# Patient Record
Sex: MALE | Race: WHITE | Employment: FULL TIME | URBAN - METROPOLITAN AREA
[De-identification: names, ages, dates, MRNs, and addresses within clinical notes are randomized per-mention and may not be internally consistent; named-entity substitution may affect disease eponyms.]

---

## 2018-06-11 ENCOUNTER — OFFICE VISIT (OUTPATIENT)
Dept: INTERNAL MEDICINE CLINIC | Age: 38
End: 2018-06-11

## 2018-06-11 VITALS
SYSTOLIC BLOOD PRESSURE: 124 MMHG | BODY MASS INDEX: 25.95 KG/M2 | WEIGHT: 152 LBS | DIASTOLIC BLOOD PRESSURE: 88 MMHG | HEART RATE: 72 BPM | HEIGHT: 64 IN

## 2018-06-11 DIAGNOSIS — M54.2 NECK PAIN: ICD-10-CM

## 2018-06-11 DIAGNOSIS — I65.02 STENOSIS OF LEFT VERTEBRAL ARTERY: Primary | ICD-10-CM

## 2018-06-11 DIAGNOSIS — I63.59 CEREBROVASCULAR ACCIDENT (CVA) DUE TO STENOSIS OF OTHER CEREBRAL ARTERY (HCC): ICD-10-CM

## 2018-06-11 PROCEDURE — 99203 OFFICE O/P NEW LOW 30 MIN: CPT | Performed by: INTERNAL MEDICINE

## 2018-06-11 ASSESSMENT — PATIENT HEALTH QUESTIONNAIRE - PHQ9
2. FEELING DOWN, DEPRESSED OR HOPELESS: 0
SUM OF ALL RESPONSES TO PHQ9 QUESTIONS 1 & 2: 0
SUM OF ALL RESPONSES TO PHQ QUESTIONS 1-9: 0
1. LITTLE INTEREST OR PLEASURE IN DOING THINGS: 0

## 2018-06-11 ASSESSMENT — ENCOUNTER SYMPTOMS
SHORTNESS OF BREATH: 0
ABDOMINAL PAIN: 0
NAUSEA: 0
PHOTOPHOBIA: 0
VOMITING: 0
WHEEZING: 0

## 2018-06-15 ENCOUNTER — OFFICE VISIT (OUTPATIENT)
Dept: VASCULAR SURGERY | Age: 38
End: 2018-06-15

## 2018-06-15 VITALS
SYSTOLIC BLOOD PRESSURE: 126 MMHG | DIASTOLIC BLOOD PRESSURE: 72 MMHG | BODY MASS INDEX: 25.95 KG/M2 | HEIGHT: 64 IN | WEIGHT: 152 LBS

## 2018-06-15 DIAGNOSIS — I65.02 STENOSIS OF LEFT VERTEBRAL ARTERY: Primary | ICD-10-CM

## 2018-06-15 PROCEDURE — 99203 OFFICE O/P NEW LOW 30 MIN: CPT | Performed by: SURGERY

## 2018-07-27 ENCOUNTER — HOSPITAL ENCOUNTER (OUTPATIENT)
Dept: OTHER | Age: 38
Discharge: HOME | End: 2018-07-27
Attending: PSYCHIATRY & NEUROLOGY | Admitting: PSYCHIATRY & NEUROLOGY

## 2018-07-27 ENCOUNTER — OFFICE VISIT (OUTPATIENT)
Dept: NEUROLOGY | Age: 38
End: 2018-07-27

## 2018-07-27 VITALS
WEIGHT: 155 LBS | SYSTOLIC BLOOD PRESSURE: 129 MMHG | HEART RATE: 75 BPM | HEIGHT: 65 IN | DIASTOLIC BLOOD PRESSURE: 89 MMHG | BODY MASS INDEX: 25.83 KG/M2

## 2018-07-27 DIAGNOSIS — I63.40 CEREBRAL INFARCTION DUE TO EMBOLISM OF CEREBRAL ARTERY (HCC): Primary | ICD-10-CM

## 2018-07-27 DIAGNOSIS — F17.200 SMOKER: ICD-10-CM

## 2018-07-27 DIAGNOSIS — I65.02 STENOSIS OF LEFT VERTEBRAL ARTERY: ICD-10-CM

## 2018-07-27 DIAGNOSIS — R90.89 ABNORMAL FINDING ON MRI OF BRAIN: ICD-10-CM

## 2018-07-27 DIAGNOSIS — I63.40 CEREBRAL INFARCTION DUE TO EMBOLISM OF CEREBRAL ARTERY (HCC): ICD-10-CM

## 2018-07-27 LAB — HOMOCYSTEINE: 9 UMOL/L (ref 0–10)

## 2018-07-27 PROCEDURE — 99245 OFF/OP CONSLTJ NEW/EST HI 55: CPT | Performed by: PSYCHIATRY & NEUROLOGY

## 2018-07-27 NOTE — PATIENT INSTRUCTIONS
Please call with any questions or concerns:   [facility]  @ [phone]. LAB RESULTS:  Please obtain any labs or diagnostic tests as discussed today. You should call the office to check the results. Please allow  3 to 7 days for us to get these results. MEDICATION LIST:  Please bring an accurate list of your medications to every visit. APPOINTMENT CONFIRMATION:  We will call you the day before your scheduled appointment to confirm. If we are unable to reach you, you MUST call back by the end of the day to confirm the appointment or we may be forced to cancel.

## 2018-07-27 NOTE — LETTER
He will also need a detailed hypercoagulopathy workup  Dissection/stenosis of the left vertebral artery but this would not explain all the strokes that the patient has had      RECOMMENDATIONS :  Discussed in detail with patient and his family  Strongly recommended that he quit smoking  Continue aspirin for the time being  I would recommend cardiology consultation, transesophageal echocardiogram and even consideration of a loop recorder to look for paroxysmal atrial fibrillation  I will do a detailed hypercoagulopathy workup  I will see him back in 2 months for follow-up  Thank you for this consultation        Please note a portion of this chart was generated using dragon dictation software. Although every effort was made to ensure the accuracy of this automated transcription, some errors in transcription may have occurred. If you have questions, please do not hesitate to call me. I look forward to following Michelle Conteh along with you. Sincerely,        Cece Davenport MD    CC providers:  Moraima Perez MD  [address] [facility]  VIA In Bessie Finn MD  [address] [place] [address].  [address] [place] [address]

## 2018-07-27 NOTE — PROGRESS NOTES
follow-up  Thank you for this consultation        Please note a portion of this chart was generated using dragon dictation software. Although every effort was made to ensure the accuracy of this automated transcription, some errors in transcription may have occurred.

## 2018-07-29 LAB
ANTICARDIOLIPIN IGG ANTIBODY: 1 GPL (ref 0–14)
CARDIOLIPIN AB IGM: 0 MPL (ref 0–12)
DRVVT CONFIRMATION TEST: NORMAL RATIO
DRVVT SCREEN: 43 SEC (ref 33–44)
DRVVT,DIL: NORMAL SEC (ref 33–44)
HEXAGONAL PHOSPHOLIPID NEUTRALIZAT TEST: NORMAL
LUPUS ANTICOAG INTERP: NORMAL
PLT NEUTA: NORMAL
PROTEIN C FUNCTIONAL: 145 % (ref 83–168)
PROTEIN S, FUNCTIONAL: 106 % (ref 66–143)
PT D: 13.2 SEC (ref 12–15.5)
PTT D: 41 SEC (ref 32–48)
PTT-D CORR REFLEX: NORMAL SEC (ref 32–48)
PTT-HEPARIN NEUTRALIZED: NORMAL SEC (ref 32–48)
REPTILASE TIME: NORMAL SEC
THROMBIN TIME: NORMAL SEC (ref 14.7–19.5)

## 2018-07-31 LAB
FACTOR V LEIDEN: NEGATIVE
SPECIMEN: NORMAL

## 2018-08-01 ENCOUNTER — OFFICE VISIT (OUTPATIENT)
Dept: INTERNAL MEDICINE CLINIC | Age: 38
End: 2018-08-01

## 2018-08-01 VITALS
HEART RATE: 72 BPM | SYSTOLIC BLOOD PRESSURE: 110 MMHG | DIASTOLIC BLOOD PRESSURE: 80 MMHG | HEIGHT: 65 IN | WEIGHT: 154 LBS | BODY MASS INDEX: 25.66 KG/M2

## 2018-08-01 DIAGNOSIS — I65.02 STENOSIS OF LEFT VERTEBRAL ARTERY: ICD-10-CM

## 2018-08-01 DIAGNOSIS — F17.219 CIGARETTE NICOTINE DEPENDENCE WITH NICOTINE-INDUCED DISORDER: ICD-10-CM

## 2018-08-01 DIAGNOSIS — M54.2 NECK PAIN: ICD-10-CM

## 2018-08-01 DIAGNOSIS — M50.90 CERVICAL DISC DISORDER: Primary | ICD-10-CM

## 2018-08-01 DIAGNOSIS — Z13.220 LIPID SCREENING: ICD-10-CM

## 2018-08-01 PROCEDURE — 99214 OFFICE O/P EST MOD 30 MIN: CPT | Performed by: INTERNAL MEDICINE

## 2018-08-01 RX ORDER — TIZANIDINE 2 MG/1
2 TABLET ORAL 3 TIMES DAILY
Qty: 90 TABLET | Refills: 0 | Status: SHIPPED | OUTPATIENT
Start: 2018-08-01 | End: 2018-08-28 | Stop reason: SDUPTHER

## 2018-08-01 ASSESSMENT — ENCOUNTER SYMPTOMS
PHOTOPHOBIA: 0
WHEEZING: 0
ABDOMINAL PAIN: 0
NAUSEA: 0
VOMITING: 0
SHORTNESS OF BREATH: 0

## 2018-08-01 NOTE — PROGRESS NOTES
Marie Torres   1980  male  40 y.o. SUBJECTIVE:    Chief Complaint   Patient presents with    Follow-up    Headache     more frequently--neck pain leads to headache.zanaflex hasn't help       HPI:  Follow-up visit. Patient continued to complain of pain at the left side of the neck and radiates to the head. He felt similar kind of pain following injury in 2016. In the past Zanaflex helps somewhat. In the past Percocet also helped. Patient have seen urologist, vascular surgeon. He Is going to see a cardiologist to exclude any  Potential cardiac thrombi in future. He'll continue to smoke cigarettes. He has significantly reduced the number of smoking. He will let me know once he is ready to quit his smoking    Past Medical History:   Diagnosis Date    Bell's palsy     History of migraine headaches     Ischemic stroke ([facility] [address]      No past surgical history on file. Social History     Social History    Marital status: Legally      Spouse name: N/A    Number of children: N/A    Years of education: N/A     Occupational History    [employer]     Social History Main Topics    Smoking status: Current Every Day Smoker     Packs/day: 0.50     Types: Cigarettes     Start date: 1/1/1997    Smokeless tobacco: Never Used      Comment: half a pack a day    Alcohol use No    Drug use: No    Sexual activity: Not Asked     Other Topics Concern    None     Social History Narrative    None     No family history on file. Review of Systems   Eyes: Negative for photophobia and visual disturbance. Respiratory: Negative for shortness of breath and wheezing. Cardiovascular: Negative for chest pain and palpitations. Gastrointestinal: Negative for abdominal pain, nausea and vomiting. Endocrine: Negative for polyphagia and polyuria. Musculoskeletal: Positive for neck pain. Neurological: Negative for dizziness, weakness and light-headedness.        OBJECTIVE:  Pulse Future  -     CBC; Future  -     BASIC METABOLIC PANEL; Future    Neck pain  -     tiZANidine (ZANAFLEX) 2 MG tablet; Take 1 tablet by mouth 3 times daily    Stenosis of left vertebral artery  Already seen vascular surgeon  Lipid screening-history ordered  -     LIPID PANEL; Future  -     HEPATIC FUNCTION PANEL; Future    Cigarette nicotine dependence with nicotine-induced disorder  Patient is not ready to quit his smoking at this time. An After Visit Summary was printed and given to the patient. Documentation was done using voice recognition dragon software. Every effort was made to ensure accuracy; however, inadvertent  Unintentional computerized transcription errors may be present. Orders Placed This Encounter   Procedures    MRI CERVICAL SPINE WO CONTRAST     Standing Status:   Future     Standing Expiration Date:   8/1/2019    LIPID PANEL     Standing Status:   Future     Standing Expiration Date:   8/1/2019     Order Specific Question:   Is Patient Fasting?/# of Hours     Answer:   non fasting    CBC     Standing Status:   Future     Standing Expiration Date:   8/1/2019    BASIC METABOLIC PANEL     Standing Status:   Future     Standing Expiration Date:   8/1/2019    HEPATIC FUNCTION PANEL     Standing Status:   Future     Standing Expiration Date:   8/1/2019     Current Outpatient Prescriptions   Medication Sig Dispense Refill    tiZANidine (ZANAFLEX) 2 MG tablet Take 1 tablet by mouth 3 times daily 90 tablet 0    aspirin 81 MG tablet Take 81 mg by mouth every other day Qod, due to nasal bleeding out of lt nostril only       No current facility-administered medications for this visit. Return in about 2 months (around 10/1/2018).

## 2018-08-07 ENCOUNTER — OFFICE VISIT (OUTPATIENT)
Dept: CARDIOLOGY CLINIC | Age: 38
End: 2018-08-07

## 2018-08-07 ENCOUNTER — HOSPITAL ENCOUNTER (OUTPATIENT)
Dept: MRI IMAGING | Age: 38
Discharge: HOME | End: 2018-08-07
Attending: INTERNAL MEDICINE | Admitting: INTERNAL MEDICINE

## 2018-08-07 ENCOUNTER — NURSE ONLY (OUTPATIENT)
Dept: CARDIOLOGY CLINIC | Age: 38
End: 2018-08-07

## 2018-08-07 VITALS
SYSTOLIC BLOOD PRESSURE: 112 MMHG | WEIGHT: 156.7 LBS | BODY MASS INDEX: 26.11 KG/M2 | HEART RATE: 94 BPM | HEIGHT: 65 IN | DIASTOLIC BLOOD PRESSURE: 76 MMHG | OXYGEN SATURATION: 97 %

## 2018-08-07 DIAGNOSIS — M50.90 CERVICAL DISC DISORDER: ICD-10-CM

## 2018-08-07 DIAGNOSIS — F17.200 SMOKER: ICD-10-CM

## 2018-08-07 DIAGNOSIS — I63.40 CEREBRAL INFARCTION DUE TO EMBOLISM OF CEREBRAL ARTERY (HCC): ICD-10-CM

## 2018-08-07 DIAGNOSIS — M48.00 NEURAL FORAMINAL STENOSIS, MULTILEVEL: Primary | ICD-10-CM

## 2018-08-07 DIAGNOSIS — I63.40 CEREBRAL INFARCTION DUE TO EMBOLISM OF CEREBRAL ARTERY (HCC): Primary | ICD-10-CM

## 2018-08-07 PROCEDURE — 93000 ELECTROCARDIOGRAM COMPLETE: CPT | Performed by: INTERNAL MEDICINE

## 2018-08-07 PROCEDURE — 99244 OFF/OP CNSLTJ NEW/EST MOD 40: CPT | Performed by: INTERNAL MEDICINE

## 2018-08-07 NOTE — COMMUNICATION BODY
[address]  Cardiac Consult     Referring Provider:  Jeffrey Ventura MD     Chief Complaint   Patient presents with    Other     cerebral infarct    Established New Doctor    Chest Pain     mild intermittent - a few days ago        History of Present Illness:  39 y/o male seen in consultation at the request of Dr. Hetaher Rankin and Noy Pinto for evaluation of cardiac source of emboli. 18 months ago while in [place], he began having difficulty with vision. No exacerbating or relieving factors. He has had associated neck pain and headaches. These symptoms have persisted. He underwent evaluation with MRI showing multiple scattered CVA's c/w embolic events. There was a question of vertebral dissection. He felt that he was not getting answers so came back to [place], where he is from. He was seen by Dr. Rolando Muhammad who did not feel that his CVA distribution was c/w a dissection. He was then seen by Dr. Noy Pinto who was concerned that this could be embolic. He did have an ECHO in [place] that was read as normal.    Past Medical History:   has a past medical history of Bell's palsy; History of migraine headaches; and Ischemic stroke ([facility] [address] Surgical History:   has no past surgical history on file. Social History:  Social History   Substance Use Topics    Smoking status: Current Every Day Smoker     Packs/day: 0.50     Types: Cigarettes     Start date: 1/1/1997    Smokeless tobacco: Never Used      Comment: half a pack a day    Alcohol use No        Family History:  family history includes Heart Disease in his father and mother; Stroke in his father and mother. Allergies:  Patient has no known allergies. Home Medications:  Prior to Visit Medications    Medication Sig Taking?  Authorizing Provider   tiZANidine (ZANAFLEX) 2 MG tablet Take 1 tablet by mouth 3 times daily  Patient taking differently: Take 2 mg by mouth 3 times daily as needed  Yes Jeffrey Ventura MD   aspirin 81 MG tablet Take 81 mg by mouth

## 2018-08-07 NOTE — PROGRESS NOTES
[address]  Cardiac Consult     Referring Provider:  Jovanni Amor MD     Chief Complaint   Patient presents with    Other     cerebral infarct    Established New Doctor    Chest Pain     mild intermittent - a few days ago        History of Present Illness:  39 y/o male seen in consultation at the request of Dr. Tobias Echeverria and Marvin Luis for evaluation of cardiac source of emboli. 18 months ago while in [place], he began having difficulty with vision. No exacerbating or relieving factors. He has had associated neck pain and headaches. These symptoms have persisted. He underwent evaluation with MRI showing multiple scattered CVA's c/w embolic events. There was a question of vertebral dissection. He felt that he was not getting answers so came back to [place], where he is from. He was seen by Dr. Davon Mcghee who did not feel that his CVA distribution was c/w a dissection. He was then seen by Dr. Marvin Luis who was concerned that this could be embolic. He did have an ECHO in [place] that was read as normal.    Past Medical History:   has a past medical history of Bell's palsy; History of migraine headaches; and Ischemic stroke ([facility] [address] Surgical History:   has no past surgical history on file. Social History:  Social History   Substance Use Topics    Smoking status: Current Every Day Smoker     Packs/day: 0.50     Types: Cigarettes     Start date: 1/1/1997    Smokeless tobacco: Never Used      Comment: half a pack a day    Alcohol use No        Family History:  family history includes Heart Disease in his father and mother; Stroke in his father and mother. Allergies:  Patient has no known allergies. Home Medications:  Prior to Visit Medications    Medication Sig Taking?  Authorizing Provider   tiZANidine (ZANAFLEX) 2 MG tablet Take 1 tablet by mouth 3 times daily  Patient taking differently: Take 2 mg by mouth 3 times daily as needed  Yes Jovanni Amor MD   aspirin 81 MG tablet Take 81 mg by mouth

## 2018-08-28 DIAGNOSIS — M54.2 NECK PAIN: ICD-10-CM

## 2018-08-28 RX ORDER — TIZANIDINE 2 MG/1
TABLET ORAL
Qty: 90 TABLET | Refills: 1 | Status: SHIPPED | OUTPATIENT
Start: 2018-08-28 | End: 2019-02-27 | Stop reason: ALTCHOICE

## 2018-09-18 PROCEDURE — 93228 REMOTE 30 DAY ECG REV/REPORT: CPT | Performed by: INTERNAL MEDICINE

## 2018-09-21 ENCOUNTER — TELEPHONE (OUTPATIENT)
Dept: CARDIOLOGY CLINIC | Age: 38
End: 2018-09-21

## 2018-09-30 ENCOUNTER — APPOINTMENT (OUTPATIENT)
Dept: GENERAL RADIOLOGY | Age: 38
End: 2018-09-30

## 2018-09-30 ENCOUNTER — APPOINTMENT (OUTPATIENT)
Dept: CT IMAGING | Age: 38
End: 2018-09-30

## 2018-09-30 ENCOUNTER — HOSPITAL ENCOUNTER (EMERGENCY)
Age: 38
Discharge: HOME | End: 2018-09-30
Attending: EMERGENCY MEDICINE

## 2018-09-30 VITALS
SYSTOLIC BLOOD PRESSURE: 118 MMHG | RESPIRATION RATE: 16 BRPM | WEIGHT: 152.25 LBS | TEMPERATURE: 98.2 F | DIASTOLIC BLOOD PRESSURE: 80 MMHG | OXYGEN SATURATION: 99 % | HEART RATE: 64 BPM | BODY MASS INDEX: 25.34 KG/M2

## 2018-09-30 DIAGNOSIS — M54.2 NECK PAIN: Primary | ICD-10-CM

## 2018-09-30 DIAGNOSIS — I65.02 VERTEBRAL ARTERY STENOSIS, LEFT: ICD-10-CM

## 2018-09-30 LAB
A/G RATIO: 1.5 (ref 1.1–2.2)
ALBUMIN SERPL-MCNC: 4.3 G/DL (ref 3.4–5)
ALP BLD-CCNC: 91 U/L (ref 40–129)
ALT SERPL-CCNC: 19 U/L (ref 10–40)
ANION GAP SERPL CALCULATED.3IONS-SCNC: 12 MMOL/L (ref 3–16)
AST SERPL-CCNC: 17 U/L (ref 15–37)
BASOPHILS ABSOLUTE: 0.1 K/UL (ref 0–0.2)
BASOPHILS RELATIVE PERCENT: 0.7 %
BILIRUB SERPL-MCNC: 0.3 MG/DL (ref 0–1)
BUN BLDV-MCNC: 12 MG/DL (ref 7–20)
CALCIUM SERPL-MCNC: 9.4 MG/DL (ref 8.3–10.6)
CHLORIDE BLD-SCNC: 104 MMOL/L (ref 99–110)
CO2: 25 MMOL/L (ref 21–32)
CREAT SERPL-MCNC: 1.1 MG/DL (ref 0.9–1.3)
EOSINOPHILS ABSOLUTE: 0.1 K/UL (ref 0–0.6)
EOSINOPHILS RELATIVE PERCENT: 0.8 %
GFR AFRICAN AMERICAN: >60
GFR NON-AFRICAN AMERICAN: >60
GLOBULIN: 2.8 G/DL
GLUCOSE BLD-MCNC: 110 MG/DL (ref 70–99)
HCT VFR BLD CALC: 40.5 % (ref 40.5–52.5)
HEMOGLOBIN: 14.3 G/DL (ref 13.5–17.5)
LYMPHOCYTES ABSOLUTE: 2.4 K/UL (ref 1–5.1)
LYMPHOCYTES RELATIVE PERCENT: 28.9 %
MCH RBC QN AUTO: 30.9 PG (ref 26–34)
MCHC RBC AUTO-ENTMCNC: 35.3 G/DL (ref 31–36)
MCV RBC AUTO: 87.6 FL (ref 80–100)
MONOCYTES ABSOLUTE: 0.6 K/UL (ref 0–1.3)
MONOCYTES RELATIVE PERCENT: 6.6 %
NEUTROPHILS ABSOLUTE: 5.3 K/UL (ref 1.7–7.7)
NEUTROPHILS RELATIVE PERCENT: 63 %
PDW BLD-RTO: 13.5 % (ref 12.4–15.4)
PLATELET # BLD: 259 K/UL (ref 135–450)
PMV BLD AUTO: 9.6 FL (ref 5–10.5)
POTASSIUM SERPL-SCNC: 4.2 MMOL/L (ref 3.5–5.1)
RBC # BLD: 4.63 M/UL (ref 4.2–5.9)
SODIUM BLD-SCNC: 141 MMOL/L (ref 136–145)
TOTAL PROTEIN: 7.1 G/DL (ref 6.4–8.2)
TROPONIN: <0.01 NG/ML
WBC # BLD: 8.4 K/UL (ref 4–11)

## 2018-09-30 PROCEDURE — 72050 X-RAY EXAM NECK SPINE 4/5VWS: CPT

## 2018-09-30 PROCEDURE — 85025 COMPLETE CBC W/AUTO DIFF WBC: CPT

## 2018-09-30 PROCEDURE — 70496 CT ANGIOGRAPHY HEAD: CPT

## 2018-09-30 PROCEDURE — 84484 ASSAY OF TROPONIN QUANT: CPT

## 2018-09-30 PROCEDURE — 99284 EMERGENCY DEPT VISIT MOD MDM: CPT

## 2018-09-30 PROCEDURE — 72040 X-RAY EXAM NECK SPINE 2-3 VW: CPT

## 2018-09-30 PROCEDURE — 70450 CT HEAD/BRAIN W/O DYE: CPT

## 2018-09-30 PROCEDURE — 93005 ELECTROCARDIOGRAM TRACING: CPT | Performed by: PHYSICIAN ASSISTANT

## 2018-09-30 PROCEDURE — 80053 COMPREHEN METABOLIC PANEL: CPT

## 2018-09-30 PROCEDURE — 71046 X-RAY EXAM CHEST 2 VIEWS: CPT

## 2018-09-30 PROCEDURE — 70498 CT ANGIOGRAPHY NECK: CPT

## 2018-09-30 PROCEDURE — 6360000004 HC RX CONTRAST MEDICATION: Performed by: PHYSICIAN ASSISTANT

## 2018-09-30 RX ORDER — TRAMADOL HYDROCHLORIDE 50 MG/1
50 TABLET ORAL EVERY 6 HOURS PRN
Qty: 20 TABLET | Refills: 0 | Status: SHIPPED | OUTPATIENT
Start: 2018-09-30 | End: 2018-10-10

## 2018-09-30 RX ORDER — PREDNISONE 10 MG/1
60 TABLET ORAL DAILY
Qty: 30 TABLET | Refills: 0 | Status: SHIPPED | OUTPATIENT
Start: 2018-09-30 | End: 2018-10-05

## 2018-09-30 RX ADMIN — IOPAMIDOL 100 ML: 755 INJECTION, SOLUTION INTRAVENOUS at 15:56

## 2018-09-30 ASSESSMENT — PAIN DESCRIPTION - PAIN TYPE: TYPE: ACUTE PAIN

## 2018-09-30 ASSESSMENT — PAIN DESCRIPTION - LOCATION: LOCATION: NECK

## 2018-09-30 ASSESSMENT — PAIN SCALES - GENERAL: PAINLEVEL_OUTOF10: 5

## 2018-09-30 NOTE — ED PROVIDER NOTES
[address] [facility]  eMERGENCY dEPARTMENT eNCOUnter        Pt Name: Reed Noriega  MRN: [id number]  [place] 1980  Date of evaluation: 9/30/2018  Provider: ELENITA eMdina  PCP: Nellie Marrero MD  ED Attending: No att. providers found    [address] [place] [address]       Chief Complaint   Patient presents with    Neck Pain     pt reports left sided neck pain-worsens with movement, epistaxis from his left nostril. patient reports chest pain last night-chronic issue-none now. pt reports vision loss to left eye last night. current only complaint is the neck pain with some general fatigue       HISTORY OF PRESENT ILLNESS   (Location/Symptom, Timing/Onset, Context/Setting, Quality, Duration, Modifying Factors, Severity)  Note limiting factors. Reed Noriega is a 40 y.o. male with known history of vascular concerns including left vertebral artery stenosis as well as neuroforaminal impingement and stenosis involving his cervical spine now presents for new concerns following 12 hour history of what began his left-sided epistaxis which resolved spontaneously while at work yesterday. Patient states that thereafter he began to develop left-sided neck pain and had some blurred vision in his left eye. Patient states that when he woke this morning he had continuation of this pain and this concerned him and prompted his presentation emergency department today. Patient also notes ongoing sequelae of his vascular disease having been diagnosed with multiple TIAs in the outpatient setting. Patient recently completed cardiac evaluation by his cardiologist Dr. Larry Mosqueda and had completed the wearing of the event monitor which subjectively demonstrated no concerns for any acute dysrhythmias or other more impending cardiac concerns.   He also has a vascular specialist Dr. Jaya Barone whom he follows with regularly given his known history of vertebral artery stenosis as described above  Patient

## 2018-09-30 NOTE — ED PROVIDER NOTES
As provider-in-triage, I performed a brief history and physical exam to establish acuity, and ordered appropriate tests to develop a basic plan of care. Patient will have a more thorough history and physical exam and will be documented by the treating RYLIE and/or physician who will evaluate the patient. PROVIDER IN [address]  eMERGENCY dEPARTMENT eNCOUnter   Pt Name: Agapito Law  MRN: [id number]  [place] 1980  Date of evaluation: 9/30/2018  PCP: Ansley Ryan MD    Chief Complaint   Patient presents with    Neck Pain     pt reports left sided neck pain-worsens with movement, epistaxis from his left nostril. patient reports chest pain last night-chronic issue-none now. pt reports vision loss to left eye last night. current only complaint is the neck pain with some general fatigue       HISTORY OF PRESENT ILLNESS  Kathy Crum II is a 40 y.o. male who presents to the emergency department with complaints of Mostly neck pain. Recent MRI that revealed neural foraminal stenosis also complaining of intermittent loss of vision out of his left eye, chest pain, epistaxis. He states right now he only has neck pain. Patient does have significant medical history that is followed up with cardiology and vascular surgery with history of vertebral artery stenosis, CVA     PHYSICAL EXAM  /86   Pulse 82   Temp 98.2 °F (36.8 °C)   Resp 16   Wt 152 lb 4 oz (69.1 kg)   SpO2 98%   BMI 25.34 kg/m²     On brief exam with patient sitting up, the patient appears well-hydrated, well-nourished, and in no acute distress. Mucous membranes are moist.    Skin is dry. Mental status is normal. Moves all extremities, and is without facial droop. Speech is clear. Heart is regular rate and rhythm. Breathing is unlabored. Lungs clear to auscultation bilaterally.     Plan:    Medications - No data to display    LABS:   Labs Reviewed   CBC WITH AUTO DIFFERENTIAL

## 2018-09-30 NOTE — ED NOTES
Pt alert and oriented, Pt to ER with left sided neck pain that worsens with movement, CP, and a nose bleed from his left nostril. PT denies CP at this time. heart rate regular, S1, S2 heard. Cap refill less than three seconds, radial pulse 2+, no signs of edema or JVD and lungs sounds clear. Pt rates pain 5/10 at this time. Denies injury. Pt denies any other problems or needs at this time.      Rosita Huerta RN  09/30/18 [id number]

## 2018-10-01 ENCOUNTER — APPOINTMENT (OUTPATIENT)
Dept: GENERAL RADIOLOGY | Age: 38
End: 2018-10-01

## 2018-10-01 ENCOUNTER — HOSPITAL ENCOUNTER (EMERGENCY)
Age: 38
Discharge: HOME | End: 2018-10-01
Attending: EMERGENCY MEDICINE

## 2018-10-01 VITALS
OXYGEN SATURATION: 97 % | HEIGHT: 65 IN | BODY MASS INDEX: 24.99 KG/M2 | RESPIRATION RATE: 17 BRPM | HEART RATE: 74 BPM | DIASTOLIC BLOOD PRESSURE: 91 MMHG | TEMPERATURE: 98.4 F | WEIGHT: 150 LBS | SYSTOLIC BLOOD PRESSURE: 111 MMHG

## 2018-10-01 DIAGNOSIS — R07.9 CHEST PAIN, UNSPECIFIED TYPE: Primary | ICD-10-CM

## 2018-10-01 DIAGNOSIS — M54.2 NECK PAIN: ICD-10-CM

## 2018-10-01 DIAGNOSIS — I65.02 STENOSIS OF LEFT VERTEBRAL ARTERY: ICD-10-CM

## 2018-10-01 DIAGNOSIS — R11.0 NAUSEA: ICD-10-CM

## 2018-10-01 LAB
A/G RATIO: 1.3 (ref 1.1–2.2)
ALBUMIN SERPL-MCNC: 4.5 G/DL (ref 3.4–5)
ALP BLD-CCNC: 97 U/L (ref 40–129)
ALT SERPL-CCNC: 18 U/L (ref 10–40)
ANION GAP SERPL CALCULATED.3IONS-SCNC: 11 MMOL/L (ref 3–16)
APTT: 34.6 SEC (ref 26–36)
AST SERPL-CCNC: 17 U/L (ref 15–37)
BASOPHILS ABSOLUTE: 0.1 K/UL (ref 0–0.2)
BASOPHILS RELATIVE PERCENT: 0.6 %
BILIRUB SERPL-MCNC: 0.3 MG/DL (ref 0–1)
BUN BLDV-MCNC: 12 MG/DL (ref 7–20)
CALCIUM SERPL-MCNC: 9.8 MG/DL (ref 8.3–10.6)
CHLORIDE BLD-SCNC: 101 MMOL/L (ref 99–110)
CO2: 27 MMOL/L (ref 21–32)
CREAT SERPL-MCNC: 1 MG/DL (ref 0.9–1.3)
EOSINOPHILS ABSOLUTE: 0 K/UL (ref 0–0.6)
EOSINOPHILS RELATIVE PERCENT: 0.1 %
GFR AFRICAN AMERICAN: >60
GFR NON-AFRICAN AMERICAN: >60
GLOBULIN: 3.5 G/DL
GLUCOSE BLD-MCNC: 128 MG/DL (ref 70–99)
HCT VFR BLD CALC: 43 % (ref 40.5–52.5)
HEMOGLOBIN: 15 G/DL (ref 13.5–17.5)
INR BLD: 1 (ref 0.86–1.14)
LYMPHOCYTES ABSOLUTE: 1.4 K/UL (ref 1–5.1)
LYMPHOCYTES RELATIVE PERCENT: 13.4 %
MCH RBC QN AUTO: 30.3 PG (ref 26–34)
MCHC RBC AUTO-ENTMCNC: 34.9 G/DL (ref 31–36)
MCV RBC AUTO: 86.8 FL (ref 80–100)
MONOCYTES ABSOLUTE: 0.2 K/UL (ref 0–1.3)
MONOCYTES RELATIVE PERCENT: 2.1 %
NEUTROPHILS ABSOLUTE: 8.6 K/UL (ref 1.7–7.7)
NEUTROPHILS RELATIVE PERCENT: 83.8 %
PDW BLD-RTO: 13.6 % (ref 12.4–15.4)
PLATELET # BLD: 267 K/UL (ref 135–450)
PMV BLD AUTO: 9.2 FL (ref 5–10.5)
POTASSIUM REFLEX MAGNESIUM: 4.5 MMOL/L (ref 3.5–5.1)
PRO-BNP: 63 PG/ML (ref 0–124)
PROTHROMBIN TIME: 11.4 SEC (ref 9.8–13)
RBC # BLD: 4.95 M/UL (ref 4.2–5.9)
SODIUM BLD-SCNC: 139 MMOL/L (ref 136–145)
TOTAL PROTEIN: 8 G/DL (ref 6.4–8.2)
TROPONIN: <0.01 NG/ML
WBC # BLD: 10.2 K/UL (ref 4–11)

## 2018-10-01 PROCEDURE — 85025 COMPLETE CBC W/AUTO DIFF WBC: CPT

## 2018-10-01 PROCEDURE — 93005 ELECTROCARDIOGRAM TRACING: CPT | Performed by: PHYSICIAN ASSISTANT

## 2018-10-01 PROCEDURE — 6370000000 HC RX 637 (ALT 250 FOR IP): Performed by: PHYSICIAN ASSISTANT

## 2018-10-01 PROCEDURE — 93010 ELECTROCARDIOGRAM REPORT: CPT | Performed by: INTERNAL MEDICINE

## 2018-10-01 PROCEDURE — 71046 X-RAY EXAM CHEST 2 VIEWS: CPT

## 2018-10-01 PROCEDURE — 84484 ASSAY OF TROPONIN QUANT: CPT

## 2018-10-01 PROCEDURE — 80053 COMPREHEN METABOLIC PANEL: CPT

## 2018-10-01 PROCEDURE — 85610 PROTHROMBIN TIME: CPT

## 2018-10-01 PROCEDURE — 83880 ASSAY OF NATRIURETIC PEPTIDE: CPT

## 2018-10-01 PROCEDURE — 96374 THER/PROPH/DIAG INJ IV PUSH: CPT

## 2018-10-01 PROCEDURE — 99285 EMERGENCY DEPT VISIT HI MDM: CPT

## 2018-10-01 PROCEDURE — 6360000002 HC RX W HCPCS: Performed by: PHYSICIAN ASSISTANT

## 2018-10-01 PROCEDURE — 85730 THROMBOPLASTIN TIME PARTIAL: CPT

## 2018-10-01 RX ORDER — TRAMADOL HYDROCHLORIDE 50 MG/1
50 TABLET ORAL ONCE
Status: COMPLETED | OUTPATIENT
Start: 2018-10-01 | End: 2018-10-01

## 2018-10-01 RX ORDER — ONDANSETRON 4 MG/1
4 TABLET, FILM COATED ORAL EVERY 8 HOURS PRN
Qty: 20 TABLET | Refills: 0 | Status: ON HOLD | OUTPATIENT
Start: 2018-10-01 | End: 2019-03-01 | Stop reason: HOSPADM

## 2018-10-01 RX ORDER — ONDANSETRON 2 MG/ML
4 INJECTION INTRAMUSCULAR; INTRAVENOUS ONCE
Status: COMPLETED | OUTPATIENT
Start: 2018-10-01 | End: 2018-10-01

## 2018-10-01 RX ADMIN — ONDANSETRON 4 MG: 2 INJECTION INTRAMUSCULAR; INTRAVENOUS at 20:10

## 2018-10-01 RX ADMIN — TRAMADOL HYDROCHLORIDE 50 MG: 50 TABLET, FILM COATED ORAL at 20:11

## 2018-10-01 ASSESSMENT — PAIN SCALES - GENERAL
PAINLEVEL_OUTOF10: 8
PAINLEVEL_OUTOF10: 4

## 2018-10-01 ASSESSMENT — PAIN DESCRIPTION - ORIENTATION: ORIENTATION: MID

## 2018-10-01 ASSESSMENT — PAIN DESCRIPTION - PAIN TYPE: TYPE: ACUTE PAIN

## 2018-10-02 PROCEDURE — 93010 ELECTROCARDIOGRAM REPORT: CPT | Performed by: INTERNAL MEDICINE

## 2018-10-02 NOTE — ED PROVIDER NOTES
CHIEF COMPLAINT  Chest Pain (chest tightness, vision loss, and neck pain x2 days. was seen here yesterday for same thing)      HISTORY OF PRESENT ILLNESS  Janey Oneal II is a 40 y.o. male presents to the ED with presents emergency Department chief complaint of chest tightness and symptoms started his prior to come here for evaluation patient states that he was at work when he started having a 8 out of 10 chest pain pain in the right side of his neck. He had some blurred vision of the right eye. It similar symptoms of the left side yesterday. He denies any trauma denies any injury. States that he been taking home medications as directed  He denies any  current blurred vision states that the pain is completely alleviated  Denies chest pain denies shortness of breath no lower extremity edema. He states he has a history of strokes and is followed with the vascular surgery in addition to neurology and internal medicine Dr. Tong Waters.  .  No other complaints, modifying factors or associated symptoms. I have reviewed the following from the nursing documentation. Past Medical History:   Diagnosis Date    Bell's palsy     History of migraine headaches     Ischemic stroke ([facility] [address]      History reviewed. No pertinent surgical history.   Family History   Problem Relation Age of Onset    Heart Disease Mother     Stroke Mother     Heart Disease Father     Stroke Father      Social History     Social History    Marital status: Legally      Spouse name: N/A    Number of children: N/A    Years of education: N/A     Occupational History    [employer]     Social History Main Topics    Smoking status: Current Every Day Smoker     Packs/day: 0.50     Types: Cigarettes     Start date: 1/1/1997    Smokeless tobacco: Never Used      Comment: half a pack a day    Alcohol use No    Drug use: No    Sexual activity: Not on file     Other Topics Concern    Not on file     Social History Narrative    No narrative on file     No current facility-administered medications for this encounter. Current Outpatient Prescriptions   Medication Sig Dispense Refill    ondansetron (ZOFRAN) 4 MG tablet Take 1 tablet by mouth every 8 hours as needed for Nausea 20 tablet 0    traMADol (ULTRAM) 50 MG tablet Take 1 tablet by mouth every 6 hours as needed for Pain for up to 10 days. . 20 tablet 0    predniSONE (DELTASONE) 10 MG tablet Take 6 tablets by mouth daily for 5 doses 30 tablet 0    tiZANidine (ZANAFLEX) 2 MG tablet TAKE 1 TABLET BY MOUTH 3 TIMES A DAY 90 tablet 1    aspirin 81 MG tablet Take 81 mg by mouth every other day Qod, due to nasal bleeding out of lt nostril only       No Known Allergies    REVIEW OF SYSTEMS  ROS  10 systems reviewed, pertinent positives per HPI otherwise noted to be negative. PHYSICAL EXAM  BP (!) 111/91   Pulse 74   Temp 98.4 °F (36.9 °C) (Infrared)   Resp 17   Ht 5' 5\" (1.651 m)   Wt 150 lb (68 kg)   SpO2 97%   BMI 24.96 kg/m²     GENERAL APPEARANCE: no acute distress, nontoxic and non-ill-appearing, fully developed well-nourished adult. HEAD: Normocephalic. Atraumatic. EYES: EOM's grossly intact. ENT: Mucous membranes are moist.   NECK: Supple. Full Range of motion, tenderness of palpation the paraspinal musculature in the cervical spine otherwise there is no point tenderness is no contusions or rashes    HEART: RRR. No murmurs  LUNGS:  Lungs are cta. ABDOMEN: Soft. Non-distended. Normal bowel sounds. No organomegaly. Non-tender  EXTREMITIES: No peripheral edema. Moves all extremities equally. No gross deformities of the upper or lower extremities. SKIN: Warm and dry. No acute rashes. NEUROLOGICAL: Alert and oriented. Cranial nerves II-12 are grossly intact, no focal neurologic deficits . PSYCHIATRIC: Normal mood and affect. Physical Exam    LABS  I have reviewed all labs for this visit.    Results for orders placed or performed during the for Exam: CHEST PAIN, NAUSEA, BLURRED VISION AND DIZZINESS. PATIENT WAS SEEN HER YESTERDAY FOR THE SAME SYMPTOMS. SMOKER. HISTORY OF BELL'S PALSY, MIGRAINES AND ISCHEMIC STROKES. Acuity: Acute Type of Exam: Initial FINDINGS: The lungs are without acute focal process. There is no effusion or pneumothorax. The cardiomediastinal silhouette is stable. The osseous structures are stable. No acute process. Xr Chest Standard (2 Vw)    Result Date: 10/1/2018  EXAMINATION: TWO VIEWS OF THE CHEST 9/30/2018 2:36 pm COMPARISON: None. HISTORY: ORDERING SYSTEM PROVIDED HISTORY: Chest Discomfort TECHNOLOGIST PROVIDED HISTORY: Reason for exam:->Chest Discomfort Ordering Physician Provided Reason for Exam: Neck Pain (pt reports left sided neck pain-worsens with movement, epistaxis from his left nostril. patient reports chest pain last night-chronic issue-none now. pt reports vision loss to left eye last night. current only complaint is the neck pain with some general fatigue) Acuity: Unknown Type of Exam: Unknown FINDINGS: Frontal and lateral views of the chest demonstrate no acute osseous abnormality. The heart size and mediastinal contours are within normal limits. The lungs are clear, without evidence of acute airspace consolidation, pneumothorax, or pleural effusion. No acute cardiopulmonary disease. Xr Cervical Spine (2-3 Views)    Result Date: 10/1/2018  EXAMINATION: THREE XRAY VIEWS OF THE CERVICAL SPINE 9/30/2018 2:36 pm COMPARISON: Cervical MRI 08/07/2018 HISTORY: ORDERING SYSTEM PROVIDED HISTORY: neck pain TECHNOLOGIST PROVIDED HISTORY: Reason for exam:->neck pain Ordering Physician Provided Reason for Exam: Neck Pain (pt reports left sided neck pain-worsens with movement, epistaxis from his left nostril. patient reports chest pain last night-chronic issue-none now. pt reports vision loss to left eye last night.  current only complaint is the neck pain with some general fatigue) Acuity: Unknown Type of

## 2018-10-04 LAB
EKG ATRIAL RATE: 70 BPM
EKG DIAGNOSIS: NORMAL
EKG P AXIS: 55 DEGREES
EKG P-R INTERVAL: 122 MS
EKG Q-T INTERVAL: 370 MS
EKG QRS DURATION: 82 MS
EKG QTC CALCULATION (BAZETT): 399 MS
EKG R AXIS: 48 DEGREES
EKG T AXIS: 55 DEGREES
EKG VENTRICULAR RATE: 70 BPM

## 2018-10-05 LAB
EKG ATRIAL RATE: 61 BPM
EKG DIAGNOSIS: NORMAL
EKG P AXIS: 43 DEGREES
EKG P-R INTERVAL: 114 MS
EKG Q-T INTERVAL: 382 MS
EKG QRS DURATION: 88 MS
EKG QTC CALCULATION (BAZETT): 384 MS
EKG R AXIS: 35 DEGREES
EKG T AXIS: 45 DEGREES
EKG VENTRICULAR RATE: 61 BPM

## 2019-01-17 ENCOUNTER — OFFICE VISIT (OUTPATIENT)
Dept: ORTHOPEDIC SURGERY | Age: 39
End: 2019-01-17
Payer: COMMERCIAL

## 2019-01-17 VITALS
WEIGHT: 156.2 LBS | RESPIRATION RATE: 17 BRPM | HEART RATE: 84 BPM | SYSTOLIC BLOOD PRESSURE: 129 MMHG | BODY MASS INDEX: 26.02 KG/M2 | HEIGHT: 65 IN | DIASTOLIC BLOOD PRESSURE: 87 MMHG

## 2019-01-17 DIAGNOSIS — S43.432A TEAR OF LEFT GLENOID LABRUM, INITIAL ENCOUNTER: ICD-10-CM

## 2019-01-17 DIAGNOSIS — S46.012A STRAIN OF TENDON OF LEFT ROTATOR CUFF, INITIAL ENCOUNTER: ICD-10-CM

## 2019-01-17 DIAGNOSIS — M25.512 ACUTE PAIN OF LEFT SHOULDER: Primary | ICD-10-CM

## 2019-01-17 PROCEDURE — 99203 OFFICE O/P NEW LOW 30 MIN: CPT | Performed by: ORTHOPAEDIC SURGERY

## 2019-02-08 ENCOUNTER — TELEPHONE (OUTPATIENT)
Dept: ORTHOPEDIC SURGERY | Age: 39
End: 2019-02-08

## 2019-02-28 ENCOUNTER — ANESTHESIA EVENT (OUTPATIENT)
Dept: OPERATING ROOM | Age: 39
End: 2019-02-28
Payer: COMMERCIAL

## 2019-03-01 ENCOUNTER — HOSPITAL ENCOUNTER (OUTPATIENT)
Age: 39
Setting detail: OUTPATIENT SURGERY
Discharge: HOME | End: 2019-03-01
Attending: ORTHOPAEDIC SURGERY | Admitting: ORTHOPAEDIC SURGERY
Payer: COMMERCIAL

## 2019-03-01 ENCOUNTER — ANESTHESIA (OUTPATIENT)
Dept: OPERATING ROOM | Age: 39
End: 2019-03-01
Payer: COMMERCIAL

## 2019-03-01 VITALS
HEIGHT: 65 IN | DIASTOLIC BLOOD PRESSURE: 90 MMHG | WEIGHT: 159.94 LBS | OXYGEN SATURATION: 94 % | HEART RATE: 94 BPM | TEMPERATURE: 97 F | RESPIRATION RATE: 16 BRPM | SYSTOLIC BLOOD PRESSURE: 123 MMHG | BODY MASS INDEX: 26.65 KG/M2

## 2019-03-01 VITALS
RESPIRATION RATE: 23 BRPM | DIASTOLIC BLOOD PRESSURE: 58 MMHG | OXYGEN SATURATION: 100 % | SYSTOLIC BLOOD PRESSURE: 101 MMHG

## 2019-03-01 DIAGNOSIS — S43.432A TEAR OF LEFT GLENOID LABRUM, INITIAL ENCOUNTER: Primary | ICD-10-CM

## 2019-03-01 PROCEDURE — 2500000003 HC RX 250 WO HCPCS: Performed by: NURSE ANESTHETIST, CERTIFIED REGISTERED

## 2019-03-01 PROCEDURE — 2709999900 HC NON-CHARGEABLE SUPPLY: Performed by: ORTHOPAEDIC SURGERY

## 2019-03-01 PROCEDURE — 7100000001 HC PACU RECOVERY - ADDTL 15 MIN: Performed by: ORTHOPAEDIC SURGERY

## 2019-03-01 PROCEDURE — L3650 SO 8 ABD RESTRAINT PRE OTS: HCPCS | Performed by: ORTHOPAEDIC SURGERY

## 2019-03-01 PROCEDURE — 3700000000 HC ANESTHESIA ATTENDED CARE: Performed by: ORTHOPAEDIC SURGERY

## 2019-03-01 PROCEDURE — 2580000003 HC RX 258: Performed by: ORTHOPAEDIC SURGERY

## 2019-03-01 PROCEDURE — 6360000002 HC RX W HCPCS: Performed by: NURSE ANESTHETIST, CERTIFIED REGISTERED

## 2019-03-01 PROCEDURE — 7100000011 HC PHASE II RECOVERY - ADDTL 15 MIN: Performed by: ORTHOPAEDIC SURGERY

## 2019-03-01 PROCEDURE — 7100000010 HC PHASE II RECOVERY - FIRST 15 MIN: Performed by: ORTHOPAEDIC SURGERY

## 2019-03-01 PROCEDURE — 29807 SHO ARTHRS SRG RPR SLAP LES: CPT | Performed by: ORTHOPAEDIC SURGERY

## 2019-03-01 PROCEDURE — 3700000001 HC ADD 15 MINUTES (ANESTHESIA): Performed by: ORTHOPAEDIC SURGERY

## 2019-03-01 PROCEDURE — C1713 ANCHOR/SCREW BN/BN,TIS/BN: HCPCS | Performed by: ORTHOPAEDIC SURGERY

## 2019-03-01 PROCEDURE — 2720000010 HC SURG SUPPLY STERILE: Performed by: ORTHOPAEDIC SURGERY

## 2019-03-01 PROCEDURE — 2580000003 HC RX 258: Performed by: ANESTHESIOLOGY

## 2019-03-01 PROCEDURE — 6360000002 HC RX W HCPCS

## 2019-03-01 PROCEDURE — 7100000000 HC PACU RECOVERY - FIRST 15 MIN: Performed by: ORTHOPAEDIC SURGERY

## 2019-03-01 PROCEDURE — 3600000014 HC SURGERY LEVEL 4 ADDTL 15MIN: Performed by: ORTHOPAEDIC SURGERY

## 2019-03-01 PROCEDURE — 3600000004 HC SURGERY LEVEL 4 BASE: Performed by: ORTHOPAEDIC SURGERY

## 2019-03-01 DEVICE — ANCHOR SUT L15.5MM DIA2.9MM BIOCOMPOSITE PUSHLOCK: Type: IMPLANTABLE DEVICE | Site: SHOULDER | Status: FUNCTIONAL

## 2019-03-01 RX ORDER — METOPROLOL TARTRATE 5 MG/5ML
INJECTION INTRAVENOUS PRN
Status: DISCONTINUED | OUTPATIENT
Start: 2019-03-01 | End: 2019-03-01 | Stop reason: SDUPTHER

## 2019-03-01 RX ORDER — SODIUM CHLORIDE 9 MG/ML
INJECTION, SOLUTION INTRAVENOUS CONTINUOUS
Status: DISCONTINUED | OUTPATIENT
Start: 2019-03-01 | End: 2019-03-01 | Stop reason: HOSPADM

## 2019-03-01 RX ORDER — MIDAZOLAM HYDROCHLORIDE 1 MG/ML
INJECTION INTRAMUSCULAR; INTRAVENOUS PRN
Status: DISCONTINUED | OUTPATIENT
Start: 2019-03-01 | End: 2019-03-01 | Stop reason: SDUPTHER

## 2019-03-01 RX ORDER — FENTANYL CITRATE 50 UG/ML
INJECTION, SOLUTION INTRAMUSCULAR; INTRAVENOUS PRN
Status: DISCONTINUED | OUTPATIENT
Start: 2019-03-01 | End: 2019-03-01 | Stop reason: SDUPTHER

## 2019-03-01 RX ORDER — OXYCODONE HYDROCHLORIDE AND ACETAMINOPHEN 5; 325 MG/1; MG/1
1 TABLET ORAL EVERY 4 HOURS PRN
Qty: 40 TABLET | Refills: 0 | Status: SHIPPED | OUTPATIENT
Start: 2019-03-01 | End: 2019-03-08

## 2019-03-01 RX ORDER — MAGNESIUM HYDROXIDE 1200 MG/15ML
LIQUID ORAL CONTINUOUS PRN
Status: COMPLETED | OUTPATIENT
Start: 2019-03-01 | End: 2019-03-01

## 2019-03-01 RX ORDER — ONDANSETRON 2 MG/ML
4 INJECTION INTRAMUSCULAR; INTRAVENOUS
Status: DISCONTINUED | OUTPATIENT
Start: 2019-03-01 | End: 2019-03-01 | Stop reason: HOSPADM

## 2019-03-01 RX ORDER — SODIUM CHLORIDE 0.9 % (FLUSH) 0.9 %
10 SYRINGE (ML) INJECTION PRN
Status: DISCONTINUED | OUTPATIENT
Start: 2019-03-01 | End: 2019-03-01 | Stop reason: HOSPADM

## 2019-03-01 RX ORDER — FENTANYL CITRATE 50 UG/ML
25 INJECTION, SOLUTION INTRAMUSCULAR; INTRAVENOUS EVERY 5 MIN PRN
Status: DISCONTINUED | OUTPATIENT
Start: 2019-03-01 | End: 2019-03-01 | Stop reason: HOSPADM

## 2019-03-01 RX ORDER — LIDOCAINE HYDROCHLORIDE 20 MG/ML
INJECTION, SOLUTION INFILTRATION; PERINEURAL PRN
Status: DISCONTINUED | OUTPATIENT
Start: 2019-03-01 | End: 2019-03-01 | Stop reason: SDUPTHER

## 2019-03-01 RX ORDER — MORPHINE SULFATE 2 MG/ML
2 INJECTION, SOLUTION INTRAMUSCULAR; INTRAVENOUS EVERY 5 MIN PRN
Status: DISCONTINUED | OUTPATIENT
Start: 2019-03-01 | End: 2019-03-01 | Stop reason: HOSPADM

## 2019-03-01 RX ORDER — DEXAMETHASONE SODIUM PHOSPHATE 4 MG/ML
INJECTION, SOLUTION INTRA-ARTICULAR; INTRALESIONAL; INTRAMUSCULAR; INTRAVENOUS; SOFT TISSUE PRN
Status: DISCONTINUED | OUTPATIENT
Start: 2019-03-01 | End: 2019-03-01 | Stop reason: SDUPTHER

## 2019-03-01 RX ORDER — MORPHINE SULFATE 2 MG/ML
1 INJECTION, SOLUTION INTRAMUSCULAR; INTRAVENOUS EVERY 5 MIN PRN
Status: DISCONTINUED | OUTPATIENT
Start: 2019-03-01 | End: 2019-03-01 | Stop reason: HOSPADM

## 2019-03-01 RX ORDER — GLYCOPYRROLATE 0.2 MG/ML
INJECTION INTRAMUSCULAR; INTRAVENOUS PRN
Status: DISCONTINUED | OUTPATIENT
Start: 2019-03-01 | End: 2019-03-01 | Stop reason: SDUPTHER

## 2019-03-01 RX ORDER — MEPERIDINE HYDROCHLORIDE 25 MG/ML
12.5 INJECTION INTRAMUSCULAR; INTRAVENOUS; SUBCUTANEOUS EVERY 5 MIN PRN
Status: DISCONTINUED | OUTPATIENT
Start: 2019-03-01 | End: 2019-03-01 | Stop reason: HOSPADM

## 2019-03-01 RX ORDER — SODIUM CHLORIDE 0.9 % (FLUSH) 0.9 %
10 SYRINGE (ML) INJECTION EVERY 12 HOURS SCHEDULED
Status: DISCONTINUED | OUTPATIENT
Start: 2019-03-01 | End: 2019-03-01 | Stop reason: HOSPADM

## 2019-03-01 RX ORDER — ONDANSETRON 2 MG/ML
INJECTION INTRAMUSCULAR; INTRAVENOUS PRN
Status: DISCONTINUED | OUTPATIENT
Start: 2019-03-01 | End: 2019-03-01 | Stop reason: SDUPTHER

## 2019-03-01 RX ORDER — SUCCINYLCHOLINE CHLORIDE 20 MG/ML
INJECTION INTRAMUSCULAR; INTRAVENOUS PRN
Status: DISCONTINUED | OUTPATIENT
Start: 2019-03-01 | End: 2019-03-01 | Stop reason: SDUPTHER

## 2019-03-01 RX ORDER — OXYCODONE HYDROCHLORIDE 5 MG/1
10 TABLET ORAL PRN
Status: DISCONTINUED | OUTPATIENT
Start: 2019-03-01 | End: 2019-03-01 | Stop reason: HOSPADM

## 2019-03-01 RX ORDER — PROMETHAZINE HYDROCHLORIDE 25 MG/1
25 TABLET ORAL EVERY 6 HOURS PRN
Qty: 5 TABLET | Refills: 0 | Status: SHIPPED | OUTPATIENT
Start: 2019-03-01 | End: 2019-03-21 | Stop reason: ALTCHOICE

## 2019-03-01 RX ORDER — OXYCODONE HYDROCHLORIDE 5 MG/1
5 TABLET ORAL PRN
Status: DISCONTINUED | OUTPATIENT
Start: 2019-03-01 | End: 2019-03-01 | Stop reason: HOSPADM

## 2019-03-01 RX ORDER — PROPOFOL 10 MG/ML
INJECTION, EMULSION INTRAVENOUS PRN
Status: DISCONTINUED | OUTPATIENT
Start: 2019-03-01 | End: 2019-03-01 | Stop reason: SDUPTHER

## 2019-03-01 RX ORDER — POVIDONE-IODINE 10 MG/G
OINTMENT TOPICAL
Status: COMPLETED | OUTPATIENT
Start: 2019-03-01 | End: 2019-03-01

## 2019-03-01 RX ORDER — FENTANYL CITRATE 50 UG/ML
50 INJECTION, SOLUTION INTRAMUSCULAR; INTRAVENOUS EVERY 5 MIN PRN
Status: DISCONTINUED | OUTPATIENT
Start: 2019-03-01 | End: 2019-03-01 | Stop reason: HOSPADM

## 2019-03-01 RX ADMIN — MIDAZOLAM 2 MG: 1 INJECTION INTRAMUSCULAR; INTRAVENOUS at 09:09

## 2019-03-01 RX ADMIN — FENTANYL CITRATE 50 MCG: 50 INJECTION INTRAMUSCULAR; INTRAVENOUS at 09:22

## 2019-03-01 RX ADMIN — SODIUM CHLORIDE: 9 INJECTION, SOLUTION INTRAVENOUS at 07:47

## 2019-03-01 RX ADMIN — METOPROLOL TARTRATE 0.5 MG: 5 INJECTION, SOLUTION INTRAVENOUS at 09:45

## 2019-03-01 RX ADMIN — FENTANYL CITRATE 100 MCG: 50 INJECTION INTRAMUSCULAR; INTRAVENOUS at 09:09

## 2019-03-01 RX ADMIN — METOPROLOL TARTRATE 0.5 MG: 5 INJECTION, SOLUTION INTRAVENOUS at 09:37

## 2019-03-01 RX ADMIN — DEXAMETHASONE SODIUM PHOSPHATE 8 MG: 4 INJECTION, SOLUTION INTRAMUSCULAR; INTRAVENOUS at 09:22

## 2019-03-01 RX ADMIN — GLYCOPYRROLATE 0.1 MG: 0.2 INJECTION, SOLUTION INTRAMUSCULAR; INTRAVENOUS at 09:10

## 2019-03-01 RX ADMIN — LIDOCAINE HYDROCHLORIDE 5 ML: 20 INJECTION, SOLUTION INFILTRATION; PERINEURAL at 09:14

## 2019-03-01 RX ADMIN — SODIUM CHLORIDE: 9 INJECTION, SOLUTION INTRAVENOUS at 09:52

## 2019-03-01 RX ADMIN — SUCCINYLCHOLINE CHLORIDE 140 MG: 20 INJECTION, SOLUTION INTRAMUSCULAR; INTRAVENOUS at 09:14

## 2019-03-01 RX ADMIN — ONDANSETRON 4 MG: 2 INJECTION INTRAMUSCULAR; INTRAVENOUS at 09:51

## 2019-03-01 RX ADMIN — PROPOFOL 200 MG: 10 INJECTION, EMULSION INTRAVENOUS at 09:14

## 2019-03-01 ASSESSMENT — PULMONARY FUNCTION TESTS
PIF_VALUE: 21
PIF_VALUE: 21
PIF_VALUE: 14
PIF_VALUE: 8
PIF_VALUE: 10
PIF_VALUE: 15
PIF_VALUE: 1
PIF_VALUE: 21
PIF_VALUE: 8
PIF_VALUE: 20
PIF_VALUE: 8
PIF_VALUE: 13
PIF_VALUE: 1
PIF_VALUE: 20
PIF_VALUE: 1
PIF_VALUE: 21
PIF_VALUE: 8
PIF_VALUE: 20
PIF_VALUE: 21
PIF_VALUE: 21
PIF_VALUE: 20
PIF_VALUE: 21
PIF_VALUE: 20
PIF_VALUE: 23
PIF_VALUE: 19
PIF_VALUE: 17
PIF_VALUE: 21
PIF_VALUE: 13
PIF_VALUE: 20
PIF_VALUE: 21
PIF_VALUE: 18
PIF_VALUE: 20
PIF_VALUE: 19
PIF_VALUE: 20
PIF_VALUE: 20
PIF_VALUE: 17
PIF_VALUE: 5
PIF_VALUE: 1
PIF_VALUE: 19
PIF_VALUE: 19
PIF_VALUE: 11
PIF_VALUE: 21
PIF_VALUE: 20
PIF_VALUE: 20
PIF_VALUE: 21
PIF_VALUE: 20
PIF_VALUE: 15

## 2019-03-01 ASSESSMENT — PAIN - FUNCTIONAL ASSESSMENT: PAIN_FUNCTIONAL_ASSESSMENT: 0-10

## 2019-03-01 ASSESSMENT — PAIN DESCRIPTION - ORIENTATION: ORIENTATION: LEFT

## 2019-03-01 ASSESSMENT — PAIN DESCRIPTION - LOCATION: LOCATION: SHOULDER

## 2019-03-01 ASSESSMENT — PAIN SCALES - GENERAL
PAINLEVEL_OUTOF10: 0

## 2019-03-01 ASSESSMENT — PAIN DESCRIPTION - PAIN TYPE: TYPE: SURGICAL PAIN

## 2019-03-05 ENCOUNTER — OFFICE VISIT (OUTPATIENT)
Dept: ORTHOPEDIC SURGERY | Age: 39
End: 2019-03-05

## 2019-03-05 VITALS — HEIGHT: 65 IN | BODY MASS INDEX: 26.66 KG/M2 | WEIGHT: 160 LBS

## 2019-03-05 DIAGNOSIS — S43.432A TEAR OF LEFT GLENOID LABRUM, INITIAL ENCOUNTER: Primary | ICD-10-CM

## 2019-03-05 PROCEDURE — 99024 POSTOP FOLLOW-UP VISIT: CPT | Performed by: ORTHOPAEDIC SURGERY

## 2019-03-13 ENCOUNTER — HOSPITAL ENCOUNTER (OUTPATIENT)
Dept: PHYSICAL THERAPY | Age: 39
Setting detail: THERAPIES SERIES
Discharge: HOME | End: 2019-03-13
Payer: COMMERCIAL

## 2019-03-13 PROCEDURE — 97161 PT EVAL LOW COMPLEX 20 MIN: CPT | Performed by: PHYSICAL THERAPIST

## 2019-03-13 PROCEDURE — 97110 THERAPEUTIC EXERCISES: CPT | Performed by: PHYSICAL THERAPIST

## 2019-03-14 ENCOUNTER — APPOINTMENT (OUTPATIENT)
Dept: PHYSICAL THERAPY | Age: 39
End: 2019-03-14
Payer: COMMERCIAL

## 2019-03-20 ENCOUNTER — HOSPITAL ENCOUNTER (OUTPATIENT)
Dept: PHYSICAL THERAPY | Age: 39
Setting detail: THERAPIES SERIES
Discharge: HOME | End: 2019-03-20
Payer: COMMERCIAL

## 2019-03-20 PROCEDURE — 97110 THERAPEUTIC EXERCISES: CPT | Performed by: PHYSICAL THERAPIST

## 2019-03-20 PROCEDURE — 97140 MANUAL THERAPY 1/> REGIONS: CPT | Performed by: PHYSICAL THERAPIST

## 2019-03-21 ENCOUNTER — OFFICE VISIT (OUTPATIENT)
Dept: ORTHOPEDIC SURGERY | Age: 39
End: 2019-03-21

## 2019-03-21 VITALS — WEIGHT: 158 LBS | BODY MASS INDEX: 26.33 KG/M2 | HEIGHT: 65 IN

## 2019-03-21 DIAGNOSIS — S43.432A TEAR OF LEFT GLENOID LABRUM, INITIAL ENCOUNTER: Primary | ICD-10-CM

## 2019-03-21 PROCEDURE — 99024 POSTOP FOLLOW-UP VISIT: CPT | Performed by: ORTHOPAEDIC SURGERY

## 2019-03-21 RX ORDER — OXYCODONE HYDROCHLORIDE AND ACETAMINOPHEN 5; 325 MG/1; MG/1
1 TABLET ORAL EVERY 4 HOURS PRN
COMMUNITY
End: 2019-06-25

## 2019-03-22 ENCOUNTER — TELEPHONE (OUTPATIENT)
Dept: ORTHOPEDIC SURGERY | Age: 39
End: 2019-03-22

## 2019-03-27 ENCOUNTER — HOSPITAL ENCOUNTER (OUTPATIENT)
Dept: PHYSICAL THERAPY | Age: 39
Setting detail: THERAPIES SERIES
Discharge: HOME | End: 2019-03-27
Payer: COMMERCIAL

## 2019-03-27 PROCEDURE — 97140 MANUAL THERAPY 1/> REGIONS: CPT

## 2019-03-27 PROCEDURE — 97110 THERAPEUTIC EXERCISES: CPT

## 2019-03-29 ENCOUNTER — HOSPITAL ENCOUNTER (OUTPATIENT)
Dept: PHYSICAL THERAPY | Age: 39
Setting detail: THERAPIES SERIES
Discharge: HOME | End: 2019-03-29
Payer: COMMERCIAL

## 2019-03-29 PROCEDURE — 97110 THERAPEUTIC EXERCISES: CPT

## 2019-04-03 ENCOUNTER — HOSPITAL ENCOUNTER (OUTPATIENT)
Dept: PHYSICAL THERAPY | Age: 39
Setting detail: THERAPIES SERIES
Discharge: HOME | End: 2019-04-03
Payer: COMMERCIAL

## 2019-04-03 PROCEDURE — 97110 THERAPEUTIC EXERCISES: CPT | Performed by: PHYSICAL THERAPIST

## 2019-04-03 NOTE — FLOWSHEET NOTE
Neuromuscular Re-ed / Therapeutic Activities                                                        Manual Intervention  Start time: 8:25    End time: 8:30       Shld /GH Mobs       Post Cap mobs       Thoracic/Rib manipualtion       CT MT/Mobs       PROM MT  flexion, gentle ER at neutral 5'              Pt. Education:  -reviewed HEP  -pt. Instructed in weaning from sling in controlled environment, pt. To continue to wear sling while sleeping for 1 additional week      Therapeutic Exercise and NMR EXR  X (11793) Provided verbal/tactile cueing for activities related to strengthening, flexibility, endurance, ROM  for improvements in scapular, scapulothoracic and UE control with self care, reaching, carrying, lifting, house/yardwork, driving/computer work. ? (48133) Provided verbal/tactile cueing for activities related to improving balance, coordination, kinesthetic sense, posture, motor skill, proprioception  to assist with  scapular, scapulothoracic and UE control with self care, reaching, carrying, lifting, house/yardwork, driving/computer work. Therapeutic Activities:    X (88393 or 51448) Provided verbal/tactile cueing for activities related to improving balance, coordination, kinesthetic sense, posture, motor skill, proprioception and motor activation to allow for proper function of scapular, scapulothoracic and UE control with self care, carrying, lifting, driving/computer work.      Home Exercise Program:    X (88444) Reviewed/Progressed HEP activities related to strengthening, flexibility, endurance, ROM of scapular, scapulothoracic and UE control with self care, reaching, carrying, lifting, house/yardwork, driving/computer work  ? (83832) Reviewed/Progressed HEP activities related to improving balance, coordination, kinesthetic sense, posture, motor skill, proprioception of scapular, scapulothoracic and UE control with self care, reaching, carrying, lifting, house/yardwork, driving/computer work Manual Treatments:  PROM / STM / Oscillations-Mobs:  G-I, II, III, IV (PA's, Inf., Post.)  X (64869) Provided manual therapy to mobilize soft tissue/joints of cervical/CT, scapular GHJ and UE for the purpose of modulating pain, promoting relaxation,  increasing ROM, reducing/eliminating soft tissue swelling/inflammation/restriction, improving soft tissue extensibility and allowing for proper ROM for normal function with self care, reaching, carrying, lifting, house/yardwork, driving/computer work    Modalities:    Ice - declined     Total treatment time: Start time: 7:58      End time: 8:30     Charges:  Timed Code Treatment Minutes: 29   Total Treatment Minutes: 32       ? EVAL - LOW (14745)   ? EVAL - MOD (08951)  ? EVAL - HIGH (08555)  ? RE-EVAL (75297)  X ZH(36631) x 2    ? IONTO  ? NMR (80291) x     ? VASO  ? Manual (86613) x    ? Other:  ? TA x       ? Mech Traction (83447)  ? ES(attended) (58489)      ? ES (un) (60506):     GOALS:  Patient stated goal: get better, return to work    Therapist goals for Patient:   Short Term Goals: To be achieved in: 2 weeks  1. Independent in HEP and progression per patient tolerance, in order to prevent re-injury. MET  2. Patient will have a decrease in pain to facilitate improvement in movement, function, and ADLs as indicated by Functional Deficits. MET  3. Patient demonstrates understanding of and compliance with precautions following surgery. MET    Long Term Goals: To be achieved in: 8-12 weeks  1. Disability index score of 20% or less for the UEFI or Quick DASH to assist with reaching prior level of function. 2. Patient will demonstrate increased AROM to 160 L shoulder pain free elevation to allow for proper joint functioning as indicated by patients Functional Deficits. 3. Patient will demonstrate an increase in Strength to at least 4+/5 throughotu to allow for proper functional mobility as indicated by patients Functional Deficits.    4. Patient will return to functional activities including reaching and dressing without increased symptoms or restriction. 5. Patient will return to work full duty without increased symptoms or restriction     Progression Towards Functional goals:  X Patient is progressing as expected towards functional goals listed. ? Progression is slowed due to complexities listed. ? Progression has been slowed due to co-morbidities. ? Plan just implemented, too soon to assess goals progression  ? Other:     Persisting Functional Limitations/Impairments:  ?Sitting ? Standing   ? Walking ? Squatting/bending    ? Stairs X - ADL's    X - Transfers X - Reaching  X - Housework X - Job related tasks  X - Driving X - Sports/Recreation   X - Other: sleeping    ASSESSMENT:      Treatment/Activity Tolerance:  X -  Patient tolerated treatment well ? Patient limited by fatique  ? Patient limited by pain  ? Patient limited by other medical complications   X - Other: Pt. Tolerated therapy today without complaints. Pt. Demonstrates improvements in shoulder mobility with manual PROM. Able to initiate pulleys without issue. Performed gentle ER PROM this date without issue. Pt. Instructed in weaning from sling. Added light resistance for shrugs and triceps without issue. Initiated SL ER without resistance through limited range. Pt. Requires continued progression of post-op protocol in order to restore L UE functional mobility and strength. Prognosis: X -  Good ? Fair  ? Poor    Patient Requires Follow-up: X -  Yes  ? No    Return to Play:    X -   N/A     ? Stage 1: Intro to Strength   ? Stage 2: Dynamic Strength and Intro to Plyometrics   ? Stage 3: Advanced Plyometrics and Intro to Throwing   ? Stage 4: Sport specific Training/Return to Sport     ? Ready to Return to Play, Meets All Above Stages   ? Not Ready for Return to Sports   Comments:      PLAN: 1-2x/wk  X - Continue per plan of care ? Alter current plan (see comments)  ? Plan of care initiated ?

## 2019-04-05 ENCOUNTER — APPOINTMENT (OUTPATIENT)
Dept: PHYSICAL THERAPY | Age: 39
End: 2019-04-05
Payer: COMMERCIAL

## 2019-04-10 ENCOUNTER — HOSPITAL ENCOUNTER (OUTPATIENT)
Dept: PHYSICAL THERAPY | Age: 39
Setting detail: THERAPIES SERIES
Discharge: HOME | End: 2019-04-10
Payer: COMMERCIAL

## 2019-04-10 PROCEDURE — 97110 THERAPEUTIC EXERCISES: CPT

## 2019-04-10 NOTE — FLOWSHEET NOTE
[address] [facility]    Physical Therapy Daily Treatment Note  Date:  4/10/2019    Patient Name:  Jelly Márquez    :  1980  MRN: [id number]  Medical/Treatment Diagnosis Information:  Diagnosis: J29.872 (ICD-10-CM) - Tear of left glenoid labrum; s/p L shoulder anterior labral repair dos: 3/13/19  Treatment Diagnosis: R53.1 weakness; M25.512 L shoulder pain  Insurance/Certification information:  PT Insurance Information: [facility]  Physician Information:  Referring Practitioner: Dr. Kilpatrick Meter of care signed (Y/N): Y    Date of Patient follow up with Physician: 19    Functional score:      Date functional scale completed: 3/13/19         Progress Note: ?  Yes  x  No  Next due by: Visit #10      Latex Allergy:  ?NO      ? YES  Preferred Language for Healthcare:   ?English       ? other:    Visit # Insurance Allowable Date Range   6 16 19-19     Pain level:  0/10 at rest, 4/10 with activity     SUBJECTIVE:  Continues to report shoulder is feeling good \"Unless I move it wrong\". Still sleeping in recliner, hasn't tried sleeping in bed.      OBJECTIVE: 4/3   Observation:    Test measurements:   Shoulder flexion: ~130 degrees manual PROM    RESTRICTIONS/PRECAUTIONS: s/p L shoulder anterior labral repair dos: 3/13/19    Exercises/Interventions:   Therapeutic Exercise / Therapeutic Activities  Start time: 8:00      End time: 8:24  Start time:       End time:     Resistance / level Sets/sec Reps Notes   gripping  1 30    Table slides flex 10\" 10    ER ranger @ neutral 10\" 10 Start 4/3, no more than 30 degrees ER, performed in clinic only   pulleys flex 10\" 10 Start 4/3   Elbow AROM  3 10           shrugs 2# 3 10 ^4/3   Scapular retraction  3 10           IR isometric  10\" 10 Start 3/20   ER isometric  10\" 10 Start 3/20   SL ER 0# 3 10 Start 4/3   Triceps band blue 3 10 Start 4/3   Serratus punches  3 10 Added 4/10                        Neuromuscular Re-ed / Therapeutic Activities                                                        Manual Intervention  Start time: 8:24    End time: 8:33       Shld /GH Mobs       Post Cap mobs       Thoracic/Rib manipualtion       CT MT/Mobs       PROM MT  flexion, gentle ER at neutral 9'              Pt. Education:  -reviewed HEP  -pt. Instructed in weaning from sling in controlled environment, pt. To continue to wear sling while sleeping for 1 additional week      Therapeutic Exercise and NMR EXR  X (03522) Provided verbal/tactile cueing for activities related to strengthening, flexibility, endurance, ROM  for improvements in scapular, scapulothoracic and UE control with self care, reaching, carrying, lifting, house/yardwork, driving/computer work. ? (34245) Provided verbal/tactile cueing for activities related to improving balance, coordination, kinesthetic sense, posture, motor skill, proprioception  to assist with  scapular, scapulothoracic and UE control with self care, reaching, carrying, lifting, house/yardwork, driving/computer work. Therapeutic Activities:    X (86901 or 96727) Provided verbal/tactile cueing for activities related to improving balance, coordination, kinesthetic sense, posture, motor skill, proprioception and motor activation to allow for proper function of scapular, scapulothoracic and UE control with self care, carrying, lifting, driving/computer work.      Home Exercise Program:    X (18357) Reviewed/Progressed HEP activities related to strengthening, flexibility, endurance, ROM of scapular, scapulothoracic and UE control with self care, reaching, carrying, lifting, house/yardwork, driving/computer work  ? (52822) Reviewed/Progressed HEP activities related to improving balance, coordination, kinesthetic sense, posture, motor skill, proprioception of scapular, scapulothoracic and UE control with self care, reaching, carrying, lifting, house/yardwork, driving/computer work      Manual Treatments:  PROM / STM / Oscillations-Mobs:  G-I, II, III, IV (PA's, Inf., Post.)  X (97595) Provided manual therapy to mobilize soft tissue/joints of cervical/CT, scapular GHJ and UE for the purpose of modulating pain, promoting relaxation,  increasing ROM, reducing/eliminating soft tissue swelling/inflammation/restriction, improving soft tissue extensibility and allowing for proper ROM for normal function with self care, reaching, carrying, lifting, house/yardwork, driving/computer work    Modalities:    Ice - declined     Total treatment time: Start time: 8:00      End time: 8:33     Charges:  Timed Code Treatment Minutes: 33   Total Treatment Minutes: 33       ? EVAL - LOW (30650)   ? EVAL - MOD (87229)  ? EVAL - HIGH (77336)  ? RE-EVAL (42017)  X IP(02096) x 2    ? IONTO  ? NMR (32825) x     ? VASO  ? Manual (19942) x    ? Other:  ? TA x       ? Mech Traction (63219)  ? ES(attended) (00175)      ? ES (un) (01378):     GOALS:  Patient stated goal: get better, return to work    Therapist goals for Patient:   Short Term Goals: To be achieved in: 2 weeks  1. Independent in HEP and progression per patient tolerance, in order to prevent re-injury. MET  2. Patient will have a decrease in pain to facilitate improvement in movement, function, and ADLs as indicated by Functional Deficits. MET  3. Patient demonstrates understanding of and compliance with precautions following surgery. MET    Long Term Goals: To be achieved in: 8-12 weeks  1. Disability index score of 20% or less for the UEFI or Quick DASH to assist with reaching prior level of function. 2. Patient will demonstrate increased AROM to 160 L shoulder pain free elevation to allow for proper joint functioning as indicated by patients Functional Deficits. 3. Patient will demonstrate an increase in Strength to at least 4+/5 throughotu to allow for proper functional mobility as indicated by patients Functional Deficits.    4. Patient will return to functional activities including reaching and dressing without increased symptoms or restriction. 5. Patient will return to work full duty without increased symptoms or restriction     Progression Towards Functional goals:  X Patient is progressing as expected towards functional goals listed. ? Progression is slowed due to complexities listed. ? Progression has been slowed due to co-morbidities. ? Plan just implemented, too soon to assess goals progression  ? Other:     Persisting Functional Limitations/Impairments:  ?Sitting ? Standing   ? Walking ? Squatting/bending    ? Stairs X - ADL's    X - Transfers X - Reaching  X - Housework X - Job related tasks  X - Driving X - Sports/Recreation   X - Other: sleeping    ASSESSMENT:  Pt's motion is within normal limits considering limitations per protocol with only slight joint tightness noted into flexion ~100 degrees. Added supine serratus punches to program this date without issues. Pt demonstrates good technique with exercises and only needs occasional cues to maintain elbow close to side with ER stretching and sidelying ER. Pt continues to be limited in activity due to healing and protocol. Discussed weaning out of brace at night for sleeping and sleeping positions to minimize discomfort. Continue to progress exercises as tolerated to improve strength, motion, function for independent self care and daily functional tasks. Treatment/Activity Tolerance:  X -  Patient tolerated treatment well ? Patient limited by fatique  ? Patient limited by pain  ? Patient limited by other medical complications  ?  - Other:    Prognosis: X -  Good ? Fair  ? Poor    Patient Requires Follow-up: X -  Yes  ? No    Return to Play:    X -   N/A   ? Stage 1: Intro to Strength   ? Stage 2: Dynamic Strength and Intro to Plyometrics   ? Stage 3: Advanced Plyometrics and Intro to Throwing   ? Stage 4: Sport specific Training/Return to Sport   ?   Ready to Return to Play, Meets All Above Stages   ? Not Ready for Return to Sports   Comments:      PLAN: 1-2x/wk  X - Continue per plan of care ? Alter current plan (see comments)  ? Plan of care initiated ? Hold pending MD visit ?  Discharge    Electronically signed by: Matias August [id number]

## 2019-04-12 ENCOUNTER — APPOINTMENT (OUTPATIENT)
Dept: PHYSICAL THERAPY | Age: 39
End: 2019-04-12
Payer: COMMERCIAL

## 2019-04-17 ENCOUNTER — HOSPITAL ENCOUNTER (OUTPATIENT)
Dept: PHYSICAL THERAPY | Age: 39
Setting detail: THERAPIES SERIES
Discharge: HOME | End: 2019-04-17
Payer: COMMERCIAL

## 2019-04-17 PROCEDURE — 97140 MANUAL THERAPY 1/> REGIONS: CPT | Performed by: PHYSICAL THERAPIST

## 2019-04-17 PROCEDURE — 97110 THERAPEUTIC EXERCISES: CPT | Performed by: PHYSICAL THERAPIST

## 2019-04-17 NOTE — FLOWSHEET NOTE
Scapular retraction green 3 10 ^4/17          IR isometric Band npv 10\" 10 Start 3/20   ER isometric Band npv 10\" 10 Start 3/20   SL ER 2# 3 10 ^4/17   Triceps band blue 3 10 Start 4/3   Serratus punches 3# 3 10 ^4/17                        Neuromuscular Re-ed / Therapeutic Activities  Start time: 7:49    End time: 7:52       Rhythmic stabilization Supine, 90 shd flex 30\" 3 Start 4/17, gentle manual pressue                                             Manual Intervention  Start time:7:38    End time: 7:48       Shld /GH Mobs       Post Cap mobs       Thoracic/Rib manipualtion       CT MT/Mobs       PROM MT  flexion, scaption, gentle ER at neutral 10'              Pt. Education:  -reviewed HEP  -pt. Instructed in weaning from sling in controlled environment, pt. To continue to wear sling while sleeping for 1 additional week      Therapeutic Exercise and NMR EXR  X (15359) Provided verbal/tactile cueing for activities related to strengthening, flexibility, endurance, ROM  for improvements in scapular, scapulothoracic and UE control with self care, reaching, carrying, lifting, house/yardwork, driving/computer work. ? (43026) Provided verbal/tactile cueing for activities related to improving balance, coordination, kinesthetic sense, posture, motor skill, proprioception  to assist with  scapular, scapulothoracic and UE control with self care, reaching, carrying, lifting, house/yardwork, driving/computer work. Therapeutic Activities:    X (95388 or 51374) Provided verbal/tactile cueing for activities related to improving balance, coordination, kinesthetic sense, posture, motor skill, proprioception and motor activation to allow for proper function of scapular, scapulothoracic and UE control with self care, carrying, lifting, driving/computer work.      Home Exercise Program:    X (23024) Reviewed/Progressed HEP activities related to strengthening, flexibility, endurance, ROM of scapular, scapulothoracic and UE control with self care, reaching, carrying, lifting, house/yardwork, driving/computer work  ? (88252) Reviewed/Progressed HEP activities related to improving balance, coordination, kinesthetic sense, posture, motor skill, proprioception of scapular, scapulothoracic and UE control with self care, reaching, carrying, lifting, house/yardwork, driving/computer work      Manual Treatments:  PROM / STM / Oscillations-Mobs:  G-I, II, III, IV (PA's, Inf., Post.)  X (15617) Provided manual therapy to mobilize soft tissue/joints of cervical/CT, scapular GHJ and UE for the purpose of modulating pain, promoting relaxation,  increasing ROM, reducing/eliminating soft tissue swelling/inflammation/restriction, improving soft tissue extensibility and allowing for proper ROM for normal function with self care, reaching, carrying, lifting, house/yardwork, driving/computer work    Modalities:    Ice - declined     Total treatment time: Start time:7:28      End time: 8:10    Charges:  Timed Code Treatment Minutes: 38   Total Treatment Minutes: 42       ? EVAL - LOW (42197)   ? EVAL - MOD (47180)  ? EVAL - HIGH (10945)  ? RE-EVAL (07698)  X ZN(79772) x 2    ? IONTO  ? NMR (95695) x     ? VASO  X ? Manual (23726) x 1   ? Other:  ? TA x       ? Mech Traction (51040)  ? ES(attended) (81623)      ? ES (un) (73744):     GOALS:  Patient stated goal: get better, return to work    Therapist goals for Patient:   Short Term Goals: To be achieved in: 2 weeks  1. Independent in HEP and progression per patient tolerance, in order to prevent re-injury. MET  2. Patient will have a decrease in pain to facilitate improvement in movement, function, and ADLs as indicated by Functional Deficits. MET  3. Patient demonstrates understanding of and compliance with precautions following surgery. MET    Long Term Goals: To be achieved in: 8-12 weeks  1.  Disability index score of 20% or less for the UEFI or Quick DASH to assist with reaching prior level of function. 2. Patient will demonstrate increased AROM to 160 L shoulder pain free elevation to allow for proper joint functioning as indicated by patients Functional Deficits. 3. Patient will demonstrate an increase in Strength to at least 4+/5 throughotu to allow for proper functional mobility as indicated by patients Functional Deficits. 4. Patient will return to functional activities including reaching and dressing without increased symptoms or restriction. 5. Patient will return to work full duty without increased symptoms or restriction     Progression Towards Functional goals:  X Patient is progressing as expected towards functional goals listed. ? Progression is slowed due to complexities listed. ? Progression has been slowed due to co-morbidities. ? Plan just implemented, too soon to assess goals progression  ? Other:     Persisting Functional Limitations/Impairments:  ?Sitting ? Standing   ? Walking ? Squatting/bending    ? Stairs X - ADL's     Transfers X - Reaching  X - Housework X - Job related tasks  X - Driving X - Sports/Recreation   X - Other: sleeping    ASSESSMENT:      Treatment/Activity Tolerance:  X -  Patient tolerated treatment well ? Patient limited by fatique  ? Patient limited by pain  ? Patient limited by other medical complications  X ?  - Other: Pt. Tolerated therapy today without complaints. Pt. Demonstrates improvements in shoulder PROM within restrictions. Reviewed restrictions including avoiding abduction at this time. Pt. demonstrates good technique with IR/ER isometrics. Pt. Tonio Gey by gentle rhythmic stabilization with slight discomforted noted with horizontal adduction pressure. Able to increase resistance with serratus punches and SL ER with cueing throughout to decrease compensations when fatigued. Pt. Requires continued progression of post-op protocol in order to restore shoulder functional mobility and strength for eventual return to work.      Prognosis: X - Good ? Fair  ? Poor    Patient Requires Follow-up: X -  Yes  ? No    Return to Play:    X -   N/A   ? Stage 1: Intro to Strength   ? Stage 2: Dynamic Strength and Intro to Plyometrics   ? Stage 3: Advanced Plyometrics and Intro to Throwing   ? Stage 4: Sport specific Training/Return to Sport   ? Ready to Return to Play, Meets All Above Stages   ? Not Ready for Return to Sports   Comments:      PLAN: 1-2x/wk  X - Continue per plan of care ? Alter current plan (see comments)  ? Plan of care initiated ? Hold pending MD visit ?  Discharge    Electronically signed by: Kristen Michelle PT

## 2019-04-19 ENCOUNTER — HOSPITAL ENCOUNTER (OUTPATIENT)
Dept: PHYSICAL THERAPY | Age: 39
Setting detail: THERAPIES SERIES
Discharge: HOME | End: 2019-04-19
Payer: COMMERCIAL

## 2019-04-19 PROCEDURE — 97140 MANUAL THERAPY 1/> REGIONS: CPT | Performed by: PHYSICAL THERAPIST

## 2019-04-19 PROCEDURE — 97110 THERAPEUTIC EXERCISES: CPT | Performed by: PHYSICAL THERAPIST

## 2019-04-19 NOTE — FLOWSHEET NOTE
[address] [facility]    Physical Therapy Daily Treatment Note  Date:  2019    Patient Name:  June Montero    :  1980  MRN: [id number]  Medical/Treatment Diagnosis Information:  Diagnosis: J27.536 (ICD-10-CM) - Tear of left glenoid labrum; s/p L shoulder anterior labral repair dos: 3/13/19  Treatment Diagnosis: R53.1 weakness; M25.512 L shoulder pain  Insurance/Certification information:  PT Insurance Information: [facility]  Physician Information:  Referring Practitioner: Dr. Deacon Cerrato of care signed (Y/N): Y    Date of Patient follow up with Physician: 19    Functional score:      Date functional scale completed: 3/13/19         Progress Note: ?  Yes  x  No  Next due by: Visit #10      Latex Allergy:  ?NO      ? YES  Preferred Language for Healthcare:   ?English       ? other:    Visit # Insurance Allowable Date Range   8 16 19-19     Pain level:  2/10     SUBJECTIVE:  Pt. Reports that his shoulder is a little sore this morning. The soreness is located on the top of his shoulder this morning. Yesterday he had more soreness through his bicep. Pt. Notes no issues with increases with previous therapy session. Pt. States that it continues to be difficulty to sleep. Pt. Reports compliance with HEP 2x/day.       OBJECTIVE:    Observation:    Test measurements:     PROM AROM     L R L R   Shoulder Flexion  150 NT NT   Shoulder Abduction  NT NT NT   Shoulder External Rotation  NT NT NT NT   Shoulder Internal Rotation  NT NT NT            RESTRICTIONS/PRECAUTIONS: s/p L shoulder anterior labral repair dos: 3/13/19    Exercises/Interventions:   Therapeutic Exercise / Therapeutic Activities  Start time: 7:30      End time: 7:40  Start time: 7:43     End time: 7:58 Resistance / level Sets/sec Reps Notes   gripping  1 30    Table slides flex 10\" 10    ER ranger Start 4/3, no more than 30 degrees ER, performed in clinic only   pulleys Flex, scaption 10\" 10 4/17 added scaption          biceps 3# 3 10 ^4/17   shrugs 3# 3 10 ^4/17   Scapular retraction green 3 10 ^4/17          IR isometric Band npv 10\" 10 Start 3/20   ER isometric orange 3 10 ^4/19   SL ER 3# 3 10 ^4/19   Triceps band blue 3 10 Start 4/3   Serratus punches 3# 3 10 ^4/17                        Neuromuscular Re-ed / Therapeutic Activities  Start time: 8:00    End time: 8:03       Rhythmic stabilization Supine, 90 shd flex 30\" 3 Start 4/17, gentle manual pressue                                             Manual Intervention  Start time: 8:04    End time: 8:15       Shld /GH Mobs       Post Cap mobs       Thoracic/Rib manipualtion       CT MT/Mobs       PROM MT  flexion, scaption, gentle ER at neutral 11'              Pt. Education:  -reviewed HEP      Therapeutic Exercise and NMR EXR  X (40131) Provided verbal/tactile cueing for activities related to strengthening, flexibility, endurance, ROM  for improvements in scapular, scapulothoracic and UE control with self care, reaching, carrying, lifting, house/yardwork, driving/computer work. ? (00137) Provided verbal/tactile cueing for activities related to improving balance, coordination, kinesthetic sense, posture, motor skill, proprioception  to assist with  scapular, scapulothoracic and UE control with self care, reaching, carrying, lifting, house/yardwork, driving/computer work. Therapeutic Activities:    X (26720 or 39646) Provided verbal/tactile cueing for activities related to improving balance, coordination, kinesthetic sense, posture, motor skill, proprioception and motor activation to allow for proper function of scapular, scapulothoracic and UE control with self care, carrying, lifting, driving/computer work.      Home Exercise Program:    X (47876) Reviewed/Progressed HEP activities related to strengthening, flexibility, endurance, ROM of scapular, scapulothoracic and UE control with self care, reaching, carrying, lifting, house/yardwork, driving/computer work  ? (85977) Reviewed/Progressed HEP activities related to improving balance, coordination, kinesthetic sense, posture, motor skill, proprioception of scapular, scapulothoracic and UE control with self care, reaching, carrying, lifting, house/yardwork, driving/computer work      Manual Treatments:  PROM / STM / Oscillations-Mobs:  G-I, II, III, IV (PA's, Inf., Post.)  X (88258) Provided manual therapy to mobilize soft tissue/joints of cervical/CT, scapular GHJ and UE for the purpose of modulating pain, promoting relaxation,  increasing ROM, reducing/eliminating soft tissue swelling/inflammation/restriction, improving soft tissue extensibility and allowing for proper ROM for normal function with self care, reaching, carrying, lifting, house/yardwork, driving/computer work    Modalities:    Ice - declined     Total treatment time: Start time:7:30      End time: 8:15    Charges:  Timed Code Treatment Minutes: 39   Total Treatment Minutes: 39       ? EVAL - LOW (90043)   ? EVAL - MOD (75016)  ? EVAL - HIGH (03561)  ? RE-EVAL (65495)  X UT(98480) x 2    ? IONTO  ? NMR (47678) x     ? VASO  X ? Manual (19088) x 1   ? Other:  ? TA x       ? Mech Traction (20726)  ? ES(attended) (28679)      ? ES (un) (16716):     GOALS:  Patient stated goal: get better, return to work    Therapist goals for Patient:   Short Term Goals: To be achieved in: 2 weeks  1. Independent in HEP and progression per patient tolerance, in order to prevent re-injury. MET  2. Patient will have a decrease in pain to facilitate improvement in movement, function, and ADLs as indicated by Functional Deficits. MET  3. Patient demonstrates understanding of and compliance with precautions following surgery. MET    Long Term Goals: To be achieved in: 8-12 weeks  1. Disability index score of 20% or less for the UEFI or Quick DASH to assist with reaching prior level of function.    2. Patient will demonstrate increased AROM to 160 L shoulder pain free elevation to allow for proper joint functioning as indicated by patients Functional Deficits. 3. Patient will demonstrate an increase in Strength to at least 4+/5 throughotu to allow for proper functional mobility as indicated by patients Functional Deficits. 4. Patient will return to functional activities including reaching and dressing without increased symptoms or restriction. 5. Patient will return to work full duty without increased symptoms or restriction     Progression Towards Functional goals:  X Patient is progressing as expected towards functional goals listed. ? Progression is slowed due to complexities listed. ? Progression has been slowed due to co-morbidities. ? Plan just implemented, too soon to assess goals progression  ? Other:     Persisting Functional Limitations/Impairments:  ?Sitting ? Standing   ? Walking ? Squatting/bending    ? Stairs X - ADL's     Transfers X - Reaching  X - Housework X - Job related tasks  X - Driving X - Sports/Recreation   X - Other: sleeping    ASSESSMENT:      Treatment/Activity Tolerance:  X -  Patient tolerated treatment well ? Patient limited by fatique  ? Patient limited by pain  ? Patient limited by other medical complications  X ?  - Other: Pt. Tolerated therapy today without complaints. Pt. Demonstrates good shoulder scaption mobility with pulleys and with manual PROM. Plan to increase range with ER and abduction NPV per protocol. Continued with IR isometric but upgraded ER to band resistance activities with cueing require throughout for correct movement and to decrease compensations. Increased rest breaks required with SL ER. No soreness or pain noted this date with rhythmic stabilization. Occasional cueing required for control and timing with scapular control exercises. Pt. Requires continued progression of skilled therapy in order to restore UE functional mobility as protocol allows. Prognosis: X -  Good ? Fair  ? Poor    Patient Requires Follow-up: X -  Yes  ? No    Return to Play:    X -   N/A   ? Stage 1: Intro to Strength   ? Stage 2: Dynamic Strength and Intro to Plyometrics   ? Stage 3: Advanced Plyometrics and Intro to Throwing   ? Stage 4: Sport specific Training/Return to Sport   ? Ready to Return to Play, Meets All Above Stages   ? Not Ready for Return to Sports   Comments:      PLAN: 1-2x/wk  X - Continue per plan of care ? Alter current plan (see comments)  ? Plan of care initiated ? Hold pending MD visit ?  Discharge    Electronically signed by: Mauri Isaac PT

## 2019-04-23 ENCOUNTER — OFFICE VISIT (OUTPATIENT)
Dept: ORTHOPEDIC SURGERY | Age: 39
End: 2019-04-23

## 2019-04-23 VITALS
DIASTOLIC BLOOD PRESSURE: 86 MMHG | HEART RATE: 81 BPM | HEIGHT: 65 IN | SYSTOLIC BLOOD PRESSURE: 119 MMHG | WEIGHT: 158.07 LBS | BODY MASS INDEX: 26.34 KG/M2

## 2019-04-23 DIAGNOSIS — S43.432A TEAR OF LEFT GLENOID LABRUM, INITIAL ENCOUNTER: Primary | ICD-10-CM

## 2019-04-23 PROCEDURE — 99024 POSTOP FOLLOW-UP VISIT: CPT | Performed by: ORTHOPAEDIC SURGERY

## 2019-04-24 ENCOUNTER — HOSPITAL ENCOUNTER (OUTPATIENT)
Dept: PHYSICAL THERAPY | Age: 39
Setting detail: THERAPIES SERIES
Discharge: HOME | End: 2019-04-24
Payer: COMMERCIAL

## 2019-04-24 PROCEDURE — 97110 THERAPEUTIC EXERCISES: CPT | Performed by: PHYSICAL THERAPIST

## 2019-04-24 PROCEDURE — 97140 MANUAL THERAPY 1/> REGIONS: CPT | Performed by: PHYSICAL THERAPIST

## 2019-04-24 NOTE — FLOWSHEET NOTE
[address] [facility]    Physical Therapy Daily Treatment Note  Date:  2019    Patient Name:  Orlan Cushing    :  1980  MRN: [id number]  Medical/Treatment Diagnosis Information:  Diagnosis: Y90.770 (ICD-10-CM) - Tear of left glenoid labrum; s/p L shoulder anterior labral repair dos: 3/13/19  Treatment Diagnosis: R53.1 weakness; M25.512 L shoulder pain  Insurance/Certification information:  PT Insurance Information: [facility]  Physician Information:  Referring Practitioner: Dr. Rufus Bliss of care signed (Y/N): Y    Date of Patient follow up with Physician: 19    Functional score:      Date functional scale completed: 3/13/19         Progress Note: ?  Yes  x  No  Next due by: Visit #10  PROGRESS NOTE NPV    Latex Allergy:  ?NO      ? YES  Preferred Language for Healthcare:   ?English       ? other:    Visit # Insurance Allowable Date Range   9 16 19-19     Pain level:  0/10     SUBJECTIVE:  Pt. Reports that his shoulder is feeling really good today and he has no pain at this time. Pt. Saw referring MD who is pleased with his progress at this time. Pt. Reports compliance with HEP.      OBJECTIVE:    Observation:    Test measurements:     PROM AROM     L R L R   Shoulder Flexion  150 NT NT   Shoulder Abduction  NT NT NT   Shoulder External Rotation  NT NT NT NT   Shoulder Internal Rotation  NT NT NT            RESTRICTIONS/PRECAUTIONS: s/p L shoulder anterior labral repair dos: 3/13/19    Exercises/Interventions:   Therapeutic Exercise / Therapeutic Activities  Start time: 7:28     End time: 7:40  Start time: 8:06    End time:8:25  Resistance / level   Sets/sec Reps Notes   gripping  1 30    Table slides flex 10\" 10    ER ranger @ 70 10\" 10 ^   pulleys Flex, scaption 10\" 10  added scaption   IR towel  10\" 10 Added    Wall climbs  10\" 10 Added           biceps 4# 3 10 ^   shrugs 4# 3 10 ^   Scapular retraction green 3 10 ^4/17          IR band orange 3 10 Start 3/20   ^4/24   ER band orange 3 10 ^4/19  ^4/24   SL ER 4# 3 10 ^4/24   Triceps band blue 3 10 Start 4/3   Serratus punches 4# 3 10 ^4/24                        Neuromuscular Re-ed / Therapeutic Activities  Start time:7:58     End time: 8:05       Rhythmic stabilization Supine, 90 shd flex 30\" 3 Start 4/17, gentle manual pressue   Supine alphabet 4# 2x  Start 4/24                                      Manual Intervention  Start time: 7:44    End time: 7:56        Shld /GH Mobs       Post Cap mobs       Thoracic/Rib manipualtion       CT MT/Mobs       PROM MT  flexion, abduction, gentle ER 12'              Pt. Education:  -reviewed HEP      Therapeutic Exercise and NMR EXR  X (79216) Provided verbal/tactile cueing for activities related to strengthening, flexibility, endurance, ROM  for improvements in scapular, scapulothoracic and UE control with self care, reaching, carrying, lifting, house/yardwork, driving/computer work. ? (47471) Provided verbal/tactile cueing for activities related to improving balance, coordination, kinesthetic sense, posture, motor skill, proprioception  to assist with  scapular, scapulothoracic and UE control with self care, reaching, carrying, lifting, house/yardwork, driving/computer work. Therapeutic Activities:    X (99735 or 36618) Provided verbal/tactile cueing for activities related to improving balance, coordination, kinesthetic sense, posture, motor skill, proprioception and motor activation to allow for proper function of scapular, scapulothoracic and UE control with self care, carrying, lifting, driving/computer work.      Home Exercise Program:    X (93857) Reviewed/Progressed HEP activities related to strengthening, flexibility, endurance, ROM of scapular, scapulothoracic and UE control with self care, reaching, carrying, lifting, house/yardwork, driving/computer work  ? (41835) Reviewed/Progressed HEP activities related to improving balance, coordination, kinesthetic sense, posture, motor skill, proprioception of scapular, scapulothoracic and UE control with self care, reaching, carrying, lifting, house/yardwork, driving/computer work      Manual Treatments:  PROM / STM / Oscillations-Mobs:  G-I, II, III, IV (PA's, Inf., Post.)  X (44638) Provided manual therapy to mobilize soft tissue/joints of cervical/CT, scapular GHJ and UE for the purpose of modulating pain, promoting relaxation,  increasing ROM, reducing/eliminating soft tissue swelling/inflammation/restriction, improving soft tissue extensibility and allowing for proper ROM for normal function with self care, reaching, carrying, lifting, house/yardwork, driving/computer work    Modalities:    Ice - declined     Total treatment time: Start time:7:28      End time: 8:25    Charges:  Timed Code Treatment Minutes: 50'   Total Treatment Minutes: 57       ? EVAL - LOW (43205)   ? EVAL - MOD (54769)  ? EVAL - HIGH (23876)  ? RE-EVAL (36806)  X YQ(83004) x 2    ? IONTO  ? NMR (76636) x     ? VASO  X ? Manual (98320) x 1  ? Other:  ? TA x       ? Mech Traction (42016)  ? ES(attended) (23559)     ? ES (un) (38175):     GOALS:  Patient stated goal: get better, return to work    Therapist goals for Patient:   Short Term Goals: To be achieved in: 2 weeks  1. Independent in HEP and progression per patient tolerance, in order to prevent re-injury. MET  2. Patient will have a decrease in pain to facilitate improvement in movement, function, and ADLs as indicated by Functional Deficits. MET  3. Patient demonstrates understanding of and compliance with precautions following surgery. MET    Long Term Goals: To be achieved in: 8-12 weeks  1. Disability index score of 20% or less for the UEFI or Quick DASH to assist with reaching prior level of function.    2. Patient will demonstrate increased AROM to 160 L shoulder pain free elevation to allow for proper joint functioning as indicated by patients Functional Deficits. 3. Patient will demonstrate an increase in Strength to at least 4+/5 throughotu to allow for proper functional mobility as indicated by patients Functional Deficits. 4. Patient will return to functional activities including reaching and dressing without increased symptoms or restriction. 5. Patient will return to work full duty without increased symptoms or restriction     Progression Towards Functional goals:  X Patient is progressing as expected towards functional goals listed. ? Progression is slowed due to complexities listed. ? Progression has been slowed due to co-morbidities. ? Plan just implemented, too soon to assess goals progression  ? Other:     Persisting Functional Limitations/Impairments:  ?Sitting ? Standing   ? Walking ? Squatting/bending    ? Stairs X - ADL's     Transfers X - Reaching  X - Housework X - Job related tasks  X - Driving X - Sports/Recreation   X - Other: sleeping    ASSESSMENT:      Treatment/Activity Tolerance:  X -  Patient tolerated treatment well ? Patient limited by fatique  ? Patient limited by pain  ? Patient limited by other medical complications  X ?  - Other: Pt. Tolerated therapy this date without issue. Per protocol, ROM restrictions lifted at this time, therefore increased stretching activities. Mild end range deficits with manual PROM as expected. Added wall walks in this date with good form and pt awareness of body positioning and shoulder elevation. Minimal tactile cueing needed to minimize UT contraction. Also added in theraband IR without report of issues. Pt did have some minor report of soreness with theraband ER, discussed modifying resistance next visit to minimize soreness in shoulder. Pt. Requires continue progression of post-op protocol in order to restore UE function and return to full duty at work. Prognosis: X -  Good ? Fair  ? Poor    Patient Requires Follow-up: X -  Yes  ? No    Return to Play:    X -   N/A   ?   Stage 1: Intro to Strength   ? Stage 2: Dynamic Strength and Intro to Plyometrics   ? Stage 3: Advanced Plyometrics and Intro to Throwing   ? Stage 4: Sport specific Training/Return to Sport   ? Ready to Return to Play, Meets All Above Stages   ? Not Ready for Return to Sports   Comments:      PLAN: 1-2x/wk  X - Continue per plan of care ? Alter current plan (see comments)  ? Plan of care initiated ? Hold pending MD visit ?  Discharge    Electronically signed by: Connor Tatum PT      Session assisted by Willadean [facility] [id number]

## 2019-04-26 ENCOUNTER — HOSPITAL ENCOUNTER (OUTPATIENT)
Dept: PHYSICAL THERAPY | Age: 39
Setting detail: THERAPIES SERIES
Discharge: HOME | End: 2019-04-26
Payer: COMMERCIAL

## 2019-04-26 PROCEDURE — 97110 THERAPEUTIC EXERCISES: CPT | Performed by: PHYSICAL THERAPIST

## 2019-04-26 PROCEDURE — 97140 MANUAL THERAPY 1/> REGIONS: CPT | Performed by: PHYSICAL THERAPIST

## 2019-04-26 NOTE — PLAN OF CARE
Physician: 6/25/19    Functional score:      Date functional scale completed: 4/26/19    PT G-Codes  Functional Assessment Tool Used: QuickDASH  Score: 45% limitation    Progress Note: x  Yes    No  Next due by: Visit #20    Latex Allergy:  ?NO      ? YES  Preferred Language for Healthcare:   ?English       ? other:    Visit # Insurance Allowable Date Range   10 16 1/23/19-5/24/19     Pain level:  0/10     SUBJECTIVE:  Pt. Denies shoulder pain at this time. Pt. Reports occasional pain with reaching activities. Pt. Reports compliance with HEP.      OBJECTIVE: 4/26   Observation:    Test measurements:     PROM AROM     L R L R   Shoulder Flexion  153    Shoulder Abduction  135 NT 87   Shoulder External Rotation  67 NT NT NT   Shoulder Internal Rotation  ~20 NT ~L4      Strength not formally tested at this time      RESTRICTIONS/PRECAUTIONS: s/p L shoulder anterior labral repair dos: 3/13/19    Exercises/Interventions:   Therapeutic Exercise / Therapeutic Activities  Start time: 7:37     End time: 8:03 Resistance / level   Sets/sec Reps Notes   gripping    dc   Table slides flex 10\" 10    ER ranger @ 70 10\" 10 ^4/24   pulleys Flex, scaption 10\" 10 4/17 added scaption   IR towel  10\" 10 Added 4/24   Wall climbs  10\" 10 Added 4/24          biceps 4# 3 10 ^4/24   shrugs 4# 3 10 ^4/24   Scapular retraction green 3 10 ^4/17          IR band green 3 10 ^4/26   ER band green 3 10 ^4/26   SL ER 4# 3 10 ^4/24   Triceps band blue 3 10 Start 4/3   Serratus punches 4# 3 10 ^4/24   Push-up plus wall 3 10 Start 4/26                 Neuromuscular Re-ed / Therapeutic Activities  Start time: 8:08    End time: 8:10       Rhythmic stabilization Supine, 90 shd flex 30\" 3 Start 4/17, gentle manual pressue   Supine alphabet 4# 2x  Start 4/24                                      Manual Intervention  Start time: 8:11     End time: 8:23        Shld /GH Mobs       Post Cap mobs       Thoracic/Rib manipualtion       CT MT/Mobs       PROM MT flexion, abduction, gentle rotation 12'  Humeral head stabilization with rotation            Pt. Education:  -reviewed HEP      Therapeutic Exercise and NMR EXR  X (08410) Provided verbal/tactile cueing for activities related to strengthening, flexibility, endurance, ROM  for improvements in scapular, scapulothoracic and UE control with self care, reaching, carrying, lifting, house/yardwork, driving/computer work. ? (94600) Provided verbal/tactile cueing for activities related to improving balance, coordination, kinesthetic sense, posture, motor skill, proprioception  to assist with  scapular, scapulothoracic and UE control with self care, reaching, carrying, lifting, house/yardwork, driving/computer work. Therapeutic Activities:    X (74992 or 04865) Provided verbal/tactile cueing for activities related to improving balance, coordination, kinesthetic sense, posture, motor skill, proprioception and motor activation to allow for proper function of scapular, scapulothoracic and UE control with self care, carrying, lifting, driving/computer work.      Home Exercise Program:    X (85678) Reviewed/Progressed HEP activities related to strengthening, flexibility, endurance, ROM of scapular, scapulothoracic and UE control with self care, reaching, carrying, lifting, house/yardwork, driving/computer work  ? (52199) Reviewed/Progressed HEP activities related to improving balance, coordination, kinesthetic sense, posture, motor skill, proprioception of scapular, scapulothoracic and UE control with self care, reaching, carrying, lifting, house/yardwork, driving/computer work      Manual Treatments:  PROM / STM / Oscillations-Mobs:  G-I, II, III, IV (PA's, Inf., Post.)  X ([id number]) Provided manual therapy to mobilize soft tissue/joints of cervical/CT, scapular GHJ and UE for the purpose of modulating pain, promoting relaxation,  increasing ROM, reducing/eliminating soft tissue swelling/inflammation/restriction, improving soft tissue extensibility and allowing for proper ROM for normal function with self care, reaching, carrying, lifting, house/yardwork, driving/computer work    Modalities:    Ice - declined     Total treatment time: Start time:7:31      End time: 8:23    Charges:  Timed Code Treatment Minutes: 40   Total Treatment Minutes: 52       ? EVAL - LOW (20038)   ? EVAL - MOD (69182)  ? EVAL - HIGH (25160)  ? RE-EVAL (07535)  X HC(10495) x 2    ? IONTO  ? NMR (27972) x     ? VASO  X ? Manual (39861) x 1  ? Other:  ? TA x       ? Mech Traction (19310)  ? ES(attended) (01535)     ? ES (un) (72845):     GOALS:  Patient stated goal: get better, return to work    Therapist goals for Patient:   Short Term Goals: To be achieved in: 2 weeks  1. Independent in HEP and progression per patient tolerance, in order to prevent re-injury. MET  2. Patient will have a decrease in pain to facilitate improvement in movement, function, and ADLs as indicated by Functional Deficits. MET  3. Patient demonstrates understanding of and compliance with precautions following surgery. MET    Long Term Goals: To be achieved in: 8-12 weeks ONGOING  1. Disability index score of 20% or less for the UEFI or Quick DASH to assist with reaching prior level of function. 2. Patient will demonstrate increased AROM to 160 L shoulder pain free elevation to allow for proper joint functioning as indicated by patients Functional Deficits. 3. Patient will demonstrate an increase in Strength to at least 4+/5 throughotu to allow for proper functional mobility as indicated by patients Functional Deficits. 4. Patient will return to functional activities including reaching and dressing without increased symptoms or restriction. 5. Patient will return to work full duty without increased symptoms or restriction     Progression Towards Functional goals:  X Patient is progressing as expected towards functional goals listed.     ? Progression is slowed due to complexities listed. ? Progression has been slowed due to co-morbidities. ? Plan just implemented, too soon to assess goals progression  ? Other:     Persisting Functional Limitations/Impairments:  ?Sitting ? Standing   ? Walking ? Squatting/bending    ? Stairs X - ADL's     Transfers X - Reaching  X - Housework X - Job related tasks   - Driving X - Sports/Recreation   X - Other: sleeping    ASSESSMENT:      Treatment/Activity Tolerance:  X -  Patient tolerated treatment well ? Patient limited by fatique  ? Patient limited by pain  ? Patient limited by other medical complications  X ?  - Other: Pt. Tolerated therapy today without complaints. Pt. Demonstrates improved shoulder passive and active mobility. Pt. With decreased shrug sign with wall walk and shoulder elevation. Able to increase resistance with band IR/ER but required cueing to decrease compensations when fatigued. Initiated wall push up with cueing for symmetrical movement bilaterally. Pt. Requires continued progression of skilled therapy in order to restore UE functional strength and mobility for eventual return to work full duty. Prognosis: X -  Good ? Fair  ? Poor    Patient Requires Follow-up: X -  Yes  ? No    Return to Play:    X -   N/A   ? Stage 1: Intro to Strength   ? Stage 2: Dynamic Strength and Intro to Plyometrics   ? Stage 3: Advanced Plyometrics and Intro to Throwing   ? Stage 4: Sport specific Training/Return to Sport   ? Ready to Return to Play, Meets All Above Stages   ? Not Ready for Return to Sports   Comments:      PLAN: 1-2x/wk  X - Continue per plan of care ? Alter current plan (see comments)  ? Plan of care initiated ? Hold pending MD visit ?  Discharge    Electronically signed by: Jenelle Farrell PT

## 2019-05-01 ENCOUNTER — HOSPITAL ENCOUNTER (OUTPATIENT)
Dept: PHYSICAL THERAPY | Age: 39
Setting detail: THERAPIES SERIES
Discharge: HOME | End: 2019-05-01
Payer: COMMERCIAL

## 2019-05-01 PROCEDURE — 97110 THERAPEUTIC EXERCISES: CPT | Performed by: PHYSICAL THERAPIST

## 2019-05-01 PROCEDURE — 97140 MANUAL THERAPY 1/> REGIONS: CPT | Performed by: PHYSICAL THERAPIST

## 2019-05-01 NOTE — FLOWSHEET NOTE
[facility]      Physical Therapy Daily Treatment Note  Date:  2019    Patient Name:  Marita Garcia    :  1980  MRN: [id number]  Medical/Treatment Diagnosis Information:  Diagnosis: N79.105 (ICD-10-CM) - Tear of left glenoid labrum; s/p L shoulder anterior labral repair dos: 3/13/19  Treatment Diagnosis: R53.1 weakness; M25.512 L shoulder pain  Insurance/Certification information:  PT Insurance Information: [facility]  Physician Information:  Referring Practitioner: Dr. Marcia Newton of care signed (Y/N): Y    Date of Patient follow up with Physician: 19    Functional score:      Date functional scale completed: 19         Progress Note: x  Yes    No  Next due by: Visit #20    Latex Allergy:  ?NO      ? YES  Preferred Language for Healthcare:   ?English       ? other:    Visit # Insurance Allowable Date Range   11 16 19-19     Pain level:  0/10     SUBJECTIVE:  Pt. Denies pain in L shoulder this morning. Pt. Notes that he continues to have occasional spasms through the front of his bicep that causes discomfort. Pt. Reports compliance with HEP. Pt. Continues to have the most difficulty with reaching behind his back and with quick movements.      OBJECTIVE:    Observation:    Test measurements:     PROM AROM     L R L R   Shoulder Flexion  153    Shoulder Abduction  135 NT 87   Shoulder External Rotation  67 NT NT NT   Shoulder Internal Rotation  ~20 NT ~L4      Strength not formally tested at this time      RESTRICTIONS/PRECAUTIONS: s/p L shoulder anterior labral repair dos: 3/13/19    Exercises/Interventions:   Therapeutic Exercise / Therapeutic Activities  Start time: 7:30     End time: 7:56 Resistance / level   Sets/sec Reps Notes   gripping    dc   Table slides flex 10\" 10    ER ranger @ 70 10\" 10 ^   pulleys Flex, scaption 10\" 10  added scaption   IR towel  10\" 10 Added    Wall climbs  10\" 10 Added  biceps 5# 3 10 ^5/1   shrugs 5# 3 10 ^5/1   Scapular retraction blue 3 10 ^5/1          IR band blue 3 10 ^5/1   ER band blue 3 10 ^5/1   SL ER 4# 3 10 ^4/24   Triceps band Blue  ^NPV 3 10 Start 4/3   Serratus punches 4# 3 10 ^4/24   Push-up plus wall 3 10 Start 4/26   Prone shoulder extension 0# 3 10 Start 5/1   Prone horizontal abduction 0# 3 10 Start 5/1          Neuromuscular Re-ed / Therapeutic Activities  Start time: 8:03     End time: 8:08       Rhythmic stabilization Supine, 90 shd flex 30\" 3 Start 4/17, gentle manual pressue   Supine alphabet 4# 2x  Start 4/24                                      Manual Intervention  Start time: 8:10      End time: 8:19        Shld /GH Mobs       Post Cap mobs       Thoracic/Rib manipualtion       CT MT/Mobs       PROM MT  flexion, abduction, gentle rotation 9'  Humeral head stabilization with rotation            Pt. Education:  -reviewed HEP      Therapeutic Exercise and NMR EXR  X [phone]) Provided verbal/tactile cueing for activities related to strengthening, flexibility, endurance, ROM  for improvements in scapular, scapulothoracic and UE control with self care, reaching, carrying, lifting, house/yardwork, driving/computer work. ? (21678) Provided verbal/tactile cueing for activities related to improving balance, coordination, kinesthetic sense, posture, motor skill, proprioception  to assist with  scapular, scapulothoracic and UE control with self care, reaching, carrying, lifting, house/yardwork, driving/computer work. Therapeutic Activities:    X (26492 or 18686) Provided verbal/tactile cueing for activities related to improving balance, coordination, kinesthetic sense, posture, motor skill, proprioception and motor activation to allow for proper function of scapular, scapulothoracic and UE control with self care, carrying, lifting, driving/computer work.      Home Exercise Program:    X (51609) Reviewed/Progressed HEP activities related to restore UE functional strength and mobility for eventual return to work full duty. Prognosis: X -  Good ? Fair  ? Poor    Patient Requires Follow-up: X -  Yes  ? No    Return to Play:    X -   N/A   ? Stage 1: Intro to Strength   ? Stage 2: Dynamic Strength and Intro to Plyometrics   ? Stage 3: Advanced Plyometrics and Intro to Throwing   ? Stage 4: Sport specific Training/Return to Sport   ? Ready to Return to Play, Meets All Above Stages   ? Not Ready for Return to Sports   Comments:      PLAN: 1-2x/wk  X - Continue per plan of care ? Alter current plan (see comments)  ? Plan of care initiated ? Hold pending MD visit ?  Discharge    Electronically signed by: Margarito Greenwood PT

## 2019-05-03 ENCOUNTER — HOSPITAL ENCOUNTER (OUTPATIENT)
Dept: PHYSICAL THERAPY | Age: 39
Setting detail: THERAPIES SERIES
Discharge: HOME | End: 2019-05-03
Payer: COMMERCIAL

## 2019-05-03 PROCEDURE — 97110 THERAPEUTIC EXERCISES: CPT

## 2019-05-03 PROCEDURE — 97140 MANUAL THERAPY 1/> REGIONS: CPT

## 2019-05-03 NOTE — FLOWSHEET NOTE
[address] [facility]      Physical Therapy Daily Treatment Note  Date:  5/3/2019    Patient Name:  Terese Bosworth    :  1980  MRN: [id number]  Medical/Treatment Diagnosis Information:  Diagnosis: H60.987 (ICD-10-CM) - Tear of left glenoid labrum; s/p L shoulder anterior labral repair dos: 3/13/19  Treatment Diagnosis: R53.1 weakness; M25.512 L shoulder pain  Insurance/Certification information:  PT Insurance Information: [facility]  Physician Information:  Referring Practitioner: Dr. Tari Worley of care signed (Y/N): Y    Date of Patient follow up with Physician: 19    Functional score:      Date functional scale completed: 19         Progress Note: x  Yes    No  Next due by: Visit #20    Latex Allergy:  ?NO      ? YES  Preferred Language for Healthcare:   ?English       ? other:    Visit # Insurance Allowable Date Range   11 16 19-19     Pain level:  0/10     SUBJECTIVE:   Pt continues to deny pain in the L shoulder. Pt is now 7+ weeks post op. Pt states that he mainly just feels stiffness with abduction or reaching behind his back.     OBJECTIVE: 5/3   Observation:    Test measurements:     PROM (NT this date) AROM     L R L R   Shoulder Flexion  153    Shoulder Abduction  135 NT 98   Shoulder External Rotation  67 NT 65 NT   Shoulder Internal Rotation  ~20 NT ~L1      Strength not formally tested at this time      RESTRICTIONS/PRECAUTIONS: s/p L shoulder anterior labral repair dos: 3/13/19    Exercises/Interventions:   Therapeutic Exercise / Therapeutic Activities  Start time: 6:57    End time: 7:30 Resistance / level   Sets/sec Reps Notes   gripping    dc   Table slides flex 10\" 10    ER ranger @ 70 10\" 10 ^ Held   pulleys Flex, scaption 10\" 10  added scaption   IR towel  10\" 10 Added    Wall climbs  10\" 10 Added           biceps 5# 3 10 ^   shrugs 5# 3 10 ^ Held   Scapular retraction blue 3 10 ^ IR band blue 3 10 ^5/1   ER band blue 3 10 ^5/1   SL ER 4# 3 10 ^4/24   Triceps band Light gray 3 10 Start 4/3 ^ 5/3   Serratus punches 5# 3 10 ^4/24 ^ 5/3   Push-up plus wall 3 10 Start 4/26   Prone shoulder extension 1# 3 10 Start 5/1 ^ 5/3   Prone horizontal abduction 0# 3 10 Start 5/1          Neuromuscular Re-ed / Therapeutic Activities  Start time: 7:30    End time: 7:36       Rhythmic stabilization Supine, 90 shd flex 30\" 3 Start 4/17, gentle manual pressue   Supine alphabet 4# 2x  Start 4/24                                      Manual Intervention  Start time: 7:36      End time: 7:46        Shld /GH Mobs       Post Cap mobs       Thoracic/Rib manipualtion       CT MT/Mobs       PROM MT  flexion, abduction, gentle rotation 10'  Humeral head stabilization with rotation            Pt. Education:  -reviewed HEP      Therapeutic Exercise and NMR EXR  X [phone]) Provided verbal/tactile cueing for activities related to strengthening, flexibility, endurance, ROM  for improvements in scapular, scapulothoracic and UE control with self care, reaching, carrying, lifting, house/yardwork, driving/computer work. ? (93651) Provided verbal/tactile cueing for activities related to improving balance, coordination, kinesthetic sense, posture, motor skill, proprioception  to assist with  scapular, scapulothoracic and UE control with self care, reaching, carrying, lifting, house/yardwork, driving/computer work. Therapeutic Activities:    X (73261 or 47722) Provided verbal/tactile cueing for activities related to improving balance, coordination, kinesthetic sense, posture, motor skill, proprioception and motor activation to allow for proper function of scapular, scapulothoracic and UE control with self care, carrying, lifting, driving/computer work.      Home Exercise Program:    X (84080) Reviewed/Progressed HEP activities related to strengthening, flexibility, endurance, ROM of scapular, scapulothoracic and UE control with self care, reaching, carrying, lifting, house/yardwork, driving/computer work  ? (28012) Reviewed/Progressed HEP activities related to improving balance, coordination, kinesthetic sense, posture, motor skill, proprioception of scapular, scapulothoracic and UE control with self care, reaching, carrying, lifting, house/yardwork, driving/computer work      Manual Treatments:  PROM / STM / Oscillations-Mobs:  G-I, II, III, IV (PA's, Inf., Post.)  X (81313) Provided manual therapy to mobilize soft tissue/joints of cervical/CT, scapular GHJ and UE for the purpose of modulating pain, promoting relaxation,  increasing ROM, reducing/eliminating soft tissue swelling/inflammation/restriction, improving soft tissue extensibility and allowing for proper ROM for normal function with self care, reaching, carrying, lifting, house/yardwork, driving/computer work    Modalities:    Ice - declined     Total treatment time: Start time:6:57      End time: 7:46    Charges:  Timed Code Treatment Minutes: 40'   Total Treatment Minutes: 52'       ? EVAL - LOW (28119)   ? EVAL - MOD (51210)  ? EVAL - HIGH (18860)  ? RE-EVAL (89021)  X XC(99385) x 2    ? IONTO  ? NMR (31145) x     ? VASO  X ? Manual (94188) x 1  ? Other:  ? TA x       ? Mech Traction (58783)  ? ES(attended) (49599)     ? ES (un) (85549):     GOALS:  Patient stated goal: get better, return to work    Therapist goals for Patient:   Short Term Goals: To be achieved in: 2 weeks  1. Independent in HEP and progression per patient tolerance, in order to prevent re-injury. MET  2. Patient will have a decrease in pain to facilitate improvement in movement, function, and ADLs as indicated by Functional Deficits. MET  3. Patient demonstrates understanding of and compliance with precautions following surgery. MET    Long Term Goals: To be achieved in: 8-12 weeks ONGOING  1.  Disability index score of 20% or less for the UEFI or Quick DASH to assist with reaching prior level of function. 2. Patient will demonstrate increased AROM to 160 L shoulder pain free elevation to allow for proper joint functioning as indicated by patients Functional Deficits. 3. Patient will demonstrate an increase in Strength to at least 4+/5 throughotu to allow for proper functional mobility as indicated by patients Functional Deficits. 4. Patient will return to functional activities including reaching and dressing without increased symptoms or restriction. 5. Patient will return to work full duty without increased symptoms or restriction     Progression Towards Functional goals:  X Patient is progressing as expected towards functional goals listed. ? Progression is slowed due to complexities listed. ? Progression has been slowed due to co-morbidities. ? Plan just implemented, too soon to assess goals progression  ? Other:     Persisting Functional Limitations/Impairments:  ?Sitting ? Standing   ? Walking ? Squatting/bending    ? Stairs X - ADL's     Transfers X - Reaching  X - Housework X - Job related tasks   - Driving X - Sports/Recreation   X - Other: sleeping    ASSESSMENT:      Treatment/Activity Tolerance:  X -  Patient tolerated treatment well ? Patient limited by fatique  ? Patient limited by pain  ? Patient limited by other medical complications  X ?  - Other: No issues with today's program.  Pt requires intermittent cueing for scapular mechanics with standing rows and prone extension, but otherwise does a good job of maintaining form. Pt demonstrated gains in shoulder flexion and IR/behind the back mobility as noted. Strength and deficits remain and pt would continue to benefit from formal therapy following a post op protocol to address deficits. Prognosis: X -  Good ? Fair  ? Poor    Patient Requires Follow-up: X -  Yes  ? No    Return to Play:    X -   N/A   ? Stage 1: Intro to Strength   ? Stage 2: Dynamic Strength and Intro to Plyometrics   ?   Stage 3: Advanced Plyometrics and Intro to Throwing   ? Stage 4: Sport specific Training/Return to Sport   ? Ready to Return to Play, Meets All Above Stages   ? Not Ready for Return to Sports   Comments:      PLAN: 1-2x/wk  X - Continue per plan of care ? Alter current plan (see comments)  ? Plan of care initiated ? Hold pending MD visit ?  Discharge    Electronically signed by: Maria Alejandra Torres PT

## 2019-05-08 ENCOUNTER — HOSPITAL ENCOUNTER (OUTPATIENT)
Dept: PHYSICAL THERAPY | Age: 39
Setting detail: THERAPIES SERIES
Discharge: HOME | End: 2019-05-08
Payer: COMMERCIAL

## 2019-05-08 PROCEDURE — 97110 THERAPEUTIC EXERCISES: CPT | Performed by: PHYSICAL THERAPIST

## 2019-05-08 PROCEDURE — 97140 MANUAL THERAPY 1/> REGIONS: CPT | Performed by: PHYSICAL THERAPIST

## 2019-05-08 NOTE — FLOWSHEET NOTE
biceps 5# 3 10 ^5/1   shrugs 5# 3 10 ^5/1   Scapular retraction black 3 10 ^5/8          IR band Blue  ^NPV 3 10 ^5/1   ER band blue 3 10 ^5/1   SL ER 4# 3 10 ^4/24   Triceps band Light gray  ^npv kickback 3 10 ^ 5/3   Serratus punches 6# 3 10 ^5/8   Push-up plus wall 3 10 Start 4/26   Prone shoulder extension 1# 3 10 ^ 5/3   Prone horizontal abduction 0# 3 10 Start 5/1          Neuromuscular Re-ed / Therapeutic Activities  Start time: 10:05    End time: 10:08       Rhythmic stabilization Supine, 90 shd flex 30\" 3 Start 4/17, gentle manual pressue   Supine alphabet 6# 2x  ^5/8                                      Manual Intervention  Start time: 10:09      End time:10:17        Shld /GH Mobs       Post Cap mobs       Thoracic/Rib manipualtion       CT MT/Mobs       PROM MT  flexion, abduction, gentle rotation 8'  Humeral head stabilization with rotation            Pt. Education:  -reviewed HEP      Therapeutic Exercise and NMR EXR  X (85400) Provided verbal/tactile cueing for activities related to strengthening, flexibility, endurance, ROM  for improvements in scapular, scapulothoracic and UE control with self care, reaching, carrying, lifting, house/yardwork, driving/computer work. ? (61156) Provided verbal/tactile cueing for activities related to improving balance, coordination, kinesthetic sense, posture, motor skill, proprioception  to assist with  scapular, scapulothoracic and UE control with self care, reaching, carrying, lifting, house/yardwork, driving/computer work. Therapeutic Activities:    X (37041 or 85443) Provided verbal/tactile cueing for activities related to improving balance, coordination, kinesthetic sense, posture, motor skill, proprioception and motor activation to allow for proper function of scapular, scapulothoracic and UE control with self care, carrying, lifting, driving/computer work.      Home Exercise Program:    X (67488) Reviewed/Progressed HEP activities related to strengthening, flexibility, endurance, ROM of scapular, scapulothoracic and UE control with self care, reaching, carrying, lifting, house/yardwork, driving/computer work  ? (71403) Reviewed/Progressed HEP activities related to improving balance, coordination, kinesthetic sense, posture, motor skill, proprioception of scapular, scapulothoracic and UE control with self care, reaching, carrying, lifting, house/yardwork, driving/computer work      Manual Treatments:  PROM / STM / Oscillations-Mobs:  G-I, II, III, IV (PA's, Inf., Post.)  X (16368) Provided manual therapy to mobilize soft tissue/joints of cervical/CT, scapular GHJ and UE for the purpose of modulating pain, promoting relaxation,  increasing ROM, reducing/eliminating soft tissue swelling/inflammation/restriction, improving soft tissue extensibility and allowing for proper ROM for normal function with self care, reaching, carrying, lifting, house/yardwork, driving/computer work    Modalities:    Ice - declined     Total treatment time: Start time: 9:30      End time: 10:17    Charges:  Timed Code Treatment Minutes: 42   Total Treatment Minutes: 47       ? EVAL - LOW (37740)   ? EVAL - MOD (05862)  ? EVAL - HIGH (41894)  ? RE-EVAL (13699)  X PM(08878) x 2    ? IONTO  ? NMR (76991) x     ? VASO  X ? Manual (56407) x 1  ? Other:  ? TA x       ? Mech Traction (52215)  ? ES(attended) (69527)     ? ES (un) (95395):     GOALS:  Patient stated goal: get better, return to work    Therapist goals for Patient:   Short Term Goals: To be achieved in: 2 weeks  1. Independent in HEP and progression per patient tolerance, in order to prevent re-injury. MET  2. Patient will have a decrease in pain to facilitate improvement in movement, function, and ADLs as indicated by Functional Deficits. MET  3. Patient demonstrates understanding of and compliance with precautions following surgery. MET    Long Term Goals: To be achieved in: 8-12 weeks ONGOING  1.  Disability index score of 20% or less for the UEFI or Quick DASH to assist with reaching prior level of function. 2. Patient will demonstrate increased AROM to 160 L shoulder pain free elevation to allow for proper joint functioning as indicated by patients Functional Deficits. 3. Patient will demonstrate an increase in Strength to at least 4+/5 throughotu to allow for proper functional mobility as indicated by patients Functional Deficits. 4. Patient will return to functional activities including reaching and dressing without increased symptoms or restriction. 5. Patient will return to work full duty without increased symptoms or restriction     Progression Towards Functional goals:  X Patient is progressing as expected towards functional goals listed. ? Progression is slowed due to complexities listed. ? Progression has been slowed due to co-morbidities. ? Plan just implemented, too soon to assess goals progression  ? Other:     Persisting Functional Limitations/Impairments:  ?Sitting ? Standing   ? Walking ? Squatting/bending    ? Stairs X - ADL's     Transfers X - Reaching  X - Housework X - Job related tasks   - Driving X - Sports/Recreation   X - Other: sleeping    ASSESSMENT:      Treatment/Activity Tolerance:  X -  Patient tolerated treatment well ? Patient limited by fatique  ? Patient limited by pain  ? Patient limited by other medical complications  X ?  - Other: Pt. Tolerated therapy today without complaints. Pt. With some increased tenderness throughout L scapular stabilizers. Occasional cueing required for timing with exercises. Pt. Demonstrates improvement with IR towel stretch. Pt. Continues to require cueing for control and correct movement with band resistance activities, especially IR/ER. Improved scapular movement and symmetry with wall push-ups. Pt. Fatigued quickly with increased resistance with supine stability exercises. Pt. Requires cueing for correct scapular positioning with prone exercises.  Pt. Continues to demonstrate improving shoulder mobility. Pt. Requires continued progression of post-op protocol in order to restore L shoulder functional strength and stability for eventual return to work. Prognosis: X -  Good ? Fair  ? Poor    Patient Requires Follow-up: X -  Yes  ? No    Return to Play:    X -   N/A   ? Stage 1: Intro to Strength   ? Stage 2: Dynamic Strength and Intro to Plyometrics   ? Stage 3: Advanced Plyometrics and Intro to Throwing   ? Stage 4: Sport specific Training/Return to Sport   ? Ready to Return to Play, Meets All Above Stages   ? Not Ready for Return to Sports   Comments:      PLAN: 1-2x/wk  X - Continue per plan of care ? Alter current plan (see comments)  ? Plan of care initiated ? Hold pending MD visit ?  Discharge    Electronically signed by: Julián Ross PT

## 2019-05-10 ENCOUNTER — HOSPITAL ENCOUNTER (OUTPATIENT)
Dept: PHYSICAL THERAPY | Age: 39
Setting detail: THERAPIES SERIES
Discharge: HOME | End: 2019-05-10
Payer: COMMERCIAL

## 2019-05-10 PROCEDURE — 97140 MANUAL THERAPY 1/> REGIONS: CPT | Performed by: PHYSICAL THERAPIST

## 2019-05-10 PROCEDURE — 97110 THERAPEUTIC EXERCISES: CPT | Performed by: PHYSICAL THERAPIST

## 2019-05-10 NOTE — FLOWSHEET NOTE
[facility]      Physical Therapy Daily Treatment Note  Date:  5/10/2019    Patient Name:  Terese Bosworth    :  1980  MRN: [id number]  Medical/Treatment Diagnosis Information:  Diagnosis: E98.275 (ICD-10-CM) - Tear of left glenoid labrum; s/p L shoulder anterior labral repair dos: 3/13/19  Treatment Diagnosis: R53.1 weakness; M25.512 L shoulder pain  Insurance/Certification information:  PT Insurance Information: [facility]  Physician Information:  Referring Practitioner: Dr. Tari Worley of care signed (Y/N): Y    Date of Patient follow up with Physician: 19    Functional score:      Date functional scale completed: 19         Progress Note: x  Yes    No  Next due by: Visit #20    Latex Allergy:  ?NO      ? YES  Preferred Language for Healthcare:   ?English       ? other:    Visit # Insurance Allowable Date Range   13 16 19-19     Pain level:  0/10      SUBJECTIVE:   Pt. Reports that his shoulder blade is feeling much better. He does not have any pain at this time. Pt. Reports that his L arm continues to feel very weak and tire quickly.       OBJECTIVE: 5/3   Observation:    Test measurements:     PROM  AROM     L R L R   Shoulder Flexion  153    Shoulder Abduction  135 NT 98   Shoulder External Rotation  67 NT 65 NT   Shoulder Internal Rotation  ~20 NT ~L1      Strength not formally tested at this time      RESTRICTIONS/PRECAUTIONS: s/p L shoulder anterior labral repair dos: 3/13/19    Exercises/Interventions:   Therapeutic Exercise / Therapeutic Activities  Start time: 8:05    End time: 8:30 Resistance / level   Sets/sec Reps Notes   gripping    dc   Table slides flex 10\" 10    ER ranger @ 90 10\" 10 ^    pulleys Flex, scaption 10\" 10  added scaption   IR towel  10\" 10 Added    Wall climbs  10\" 10 Added           biceps 6# 3 10 ^5/10   shrugs 6# 3 10 ^5/10   Scapular retraction black 3 10 ^          IR band black 3 10 ^5/10   ER band black 3 10 ^5/10, cueing throughout for full range and to decrease compensations   SL ER 4# 3 10 ^4/24   Triceps kickback 4# 3 10 ^5/10   Serratus punches 6# 3 10 ^5/8   Push-up plus countertop 3 10 ^5/10   Prone shoulder extension 1# 3 10 ^ 5/3   Prone horizontal abduction 0# 3 10 Start 5/1          Neuromuscular Re-ed / Therapeutic Activities  Start time: 8:31    End time: 8:37       Rhythmic stabilization Supine, 90 shd flex 30\" 3 Start 4/17, gentle manual pressue   Supine alphabet 6# 2x  ^5/8   Body blade Elbow neutral  A/P, IR/ER 20\" 2x Start 5/10                               Manual Intervention  Start time: 8:38      End time: 8:47        Shld /GH Mobs       Post Cap mobs       Thoracic/Rib manipualtion       CT MT/Mobs       PROM MT  flexion, abduction, gentle rotation 9'  Humeral head stabilization with rotation            Pt. Education:  -reviewed HEP      Therapeutic Exercise and NMR EXR  X (58924) Provided verbal/tactile cueing for activities related to strengthening, flexibility, endurance, ROM  for improvements in scapular, scapulothoracic and UE control with self care, reaching, carrying, lifting, house/yardwork, driving/computer work. ? (22006) Provided verbal/tactile cueing for activities related to improving balance, coordination, kinesthetic sense, posture, motor skill, proprioception  to assist with  scapular, scapulothoracic and UE control with self care, reaching, carrying, lifting, house/yardwork, driving/computer work. Therapeutic Activities:    X (09824 or 11433) Provided verbal/tactile cueing for activities related to improving balance, coordination, kinesthetic sense, posture, motor skill, proprioception and motor activation to allow for proper function of scapular, scapulothoracic and UE control with self care, carrying, lifting, driving/computer work.      Home Exercise Program:    X (90467) Reviewed/Progressed HEP activities related to strengthening, flexibility, endurance, ROM of scapular, scapulothoracic and UE control with self care, reaching, carrying, lifting, house/yardwork, driving/computer work  ? (37237) Reviewed/Progressed HEP activities related to improving balance, coordination, kinesthetic sense, posture, motor skill, proprioception of scapular, scapulothoracic and UE control with self care, reaching, carrying, lifting, house/yardwork, driving/computer work      Manual Treatments:  PROM / STM / Oscillations-Mobs:  G-I, II, III, IV (PA's, Inf., Post.)  X ([id number]) Provided manual therapy to mobilize soft tissue/joints of cervical/CT, scapular GHJ and UE for the purpose of modulating pain, promoting relaxation,  increasing ROM, reducing/eliminating soft tissue swelling/inflammation/restriction, improving soft tissue extensibility and allowing for proper ROM for normal function with self care, reaching, carrying, lifting, house/yardwork, driving/computer work    Modalities:    Ice - declined     Total treatment time: Start time: 7:59      End time: 8:46    Charges:  Timed Code Treatment Minutes: 40   Total Treatment Minutes: 47       ? EVAL - LOW (99574)   ? EVAL - MOD (74224)  ? EVAL - HIGH (16038)  ? RE-EVAL (63085)  X YN(01697) x 2    ? IONTO  ? NMR (12968) x     ? VASO  X ? Manual (56365) x 1  ? Other:  ? TA x       ? Mech Traction (99150)  ? ES(attended) (08789)     ? ES (un) (86659):     GOALS:  Patient stated goal: get better, return to work    Therapist goals for Patient:   Short Term Goals: To be achieved in: 2 weeks  1. Independent in HEP and progression per patient tolerance, in order to prevent re-injury. MET  2. Patient will have a decrease in pain to facilitate improvement in movement, function, and ADLs as indicated by Functional Deficits. MET  3. Patient demonstrates understanding of and compliance with precautions following surgery. MET    Long Term Goals: To be achieved in: 8-12 weeks ONGOING  1.  Disability index score of 20% or less for the UEFI or Quick DASH to assist with reaching prior level of function. 2. Patient will demonstrate increased AROM to 160 L shoulder pain free elevation to allow for proper joint functioning as indicated by patients Functional Deficits. 3. Patient will demonstrate an increase in Strength to at least 4+/5 throughotu to allow for proper functional mobility as indicated by patients Functional Deficits. 4. Patient will return to functional activities including reaching and dressing without increased symptoms or restriction. 5. Patient will return to work full duty without increased symptoms or restriction     Progression Towards Functional goals:  X Patient is progressing as expected towards functional goals listed. ? Progression is slowed due to complexities listed. ? Progression has been slowed due to co-morbidities. ? Plan just implemented, too soon to assess goals progression  ? Other:     Persisting Functional Limitations/Impairments:  ?Sitting ? Standing   ? Walking ? Squatting/bending    ? Stairs X - ADL's     Transfers X - Reaching  X - Housework X - Job related tasks   - Driving X - Sports/Recreation   X - Other: sleeping    ASSESSMENT:      Treatment/Activity Tolerance:  X -  Patient tolerated treatment well ? Patient limited by fatique  ? Patient limited by pain  ? Patient limited by other medical complications  X ?  - Other: Pt. Tolerated therapy today without complaints. Pt. Demonstrates good shoulder mobility with manual PROM. Pt. Continues to require cueing for correct timing and technique with exercises, especially stretches. Able to increase resistance with IR/ER band but requires cueing when fatigued to decrease compensations and go through full range. Slight pain noted with prone horizontal abduction that subsided with tactile cueing to correct scapular positioning. Able to increase resistance with push-up plus with occasional cueing for symmetrical movement. Pt.  Fatigued quickly with addition of body blade. Pt. Will continue to benefit from skilled therapy in order to restore functional strength and stability of L shoulder for eventual return to work. Prognosis: X -  Good ? Fair  ? Poor    Patient Requires Follow-up: X -  Yes  ? No    Return to Play:    X -   N/A   ? Stage 1: Intro to Strength   ? Stage 2: Dynamic Strength and Intro to Plyometrics   ? Stage 3: Advanced Plyometrics and Intro to Throwing   ? Stage 4: Sport specific Training/Return to Sport   ? Ready to Return to Play, Meets All Above Stages   ? Not Ready for Return to Sports   Comments:      PLAN: 1-2x/wk  X - Continue per plan of care ? Alter current plan (see comments)  ? Plan of care initiated ? Hold pending MD visit ?  Discharge    Electronically signed by: Jenelle Farrell PT

## 2019-05-15 ENCOUNTER — HOSPITAL ENCOUNTER (OUTPATIENT)
Dept: PHYSICAL THERAPY | Age: 39
Setting detail: THERAPIES SERIES
Discharge: HOME | End: 2019-05-15
Payer: COMMERCIAL

## 2019-05-15 PROCEDURE — 97140 MANUAL THERAPY 1/> REGIONS: CPT

## 2019-05-15 PROCEDURE — 97110 THERAPEUTIC EXERCISES: CPT

## 2019-05-15 NOTE — FLOWSHEET NOTE
10\" 10 ^4/24    pulleys Flex, scaption 10\" 10 4/17 added scaption   IR towel  10\" 10 Added 4/24   Wall climbs  10\" 10 Added 4/24          biceps 6# 3 10 ^5/10   shrugs 6# 3 10 ^5/10   Scapular retraction black 3 10 ^5/8          IR band black 3 10 ^5/10   ER band black 3 10 ^5/10, cueing throughout for full range and to decrease compensations   SL ER 4# 3 10 ^4/24   Triceps kickback 6# 3 10 ^5/10   Serratus punches 6# 3 10 ^5/8   Push-up plus countertop 3 10 ^5/10   Prone shoulder extension 1# 3 10 ^ 5/3   Prone horizontal abduction 0# 3 10 Start 5/1          Neuromuscular Re-ed / Therapeutic Activities  X 6'  Start time: 11:14    End time: 11:16  Start time:11:22   End time: 11:26       Rhythmic stabilization Supine, 90 shd flex 30\" 3 Start 4/17, gentle manual pressue   Supine alphabet 6# 2x  ^5/8   Body blade Elbow neutral  A/P, IR/ER 20\" 2x Start 5/10                               Manual Intervention   9'  Start time:11:35      End time:11:44         Shld /GH Mobs       Post Cap mobs       PROM MT  flexion, abduction, gentle rotation 9'  Humeral head stabilization with rotation            Pt. Education:  -reviewed HEP      Therapeutic Exercise and NMR EXR  X (49573) Provided verbal/tactile cueing for activities related to strengthening, flexibility, endurance, ROM  for improvements in scapular, scapulothoracic and UE control with self care, reaching, carrying, lifting, house/yardwork, driving/computer work. ? (34992) Provided verbal/tactile cueing for activities related to improving balance, coordination, kinesthetic sense, posture, motor skill, proprioception  to assist with  scapular, scapulothoracic and UE control with self care, reaching, carrying, lifting, house/yardwork, driving/computer work.     Therapeutic Activities:    X (45663 or 67642) Provided verbal/tactile cueing for activities related to improving balance, coordination, kinesthetic sense, posture, motor skill, proprioception and motor activation to allow for proper function of scapular, scapulothoracic and UE control with self care, carrying, lifting, driving/computer work. Home Exercise Program:    X (01023) Reviewed/Progressed HEP activities related to strengthening, flexibility, endurance, ROM of scapular, scapulothoracic and UE control with self care, reaching, carrying, lifting, house/yardwork, driving/computer work  ? (59105) Reviewed/Progressed HEP activities related to improving balance, coordination, kinesthetic sense, posture, motor skill, proprioception of scapular, scapulothoracic and UE control with self care, reaching, carrying, lifting, house/yardwork, driving/computer work      Manual Treatments:  PROM / STM / Oscillations-Mobs:  G-I, II, III, IV (PA's, Inf., Post.)  X ([id number]) Provided manual therapy to mobilize soft tissue/joints of cervical/CT, scapular GHJ and UE for the purpose of modulating pain, promoting relaxation,  increasing ROM, reducing/eliminating soft tissue swelling/inflammation/restriction, improving soft tissue extensibility and allowing for proper ROM for normal function with self care, reaching, carrying, lifting, house/yardwork, driving/computer work    Modalities:    Ice - declined     Total treatment time: Start time: 11:02      End time:11:44     Charges:  Timed Code Treatment Minutes: 40   Total Treatment Minutes: 42       ? EVAL - LOW (23463)   ? EVAL - MOD (19970)  ? EVAL - HIGH (06033)  ? RE-EVAL (38322)  X QR(25472) x 2    ? IONTO  ? NMR (40663) x     ? VASO  X ? Manual (96239) x 1  ? Other:  ? TA x       ? Mech Traction (02781)  ? ES(attended) (76294)     ? ES (un) (32885):     GOALS:  Patient stated goal: get better, return to work    Therapist goals for Patient:   Short Term Goals: To be achieved in: 2 weeks  1. Independent in HEP and progression per patient tolerance, in order to prevent re-injury. MET  2.  Patient will have a decrease in pain to facilitate improvement in movement, function, and ADLs as indicated by Functional Deficits. MET  3. Patient demonstrates understanding of and compliance with precautions following surgery. MET    Long Term Goals: To be achieved in: 8-12 weeks ONGOING  1. Disability index score of 20% or less for the UEFI or Quick DASH to assist with reaching prior level of function. 2. Patient will demonstrate increased AROM to 160 L shoulder pain free elevation to allow for proper joint functioning as indicated by patients Functional Deficits. 3. Patient will demonstrate an increase in Strength to at least 4+/5 throughotu to allow for proper functional mobility as indicated by patients Functional Deficits. 4. Patient will return to functional activities including reaching and dressing without increased symptoms or restriction. 5. Patient will return to work full duty without increased symptoms or restriction     Progression Towards Functional goals:  X Patient is progressing as expected towards functional goals listed. ? Progression is slowed due to complexities listed. ? Progression has been slowed due to co-morbidities. ? Plan just implemented, too soon to assess goals progression  ? Other:     Persisting Functional Limitations/Impairments:  ?Sitting ? Standing   ? Walking ? Squatting/bending    ? Stairs X - ADL's     Transfers X - Reaching  X - Housework X - Job related tasks   - Driving X - Sports/Recreation   X - Other: sleeping    ASSESSMENT:  Pt was able to perform all exercises this date despite report of increased soreness in shoulder from fall yesterday. Discussed use of ice for soreness and to monitor symptoms. Pt continues to demonstrate good strength and motion this date with exercises and is progressing in functional mobility. Continue to progress motion, strength, endurance and function as tolerated to progress toward goals and return to work without restrictions. Treatment/Activity Tolerance:  X -  Patient tolerated treatment well ?  Patient limited by fatique  ? Patient limited by pain  ? Patient limited by other medical complications   ? Prognosis: X -  Good ? Fair  ? Poor    Patient Requires Follow-up: X -  Yes  ? No    Return to Play:    X -   N/A   ? Stage 1: Intro to Strength   ? Stage 2: Dynamic Strength and Intro to Plyometrics   ? Stage 3: Advanced Plyometrics and Intro to Throwing   ? Stage 4: Sport specific Training/Return to Sport   ? Ready to Return to Play, Meets All Above Stages   ? Not Ready for Return to Sports   Comments:      PLAN: 1-2x/wk  X - Continue per plan of care ? Alter current plan (see comments)  ? Plan of care initiated ? Hold pending MD visit ?  Discharge    Electronically signed by: Yamila Loja [id number]

## 2019-05-17 ENCOUNTER — HOSPITAL ENCOUNTER (OUTPATIENT)
Dept: PHYSICAL THERAPY | Age: 39
Setting detail: THERAPIES SERIES
Discharge: HOME | End: 2019-05-17
Payer: COMMERCIAL

## 2019-05-17 PROCEDURE — 97140 MANUAL THERAPY 1/> REGIONS: CPT | Performed by: PHYSICAL THERAPIST

## 2019-05-17 PROCEDURE — 97110 THERAPEUTIC EXERCISES: CPT | Performed by: PHYSICAL THERAPIST

## 2019-05-17 NOTE — FLOWSHEET NOTE
[facility] [facility]      Physical Therapy Daily Treatment Note  Date:  2019    Patient Name:  Deyanira Bacon    :  1980  MRN: [id number]  Medical/Treatment Diagnosis Information:  Diagnosis: K74.571 (ICD-10-CM) - Tear of left glenoid labrum; s/p L shoulder anterior labral repair dos: 3/13/19  Treatment Diagnosis: R53.1 weakness; M25.512 L shoulder pain  Insurance/Certification information:  PT Insurance Information: [facility]  Physician Information:  Referring Practitioner: Dr. Ha Fontanez of care signed (Y/N): Y    Date of Patient follow up with Physician: 19    Functional score:      Date functional scale completed: 19         Progress Note: x  Yes    No  Next due by: Visit #20    Latex Allergy:  ?NO      ? YES  Preferred Language for Healthcare:   ?English       ? other:    Visit # Insurance Allowable Date Range   15 16 19-19     Pain level:  0/10      SUBJECTIVE:  Pt. Reports that his shoulder is feeling better. He does not have any pain today. Pt. Reports compliance with HEP.      OBJECTIVE: 5/3   Observation:    Test measurements:     PROM  AROM     L R L R   Shoulder Flexion  153    Shoulder Abduction  135 NT 98   Shoulder External Rotation  67 NT 65 NT   Shoulder Internal Rotation  ~20 NT ~L1      Strength not formally tested at this time      RESTRICTIONS/PRECAUTIONS: s/p L shoulder anterior labral repair dos: 3/13/19    Exercises/Interventions:   Therapeutic Exercise / Therapeutic Activities  X 33'  Start time: 8:31    End time: 8:51  Start time: 8:57  End time: 9:10 Resistance / level   Sets/sec Reps Notes          Table slides flex 10\" 10    ^    pulleys Flex, scaption 10\" 10  added scaption   IR towel  10\" 10 Added    Wall climbs  10\" 10 Added           biceps 7# 3 10 ^   shrugs 7# 3 10 ^   Scapular retraction black 3 10 ^          IR band black 3 10 ^5/10   ER band black 3 10 ^5/10, cueing throughout for full range and to decrease compensations   SL ER 4# 3 10 ^4/24   Triceps kickback 6# 3 10 ^5/10   Serratus punches 6# 3 10 ^5/8   Push-up plus countertop 3 10 ^5/10   Prone shoulder extension 3# 3 10 ^5/17, cueing for correct scapular positioning    Prone horizontal abduction 1# 3 10 ^5/17, cueing for correct scapular positioning           Neuromuscular Re-ed / Therapeutic Activities  X 5'  Start time: 8:51    End time: 8:54  Start time: 9:12    End time: 9:14       Rhythmic stabilization Supine, 90 shd flex 30\" 3 Start 4/17, gentle manual pressue   Supine alphabet 6# 2x  ^5/8   Body blade Elbow neutral  A/P, IR/ER 20\" 2x Start 5/10                               Manual Intervention  X 8'  Start time: 8:42     End time: 8:50         Shld /GH Mobs       Post Cap mobs       PROM MT  flexion, abduction, gentle rotation 8'              Pt. Education:  -reviewed HEP  -decrease frequency with higher weight resistance activities to allow for increased muscle recovery time      Therapeutic Exercise and NMR EXR  X (92341) Provided verbal/tactile cueing for activities related to strengthening, flexibility, endurance, ROM  for improvements in scapular, scapulothoracic and UE control with self care, reaching, carrying, lifting, house/yardwork, driving/computer work. ? (67827) Provided verbal/tactile cueing for activities related to improving balance, coordination, kinesthetic sense, posture, motor skill, proprioception  to assist with  scapular, scapulothoracic and UE control with self care, reaching, carrying, lifting, house/yardwork, driving/computer work. Therapeutic Activities:    X (03044 or 80706) Provided verbal/tactile cueing for activities related to improving balance, coordination, kinesthetic sense, posture, motor skill, proprioception and motor activation to allow for proper function of scapular, scapulothoracic and UE control with self care, carrying, lifting, driving/computer work. Home Exercise Program:    X (85587) Reviewed/Progressed HEP activities related to strengthening, flexibility, endurance, ROM of scapular, scapulothoracic and UE control with self care, reaching, carrying, lifting, house/yardwork, driving/computer work  ? (36409) Reviewed/Progressed HEP activities related to improving balance, coordination, kinesthetic sense, posture, motor skill, proprioception of scapular, scapulothoracic and UE control with self care, reaching, carrying, lifting, house/yardwork, driving/computer work      Manual Treatments:  PROM / STM / Oscillations-Mobs:  G-I, II, III, IV (PA's, Inf., Post.)  X ([id number]) Provided manual therapy to mobilize soft tissue/joints of cervical/CT, scapular GHJ and UE for the purpose of modulating pain, promoting relaxation,  increasing ROM, reducing/eliminating soft tissue swelling/inflammation/restriction, improving soft tissue extensibility and allowing for proper ROM for normal function with self care, reaching, carrying, lifting, house/yardwork, driving/computer work    Modalities:    Ice - declined     Total treatment time: Start time: 8:30      End time: 9:15    Charges:  Timed Code Treatment Minutes: 43   Total Treatment Minutes: 45       ? EVAL - LOW (53363)   ? EVAL - MOD (91490)  ? EVAL - HIGH (87176)  ? RE-EVAL (54978)  X FR(30380) x 2    ? IONTO  ? NMR (60250) x     ? VASO  X ? Manual (28352) x 1  ? Other:  ? TA x       ? Mech Traction (20266)  ? ES(attended) (20249)     ? ES (un) (80170):     GOALS:  Patient stated goal: get better, return to work    Therapist goals for Patient:   Short Term Goals: To be achieved in: 2 weeks  1. Independent in HEP and progression per patient tolerance, in order to prevent re-injury. MET  2. Patient will have a decrease in pain to facilitate improvement in movement, function, and ADLs as indicated by Functional Deficits. MET  3. Patient demonstrates understanding of and compliance with precautions following surgery. MET    Long Term Goals: To be achieved in: 8-12 weeks ONGOING  1. Disability index score of 20% or less for the UEFI or Quick DASH to assist with reaching prior level of function. 2. Patient will demonstrate increased AROM to 160 L shoulder pain free elevation to allow for proper joint functioning as indicated by patients Functional Deficits. 3. Patient will demonstrate an increase in Strength to at least 4+/5 throughotu to allow for proper functional mobility as indicated by patients Functional Deficits. 4. Patient will return to functional activities including reaching and dressing without increased symptoms or restriction. 5. Patient will return to work full duty without increased symptoms or restriction     Progression Towards Functional goals:  X Patient is progressing as expected towards functional goals listed. ? Progression is slowed due to complexities listed. ? Progression has been slowed due to co-morbidities. ? Plan just implemented, too soon to assess goals progression  ? Other:     Persisting Functional Limitations/Impairments:  ?Sitting ? Standing   ? Walking ? Squatting/bending    ? Stairs X - ADL's     Transfers X - Reaching  X - Housework X - Job related tasks   - Driving X - Sports/Recreation   X - Other: sleeping    ASSESSMENT:      Treatment/Activity Tolerance:  X -  Patient tolerated treatment well ? Patient limited by fatique  ? Patient limited by pain  ? Patient limited by other medical complications  X  ? Pt. Tolerated therapy today without complaints. Pt. Demonstrates good shoulder PROM at this time. Pt. Continues to have deficits in strength persisting. Tactile cueing to maintain proper scapular positioning with prone activities. Pt. Continues to be fatigued by resisted ER. Pt. Will continue to benefit from skilled therapy in order to restore UE strength for eventual return to work. Prognosis: X -  Good ? Fair  ? Poor    Patient Requires Follow-up: X -  Yes  ?  No    Return to Play:    X -   N/A   ? Stage 1: Intro to Strength   ? Stage 2: Dynamic Strength and Intro to Plyometrics   ? Stage 3: Advanced Plyometrics and Intro to Throwing   ? Stage 4: Sport specific Training/Return to Sport   ? Ready to Return to Play, Meets All Above Stages   ? Not Ready for Return to Sports   Comments:      PLAN: 1-2x/wk  X - Continue per plan of care ? Alter current plan (see comments)  ? Plan of care initiated ? Hold pending MD visit ?  Discharge    Electronically signed by: Jenelle Farrell PT

## 2019-05-24 ENCOUNTER — TELEPHONE (OUTPATIENT)
Dept: ORTHOPEDIC SURGERY | Age: 39
End: 2019-05-24

## 2019-05-24 NOTE — TELEPHONE ENCOUNTER
Sandee pate/ [employer] called to obtain a C9 for additional PT visits for patient. She states that patient has one more visit left. Therapist would like to continue to see patient for 10 more visits through mid July.     Please call Sandee Blanchard:  [phone]

## 2019-05-30 ENCOUNTER — TELEPHONE (OUTPATIENT)
Dept: ORTHOPEDIC SURGERY | Age: 39
End: 2019-05-30

## 2019-05-30 NOTE — TELEPHONE ENCOUNTER
Ron from [facility] [facility] in regards to patients work restrictions. Patient is restricted to desk work only and the employer cannot accomodate that, it was discovered in PT notes that patient has been playing basketball. Ron is asking that patients restrictions be lifted so he can get back to work.      # [phone]

## 2019-05-30 NOTE — TELEPHONE ENCOUNTER
Can return patient back to work with no restrictions. Would call patient and let him know that given his noncompliance with restrictions (he was playing basketball), [facility] and his work wants him back to work. This may affect his outcome from surgery.

## 2019-05-31 ENCOUNTER — TELEPHONE (OUTPATIENT)
Dept: ORTHOPEDIC SURGERY | Age: 39
End: 2019-05-31

## 2019-05-31 ENCOUNTER — HOSPITAL ENCOUNTER (OUTPATIENT)
Dept: PHYSICAL THERAPY | Age: 39
Setting detail: THERAPIES SERIES
Discharge: HOME | End: 2019-05-31
Payer: COMMERCIAL

## 2019-05-31 PROCEDURE — 97110 THERAPEUTIC EXERCISES: CPT

## 2019-05-31 PROCEDURE — 97140 MANUAL THERAPY 1/> REGIONS: CPT

## 2019-05-31 PROCEDURE — 97112 NEUROMUSCULAR REEDUCATION: CPT

## 2019-05-31 NOTE — FLOWSHEET NOTE
[address] [facility]      Physical Therapy Daily Treatment Note  Date:  2019    Patient Name:  Troy Pelayo    :  1980  MRN: [id number]  Medical/Treatment Diagnosis Information:  Diagnosis: Z95.327 (ICD-10-CM) - Tear of left glenoid labrum; s/p L shoulder anterior labral repair dos: 3/13/19  Treatment Diagnosis: R53.1 weakness; M25.512 L shoulder pain  Insurance/Certification information:  PT Insurance Information: [employer]  Physician Information:  Referring Practitioner: Dr. Salinas Mccann of care signed (Y/N): Y    Date of Patient follow up with Physician: 19    Functional score:      Date functional scale completed: 19         Progress Note: x  Yes    No  Next due by: Visit #20    Latex Allergy:  ?NO      ? YES  Preferred Language for Healthcare:   ?English       ? other:    Visit # Insurance Allowable Date Range   1  16 10  16 19-7/10/19  1/23/19-19     Pain level:  0/10      SUBJECTIVE:  Pt is 11 weeks post op labrum repair as of 19. Strength and motion are progressing but feels he isn't ready to lift 30-50# overhead for return to work full duty without restrictions.      OBJECTIVE: 5/3   Observation:    Test measurements:     PROM  AROM     L R L R   Shoulder Flexion  153    Shoulder Abduction  135 NT 98   Shoulder External Rotation  67 NT 65 NT   Shoulder Internal Rotation  ~20 NT ~L1      Strength not formally tested at this time      RESTRICTIONS/PRECAUTIONS: s/p L shoulder anterior labral repair dos: 3/13/19    Exercises/Interventions:   Therapeutic Exercise / Therapeutic Activities  40'  Start time: 7:25    End time: 8:05  Start time:   End time: Resistance / level   Sets/sec Reps Notes          Table slides flex 10\" 10    ^    pulleys Flex, scaption 10\" 10  added scaption   IR towel  10\" 10 Added    Wall climbs  10\" 10 Added           biceps 8# 3 10 ^  ^   shrugs 8# 3 10 ^   ^ Scapular retraction silver 3 10 ^5/8   ^5/31          IR band silver 3 10 ^5/10  ^5/31   ER band silver 3 10 ^5/10, cueing throughout for full range and to decrease compensations   ^5/31   SL ER 4# 3 10 ^4/24   Triceps kickback 8# 3 10 ^5/10   ^5/31   Serratus punches 8# 3 10 ^5/8  ^5/31   Push-up plus countertop 3 10 ^5/10   Prone shoulder extension 4# 3 10 ^5/17, cueing for correct scapular positioning   ^5/31   Prone horizontal abduction 2# 3 10 ^5/17, cueing for correct scapular positioning   ^5/31          Neuromuscular Re-ed / Therapeutic Activities  10'  Start time:8:05     End time: 8:15  Start time:    End time:       Rhythmic stabilization Supine, 90 shd flex 30\" 3 Start 4/17, gentle manual pressue   Supine alphabet 8# 2x  ^5/8   ^5/31   Body blade Elbow neutral  A/P, IR/ER 20\" 2x Start 5/10   Ball on wall circles 1# 2 20 Added 5/31                        Manual Intervention  8'  Start time:8:17      End time: 8:25         Shld /GH Mobs       Post Cap mobs       PROM MT  flexion, abduction, gentle rotation 8'              Pt. Education:  -reviewed HEP  -decrease frequency with higher weight resistance activities to allow for increased muscle recovery time      Therapeutic Exercise and NMR EXR  X (77989) Provided verbal/tactile cueing for activities related to strengthening, flexibility, endurance, ROM  for improvements in scapular, scapulothoracic and UE control with self care, reaching, carrying, lifting, house/yardwork, driving/computer work. ? (14830) Provided verbal/tactile cueing for activities related to improving balance, coordination, kinesthetic sense, posture, motor skill, proprioception  to assist with  scapular, scapulothoracic and UE control with self care, reaching, carrying, lifting, house/yardwork, driving/computer work.     Therapeutic Activities:    X (36987 or 04629) Provided verbal/tactile cueing for activities related to improving balance, coordination, kinesthetic sense, posture, motor skill, proprioception and motor activation to allow for proper function of scapular, scapulothoracic and UE control with self care, carrying, lifting, driving/computer work. Home Exercise Program:    X (40916) Reviewed/Progressed HEP activities related to strengthening, flexibility, endurance, ROM of scapular, scapulothoracic and UE control with self care, reaching, carrying, lifting, house/yardwork, driving/computer work  ? (04236) Reviewed/Progressed HEP activities related to improving balance, coordination, kinesthetic sense, posture, motor skill, proprioception of scapular, scapulothoracic and UE control with self care, reaching, carrying, lifting, house/yardwork, driving/computer work      Manual Treatments:  PROM / STM / Oscillations-Mobs:  G-I, II, III, IV (PA's, Inf., Post.)  X ([id number]) Provided manual therapy to mobilize soft tissue/joints of cervical/CT, scapular GHJ and UE for the purpose of modulating pain, promoting relaxation,  increasing ROM, reducing/eliminating soft tissue swelling/inflammation/restriction, improving soft tissue extensibility and allowing for proper ROM for normal function with self care, reaching, carrying, lifting, house/yardwork, driving/computer work    Modalities:    Ice - declined     Total treatment time: Start time:7:35       End time: 8:40    Charges:  Timed Code Treatment Minutes: 58   Total Treatment Minutes: 65       ? EVAL - LOW (91645)   ? EVAL - MOD (45428)  ? EVAL - HIGH (88742)  ? RE-EVAL (05001)  X SH(89062) x 2    ? IONTO  X? NMR (67032) x1     ? VASO  X ? Manual (66778) x 1  ? Other:  ? TA x       ? Mech Traction (61237)  ? ES(attended) (91449)     ? ES (un) (11726):     GOALS:  Patient stated goal: get better, return to work    Therapist goals for Patient:   Short Term Goals: To be achieved in: 2 weeks  1. Independent in HEP and progression per patient tolerance, in order to prevent re-injury. MET  2.  Patient will have a decrease in pain to facilitate improvement in movement, function, and ADLs as indicated by Functional Deficits. MET  3. Patient demonstrates understanding of and compliance with precautions following surgery. MET    Long Term Goals: To be achieved in: 8-12 weeks ONGOING  1. Disability index score of 20% or less for the UEFI or Quick DASH to assist with reaching prior level of function. 2. Patient will demonstrate increased AROM to 160 L shoulder pain free elevation to allow for proper joint functioning as indicated by patients Functional Deficits. 3. Patient will demonstrate an increase in Strength to at least 4+/5 throughotu to allow for proper functional mobility as indicated by patients Functional Deficits. 4. Patient will return to functional activities including reaching and dressing without increased symptoms or restriction. 5. Patient will return to work full duty without increased symptoms or restriction     Progression Towards Functional goals:  X Patient is progressing as expected towards functional goals listed. ? Progression is slowed due to complexities listed. ? Progression has been slowed due to co-morbidities. ? Plan just implemented, too soon to assess goals progression  ? Other:     Persisting Functional Limitations/Impairments:  ?Sitting ? Standing   ? Walking ? Squatting/bending    ? Stairs X - ADL's     Transfers X - Reaching  X - Housework X - Job related tasks   - Driving X - Sports/Recreation   X - Other: sleeping    ASSESSMENT:  Upgraded exercises this date per bold on above flow sheet. Pt tolerated upgrades without report of pain but was challenged and fatigued by end of session this date. Pt still lacks full strength for safe return to work full duty due to limitations in strength with overhead lifting heavy weights at this time. Continue to upgrade exercises as tolerated to promoted increased strength and functional mobility in order to return to work full time without restrictions.      Treatment/Activity

## 2019-05-31 NOTE — TELEPHONE ENCOUNTER
Called patient. Patient stated that he was not playing basketball with his surgical arm. He was playing outside with his autistic nephew. He didn't hurt the surgical arm. When he went to PT he told them that he was playing basketball and now has pain but was mainly just worried that he didn't do anything wrong to his arm. He states he has no strength in his surgical arm to go back to work and he again stated that he did not use his left arm. He spoke with David pate/ [facility] and stated that if Dr Bekah Hui is stating that going back to work at this time may affect his outcome he can still keep him out. Advised patient to contact Ron and update her as well on the situation. Dr Bekah Hui, patient is worried about doing something to left arm if returned to full duty. Please advise.   Thank you

## 2019-06-06 ENCOUNTER — TELEPHONE (OUTPATIENT)
Dept: ORTHOPEDIC SURGERY | Age: 39
End: 2019-06-06

## 2019-06-07 ENCOUNTER — HOSPITAL ENCOUNTER (OUTPATIENT)
Dept: PHYSICAL THERAPY | Age: 39
Setting detail: THERAPIES SERIES
Discharge: HOME | End: 2019-06-07
Payer: COMMERCIAL

## 2019-06-07 NOTE — FLOWSHEET NOTE
[address] [facility]    Physical Therapy  Cancellation/No-show Note  Patient Name:  Clyde Moran  :  1980   Date:  2019  Cancelled visits to date: 0  No-shows to date: 1    For today's appointment patient:  []  Cancelled  []  Rescheduled appointment  [x]  No-show     Reason given by patient:  []  Patient ill  []  Conflicting appointment  []  No transportation    []  Conflict with work  [x]  No reason given  []  Other:     Comments:      Electronically signed by:  Nell Kussmaul, PTA

## 2019-06-10 ENCOUNTER — TELEPHONE (OUTPATIENT)
Dept: ORTHOPEDIC SURGERY | Age: 39
End: 2019-06-10

## 2019-06-12 ENCOUNTER — HOSPITAL ENCOUNTER (OUTPATIENT)
Dept: PHYSICAL THERAPY | Age: 39
Setting detail: THERAPIES SERIES
Discharge: HOME | End: 2019-06-12
Payer: COMMERCIAL

## 2019-06-12 PROCEDURE — 97140 MANUAL THERAPY 1/> REGIONS: CPT | Performed by: PHYSICAL THERAPIST

## 2019-06-12 PROCEDURE — 97110 THERAPEUTIC EXERCISES: CPT | Performed by: PHYSICAL THERAPIST

## 2019-06-12 PROCEDURE — 97112 NEUROMUSCULAR REEDUCATION: CPT | Performed by: PHYSICAL THERAPIST

## 2019-06-12 NOTE — FLOWSHEET NOTE
[facility]      Physical Therapy Daily Treatment Note  Date:  2019    Patient Name:  Mandi Marroquin    :  1980  MRN: [id number]  Medical/Treatment Diagnosis Information:  Diagnosis: Q42.374 (ICD-10-CM) - Tear of left glenoid labrum; s/p L shoulder anterior labral repair dos: 3/13/19  Treatment Diagnosis: R53.1 weakness; M25.512 L shoulder pain  Insurance/Certification information:  PT Insurance Information: [facility]  Physician Information:  Referring Practitioner: Dr. Isaac Salinas of care signed (Y/N): Y    Date of Patient follow up with Physician: 19    Functional score:      Date functional scale completed: 19         Progress Note:   Yes  X  No  Next due by: Visit #20    Latex Allergy:  ?NO      ? YES  Preferred Language for Healthcare:   ?English       ? other:    Visit # Insurance Allowable Date Range   2  16 10  16 19-7/10/19  1/23/19-19     Pain level:  0/10      SUBJECTIVE:  Reports L hand going numb several times a day. States having symptoms off and on for several weeks but has gotten more frequent recently. Denies paresthesia in arm, only reports symptoms in hand. Started back to work on this past Monday with restrictions on lifting without significant shoulder pain with restrictions in place.      OBJECTIVE: 5/3   Observation:    Test measurements:     PROM  AROM     L R L R   Shoulder Flexion  153    Shoulder Abduction  135 NT 98   Shoulder External Rotation  67 NT 65 NT   Shoulder Internal Rotation  ~20 NT ~L1      Strength not formally tested at this time      : ULTT1 + L      RESTRICTIONS/PRECAUTIONS: s/p L shoulder anterior labral repair dos: 3/13/19    Exercises/Interventions:   Therapeutic Exercise / Therapeutic Activities  28'  Start time: 1:35   End time: 1:58  Start time: 2:22  End time: 2:27 Resistance / level   Sets/sec Reps Notes          Table slides    dc   ^    pulleys Flex, scaption 10\" 10 4/17 added scaption   IR towel  10\" 10 Added 4/24   Wall climbs  10\" 10 Added 4/24          biceps 9# 3 10 ^5/17  ^5/31    shrugs 9# 3 10 ^5/17   ^5/31   Scapular retraction 6 plates 3 10 ^7/7   ^7/50 ^6/12          IR band silver 3 10 ^5/10  ^5/31   ER band silver 3 10 ^5/10, cueing throughout for full range and to decrease compensations   ^5/31   SL ER 4# 3 10 ^4/24   Triceps kickback 9# 3 10 ^5/10   ^5/31   Serratus punches 9# 3 10 ^5/8  ^5/31 ^6/12   Push-up plus countertop 3 10 ^5/10   Prone shoulder extension 5# 3 10 ^6/12 cueing for correct scapular positioning   Prone horizontal abduction 3# 3 10 ^6/12 cueing for correct scapular positioning    IR/ER 90/90 position orange 2 10 Start 6/12, manual assist to maintain correct elbow position                        Neuromuscular Re-ed / Therapeutic Activities  10'  Start time: 2:00   End time: 2:10  Start time:    End time:       Rhythmic stabilization Supine, 90 shd flex 30\" 3 Start 4/17, gentle manual pressue   Supine alphabet 9# 2x  ^5/8   ^5/31 ^6/12   Body blade - small black Elbow neutral  A/P, IR/ER 20\" 2x Start 5/10   Ball on wall circles 1# 2 20 Added 5/31   Spider walks npv                    Manual Intervention  9'  Start time: 2:12      End time: 2:21          Shld /GH Mobs       Post Cap mobs       PROM MT  flexion, abduction, gentle rotation 9'              Pt. Education:  -reviewed HEP      Therapeutic Exercise and NMR EXR  X (55708) Provided verbal/tactile cueing for activities related to strengthening, flexibility, endurance, ROM  for improvements in scapular, scapulothoracic and UE control with self care, reaching, carrying, lifting, house/yardwork, driving/computer work.     ? (37431) Provided verbal/tactile cueing for activities related to improving balance, coordination, kinesthetic sense, posture, motor skill, proprioception  to assist with  scapular, scapulothoracic and UE control with self care, reaching, carrying, lifting, house/yardwork, driving/computer work. Therapeutic Activities:    X (71907 or 50551) Provided verbal/tactile cueing for activities related to improving balance, coordination, kinesthetic sense, posture, motor skill, proprioception and motor activation to allow for proper function of scapular, scapulothoracic and UE control with self care, carrying, lifting, driving/computer work. Home Exercise Program:    X (33420) Reviewed/Progressed HEP activities related to strengthening, flexibility, endurance, ROM of scapular, scapulothoracic and UE control with self care, reaching, carrying, lifting, house/yardwork, driving/computer work  ? (60636) Reviewed/Progressed HEP activities related to improving balance, coordination, kinesthetic sense, posture, motor skill, proprioception of scapular, scapulothoracic and UE control with self care, reaching, carrying, lifting, house/yardwork, driving/computer work      Manual Treatments:  PROM / STM / Oscillations-Mobs:  G-I, II, III, IV (PA's, Inf., Post.)  X ([id number]) Provided manual therapy to mobilize soft tissue/joints of cervical/CT, scapular GHJ and UE for the purpose of modulating pain, promoting relaxation,  increasing ROM, reducing/eliminating soft tissue swelling/inflammation/restriction, improving soft tissue extensibility and allowing for proper ROM for normal function with self care, reaching, carrying, lifting, house/yardwork, driving/computer work    Modalities:    Ice - declined     Total treatment time: Start time:1:30       End time: 2:27    Charges:  Timed Code Treatment Minutes: 47   Total Treatment Minutes: 57       ? EVAL - LOW (22432)   ? EVAL - MOD (68159)  ? EVAL - HIGH (74228)  ? RE-EVAL (85324)  X XY(15785) x 1    ? IONTO  X? NMR (46893) x1     ? VASO  X ? Manual (99321) x 1  ? Other:  ? TA x       ? Mech Traction (93060)  ? ES(attended) (07843)     ?  ES (un) (47482):     GOALS:  Patient stated goal: get better, return to work    Therapist goals for Patient:   Short Term Goals: To be achieved in: 2 weeks  1. Independent in HEP and progression per patient tolerance, in order to prevent re-injury. MET  2. Patient will have a decrease in pain to facilitate improvement in movement, function, and ADLs as indicated by Functional Deficits. MET  3. Patient demonstrates understanding of and compliance with precautions following surgery. MET    Long Term Goals: To be achieved in: 8-12 weeks ONGOING  1. Disability index score of 20% or less for the UEFI or Quick DASH to assist with reaching prior level of function. 2. Patient will demonstrate increased AROM to 160 L shoulder pain free elevation to allow for proper joint functioning as indicated by patients Functional Deficits. 3. Patient will demonstrate an increase in Strength to at least 4+/5 throughotu to allow for proper functional mobility as indicated by patients Functional Deficits. 4. Patient will return to functional activities including reaching and dressing without increased symptoms or restriction. 5. Patient will return to work full duty without increased symptoms or restriction     Progression Towards Functional goals:  X Patient is progressing as expected towards functional goals listed. ? Progression is slowed due to complexities listed. ? Progression has been slowed due to co-morbidities. ? Plan just implemented, too soon to assess goals progression  ? Other:     Persisting Functional Limitations/Impairments:  ?Sitting ? Standing   ? Walking ? Squatting/bending    ? Stairs  ADL's     Transfers  Reaching  X - Housework X - Job related tasks   - Driving X - Sports/Recreation    Other: sleeping    ASSESSMENT:      Treatment/Activity Tolerance:  X -  Patient tolerated treatment well ? Patient limited by fatique  ? Patient limited by pain  ? Patient limited by other medical complications  X  ? Other: Pt. tolerated therapy today with minimal complaints. Pt. With positive neural tension tests.  Will continue to monitor L hand numbness. Pt. Demonstrates good shoulder mobility. Able to increase resistance activities with pt. Noting fatigue and occasional cueing required to decrease compensations when fatigued. Initiated IR/ER at 90/90 position with pt. Requiring tactile cueing to maintain correct elbow position. Pt. Requires continued progression of skilled therapy in order to improve shoulder strength and endurance for eventual return to full duty at work. Plan for isokinetic testing NPV. Prognosis: X -  Good ? Fair  ? Poor    Patient Requires Follow-up: X -  Yes  ? No    Return to Play:    X -   N/A   ? Stage 1: Intro to Strength   ? Stage 2: Dynamic Strength and Intro to Plyometrics   ? Stage 3: Advanced Plyometrics and Intro to Throwing   ? Stage 4: Sport specific Training/Return to Sport   ? Ready to Return to Play, Meets All Above Stages   ? Not Ready for Return to Sports   Comments:      PLAN: 1-2x/wk  X - Continue per plan of care ? Alter current plan (see comments)  ? Plan of care initiated ? Hold pending MD visit ?  Discharge    Electronically signed by: Dalton Ivy [id number]    Dontae March, PT, DPT

## 2019-06-14 ENCOUNTER — HOSPITAL ENCOUNTER (OUTPATIENT)
Dept: PHYSICAL THERAPY | Age: 39
Setting detail: THERAPIES SERIES
Discharge: HOME | End: 2019-06-14
Payer: COMMERCIAL

## 2019-06-14 PROCEDURE — 97750 PHYSICAL PERFORMANCE TEST: CPT

## 2019-06-14 NOTE — FLOWSHEET NOTE
[facility]    Physical Therapy  Phone: [phone]   Fax: [phone]    Isokinetic Strength Testing Results Summary  Shoulder    Date of Test      6/14/2019    Patient:  Salomon Bower II      Medical/Treatment Diagnosis Information:  · Diagnosis: M83.241 (ICD-10-CM) - Tear of left glenoid labrum; s/p L shoulder anterior labral repair dos: 3/13/19  · Treatment Diagnosis: R53.1 weakness; M25.512 L shoulder pain  Insurance/Certification information:  PT Insurance Information: [facility]  Physician Information:  Referring Practitioner: Dr. Jessica Jauregui of care signed (Y/N): Y    Pain level: 1/10    Latex Allergy:  [x]NO      []YES  Preferred Language for Healthcare:   [x]English       []other:    Visit # Insurance Allowable Date Range   2  16 10  16 6/1/19-7/10/19  1/23/19-5/31/19       Next Progress Note due by:   Next patient visit       On 6/14/2019 the patient, Carron Cockayne, underwent an Isokinetic Strength Test to evaluate current status and progress of the strengthening phase of his current rehabilitation program.  He is currently being treated for surgical repair of L shoulder labral repair. Attached you will find a computer generated summary of the results which outlines the current status. To summarize these results you will find that Salomon Bower II underwent a test measuring the strength of the shoulder internal and external rotator muscle groups at isokinetic speeds of 60 and 180 degrees/second. Standard protocol of testing is to provide pre-test stretching and warm up of both extremities followed by instruction in the test procedure and \"practice\" repetitions prior to each actual test session. The uninjured extremity undergoes the test procedure first followed by the injured extremity. The two speeds of resistance represent the power and endurance functions of the muscle groups tested.       Subjective: Pt has been back to work this symptoms or restriction. 5. Patient will return to work full duty without increased symptoms or restriction       Charges:  Timed Code Treatment Minutes: 25   Total Treatment Minutes: 30     [] EVAL  [] LE(63993) x      [] IONTO  [] NMR (26909) x      [] VASO  [] Manual (01600) x        [] Other: Physical Performance Test (08002) x  2   [] TA x       [] Mech Traction (09300)  [] ES(attended) (23768)      [] ES (un) (90666): The findings of this test would result with the following summary and recommendations:  Pt tolerated isokinetic testing well. Deficits throughout test compared to uninvolved side and expected ranges as noted above. Pt demonstrates limited ER on L UE at this time during testing due to ROM limits and compensation during range setting. Pt also noted to have significant levels of fatigue with 180/180 endurance testing this date as noted by visual fatigue during testing and discrepancy in coefficient of variable during 180/180 testing as opposed to 60/60 resistance testing. Pt demonstrates weakness with testing this date that limit pt's ability to safely perform work duty including overhead activity as well as lifting, pushing, pulling and carrying without risk of re injury to shoulder. Pt will continue to benefit from skilled therapy to improve shoulder strength and stability for safe return to full work and daily functional mobility without restriction and without risk of re injury to shoulder. Return to Play:    []  Stage 1: Intro to Strength   []  Stage 2: Dynamic Strength and Intro to Plyometrics   []  Stage 3: Advanced Plyometrics and Intro to Throwing   []  Stage 4: Sport specific Training/Return to Sport   []  Ready to Return to Play, Meets All Above Stages   []  Not Ready for Return to Sports   Comments: Thank you for your time. Please feel free to call with any further questions.     Sincerely,  Dora Diss  PTA [id number]  6/14/2019    Anastasiya Postal, PT DPT

## 2019-06-18 ENCOUNTER — HOSPITAL ENCOUNTER (OUTPATIENT)
Dept: PHYSICAL THERAPY | Age: 39
Setting detail: THERAPIES SERIES
Discharge: HOME | End: 2019-06-18
Payer: COMMERCIAL

## 2019-06-18 PROCEDURE — 97140 MANUAL THERAPY 1/> REGIONS: CPT | Performed by: PHYSICAL THERAPIST

## 2019-06-18 PROCEDURE — 97110 THERAPEUTIC EXERCISES: CPT | Performed by: PHYSICAL THERAPIST

## 2019-06-18 PROCEDURE — 97112 NEUROMUSCULAR REEDUCATION: CPT | Performed by: PHYSICAL THERAPIST

## 2019-06-18 NOTE — PLAN OF CARE
Practitioner: Dr. Tari Worley of care signed (Y/N): Y    Date of Patient follow up with Physician: 6/25/19    Functional score:      Date functional scale completed: 4/26/19         Progress Note:  X  Yes   No  Next due by: Visit #30    Latex Allergy:  ?NO      ? YES  Preferred Language for Healthcare:   ?English       ? other:    Visit # Insurance Allowable Date Range   4  16 10  16 6/1/19-7/10/19  1/23/19-5/31/19     Pain level:  0/10 currently     SUBJECTIVE:  Pt. Denies pain in his L shoulder at this time. Pt. Notes that it was sore last night and and when trying to sleep. Pt. States that with increased activity such as at work he has some throbbing in his shoulder that lasts for about 1 hour after he is finished with the activity. Pt. Notes that the tingling in his fingers typically occurs when his elbow is bent. Pt. Reports compliance with HEP.      OBJECTIVE: 6/18       PROM  AROM     L R L R   Shoulder Flexion  162    Shoulder Abduction  160    Shoulder External Rotation  90 NT NT NT   Shoulder Internal Rotation  ~40 NT ~T8      Isokinetic Strength Test Results: 6/14/19  Bilateral Difference:Bilateral Difference:  External Rotation 60 deg/sec: 10% [x] Deficit     [] Surplus 180 deg/sec: 12.4% [x] Deficit     [] Surplus   Internal Rotation 60 deg/sec: 12.3% [x] Deficit     [] Surplus 180 deg/sec: 14.4% [x] Deficit     [] Surplus      Normative Data, 60 degrees/second:  External Rotation Normal: 15-20% Patient: 10%   Internal Rotation Normal: 25-35% Patient: 12.3%      Normative Data, 180 degrees/second:  External Rotation Normal: 15-20% Patient: 12.4%   Internal Rotation Normal: 25-35% Patient: 14.4%           RESTRICTIONS/PRECAUTIONS: s/p L shoulder anterior labral repair dos: 3/13/19    Exercises/Interventions:   Therapeutic Exercise / Therapeutic Activities  33'  Start time: 12:01   End time: 12:34  Start time:  End time:  Resistance / level   Sets/sec Reps Notes          Table slides    dc   ^4/24 pulleys Flex, scaption 10\" 10 4/17 added scaption   IR towel  10\" 10 Added 4/24   Wall climbs  10\" 10 Added 4/24          biceps 10# 3 10 ^5/17  ^5/31 ^6/18   shrugs 10# 3 10 ^5/17   ^5/31 ^6/18   Scapular retraction 7 plates 3 10 ^4/6   ^4/20 ^6/12 ^6/18          IR band silver 3 10 ^5/10  ^5/31   ER band silver 3 10 ^5/10, cueing throughout for full range and to decrease compensations   ^5/31   SL ER 4# 3 10 ^4/24   Triceps kickback 9# 3 10 ^5/10   ^5/31   Serratus punches 9# 3 10 ^5/8  ^5/31 ^6/12   Push-up plus countertop 3 10 ^5/10   Prone shoulder extension 5# 3 10 ^6/12 cueing for correct scapular positioning   Prone horizontal abduction 3# 3 10 ^6/12 cueing for correct scapular positioning    IR/ER 90/90 position orange 2 10 Start 6/12, manual assist to maintain correct elbow position                        Neuromuscular Re-ed / Therapeutic Activities  12'  Start time: 12:35    End time: 12:47  Start time:    End time:       Rhythmic stabilization Supine, 90 shd flex 30\" 3 Start 4/17, gentle manual pressue    ^5/8   ^5/31 ^6/12   Body blade - small black Elbow neutral  A/P, IR/ER 20\" 2x Start 5/10   Ball on wall circles 1# 2 20 Added 5/31   Spider walks - up/down yellow 1 10 Start 6/18                 Manual Intervention 8'    Start time: 12:48       End time: 12:56          Shld /GH Mobs       Post Cap mobs       PROM MT  flexion, abduction, rotation 8'              Pt. Education:  -reviewed HEP      Therapeutic Exercise and NMR EXR  X (73996) Provided verbal/tactile cueing for activities related to strengthening, flexibility, endurance, ROM  for improvements in scapular, scapulothoracic and UE control with self care, reaching, carrying, lifting, house/yardwork, driving/computer work.     ? (44515) Provided verbal/tactile cueing for activities related to improving balance, coordination, kinesthetic sense, posture, motor skill, proprioception  to assist with  scapular, scapulothoracic and UE control with self care, reaching, carrying, lifting, house/yardwork, driving/computer work. Therapeutic Activities:    X (79654 or 22921) Provided verbal/tactile cueing for activities related to improving balance, coordination, kinesthetic sense, posture, motor skill, proprioception and motor activation to allow for proper function of scapular, scapulothoracic and UE control with self care, carrying, lifting, driving/computer work. Home Exercise Program:    X (55980) Reviewed/Progressed HEP activities related to strengthening, flexibility, endurance, ROM of scapular, scapulothoracic and UE control with self care, reaching, carrying, lifting, house/yardwork, driving/computer work  ? (07080) Reviewed/Progressed HEP activities related to improving balance, coordination, kinesthetic sense, posture, motor skill, proprioception of scapular, scapulothoracic and UE control with self care, reaching, carrying, lifting, house/yardwork, driving/computer work      Manual Treatments:  PROM / STM / Oscillations-Mobs:  G-I, II, III, IV (PA's, Inf., Post.)  X ([id number]) Provided manual therapy to mobilize soft tissue/joints of cervical/CT, scapular GHJ and UE for the purpose of modulating pain, promoting relaxation,  increasing ROM, reducing/eliminating soft tissue swelling/inflammation/restriction, improving soft tissue extensibility and allowing for proper ROM for normal function with self care, reaching, carrying, lifting, house/yardwork, driving/computer work    Modalities:    Ice - declined     Total treatment time: Start time:12:00      End time: 12:58    Charges:  Timed Code Treatment Minutes: 53   Total Treatment Minutes: 58       ? EVAL - LOW (20084)   ? EVAL - MOD (81811)  ? EVAL - HIGH (67975)  ? RE-EVAL (28003)  X OZ(07800) x 2    ? IONTO  X? NMR (11016) x1     ? VASO  X ? Manual (39740) x 1  ? Other:  ? TA x       ? [facility] Traction (88983)  ? ES(attended) (79754)     ?  ES (un) (54905):     GOALS:  Patient stated goal: get better, return to work    Therapist goals for Patient:   Short Term Goals: To be achieved in: 2 weeks  1. Independent in HEP and progression per patient tolerance, in order to prevent re-injury. MET  2. Patient will have a decrease in pain to facilitate improvement in movement, function, and ADLs as indicated by Functional Deficits. MET  3. Patient demonstrates understanding of and compliance with precautions following surgery. MET    Long Term Goals: To be achieved in: 8-12 weeks   1. Disability index score of 20% or less for the UEFI or Quick DASH to assist with reaching prior level of function. ONGOING  2. Patient will demonstrate increased AROM to 160 L shoulder pain free elevation to allow for proper joint functioning as indicated by patients Functional Deficits. PROGRESSING  3. Patient will demonstrate an increase in Strength to at least 4+/5 throughout to allow for proper functional mobility as indicated by patients Functional Deficits. ONGOING  4. Patient will return to functional activities including reaching and dressing without increased symptoms or restriction. MET  5. Patient will return to work full duty without increased symptoms or restriction PROGRESSING    Progression Towards Functional goals:  X Patient is progressing as expected towards functional goals listed. ? Progression is slowed due to complexities listed. ? Progression has been slowed due to co-morbidities. ? Plan just implemented, too soon to assess goals progression  ? Other:     Persisting Functional Limitations/Impairments:  ?Sitting ? Standing   ? Walking ? Squatting/bending    ? Stairs  ADL's     Transfers  Reaching  X - Housework X - Job related tasks   - Driving X - Sports/Recreation    Other: sleeping    ASSESSMENT:      Treatment/Activity Tolerance:  X -  Patient tolerated treatment well ? Patient limited by fatique  ? Patient limited by pain  ? Patient limited by other medical complications  X  ? Other: Pt.  Tolerated therapy today

## 2019-06-21 ENCOUNTER — HOSPITAL ENCOUNTER (OUTPATIENT)
Dept: PHYSICAL THERAPY | Age: 39
Setting detail: THERAPIES SERIES
Discharge: HOME | End: 2019-06-21
Payer: COMMERCIAL

## 2019-06-21 PROCEDURE — 97112 NEUROMUSCULAR REEDUCATION: CPT

## 2019-06-21 PROCEDURE — 97140 MANUAL THERAPY 1/> REGIONS: CPT

## 2019-06-21 PROCEDURE — 97110 THERAPEUTIC EXERCISES: CPT

## 2019-06-21 NOTE — PLAN OF CARE
[facility]      Physical Therapy Daily Treatment Note  Date:  2019    Patient Name:  Natalia Brooks    :  1980  MRN: [id number]  Medical/Treatment Diagnosis Information:  Diagnosis: L08.377 (ICD-10-CM) - Tear of left glenoid labrum; s/p L shoulder anterior labral repair dos: 3/13/19  Treatment Diagnosis: R53.1 weakness; M25.512 L shoulder pain  Insurance/Certification information:  PT Insurance Information: [facility]  Physician Information:  Referring Practitioner: Dr. Webb Payment of care signed (Y/N): Y    Date of Patient follow up with Physician: 19    Functional score:      Date functional scale completed: 19         Progress Note:  X  Yes   No  Next due by: Visit #30    Latex Allergy:  ?NO      ? YES  Preferred Language for Healthcare:   ?English       ? other:    Visit # Insurance Allowable Date Range   5  16 10  16 19-7/10/19  1/23/19-19     Pain level:  1/10 currently     SUBJECTIVE:  Reports having posterior shoulder soreness since last visit with corresponding L side cervical muscle soreness. Soreness is better this morning than it has been but still a little tender.      OBJECTIVE:        PROM  AROM     L R L R   Shoulder Flexion  162    Shoulder Abduction  160    Shoulder External Rotation  90 NT NT NT   Shoulder Internal Rotation  ~40 NT ~T8      Isokinetic Strength Test Results: 19  Bilateral Difference:Bilateral Difference:  External Rotation 60 deg/sec: 10% [x] Deficit     [] Surplus 180 deg/sec: 12.4% [x] Deficit     [] Surplus   Internal Rotation 60 deg/sec: 12.3% [x] Deficit     [] Surplus 180 deg/sec: 14.4% [x] Deficit     [] Surplus      Normative Data, 60 degrees/second:  External Rotation Normal: 15-20% Patient: 10%   Internal Rotation Normal: 25-35% Patient: 12.3%      Normative Data, 180 degrees/second:  External Rotation Normal: 15-20% Patient: 12.4%   Internal Rotation Normal: 25-35% Patient: 14.4%         RESTRICTIONS/PRECAUTIONS: s/p L shoulder anterior labral repair dos: 3/13/19    Exercises/Interventions:   Therapeutic Exercise / Therapeutic Activities  35'  Start time: 8:25    End time: 9:00  Start time:  End time:  Resistance / level   Sets/sec Reps Notes              dc   ^4/24    pulleys Flex, scaption 10\" 10 4/17 added scaption   IR towel  10\" 10 Added 4/24   Wall climbs  10\" 10 Added 4/24          biceps 9# 3 10 ^5/17  ^5/31 ^6/18   shrugs 9# 3 10 ^5/17   ^5/31 ^6/18   Scapular retraction 7 plates 3 10 ^3/4   ^3/96 ^6/12 ^6/18          IR band silver 3 10 ^5/10  ^5/31   ER band silver 3 10 ^5/10, cueing throughout for full range and to decrease compensations   ^5/31   SL ER 4# 3 10 ^4/24   Triceps kickback 8# 3 10 ^5/10   ^5/31   Serratus punches 7# 3 10 ^5/8  ^5/31 ^6/12   Push-up plus [place] edge 3 10 ^5/10   Prone shoulder extension 4# 3 10 ^6/12 cueing for correct scapular positioning   Prone horizontal abduction 3# 3 10 ^6/12 cueing for correct scapular positioning    IR/ER 90/90 position orange 2 10 Start 6/12, manual assist to maintain correct elbow position                        Neuromuscular Re-ed / Therapeutic Activities  12'  Start time:9:00    End time:9:12  Start time:    End time:       Rhythmic stabilization Supine, 90 shd flex 30\" 3 Start 4/17, gentle manual pressue    ^5/8   ^5/31 ^6/12   Body blade - small black Elbow neutral  A/P, IR/ER 20\" 2x Start 5/10   Ball on wall circles 1# 2 20 Added 5/31   Spider walks - up/down yellow 1 10 Start 6/18                 Manual Intervention    Start time:9:12       End time: 9:20          Shld /GH Mobs       Post Cap mobs       PROM MT  flexion, abduction, rotation 8'              Pt. Education:  -reviewed HEP      Therapeutic Exercise and NMR EXR  X [phone]) Provided verbal/tactile cueing for activities related to strengthening, flexibility, endurance, ROM  for improvements in scapular, scapulothoracic and UE control with self care, reaching, carrying, lifting, house/yardwork, driving/computer work. ? (48009) Provided verbal/tactile cueing for activities related to improving balance, coordination, kinesthetic sense, posture, motor skill, proprioception  to assist with  scapular, scapulothoracic and UE control with self care, reaching, carrying, lifting, house/yardwork, driving/computer work. Therapeutic Activities:    X (01985 or 68603) Provided verbal/tactile cueing for activities related to improving balance, coordination, kinesthetic sense, posture, motor skill, proprioception and motor activation to allow for proper function of scapular, scapulothoracic and UE control with self care, carrying, lifting, driving/computer work. Home Exercise Program:    X (11024) Reviewed/Progressed HEP activities related to strengthening, flexibility, endurance, ROM of scapular, scapulothoracic and UE control with self care, reaching, carrying, lifting, house/yardwork, driving/computer work  ? (66533) Reviewed/Progressed HEP activities related to improving balance, coordination, kinesthetic sense, posture, motor skill, proprioception of scapular, scapulothoracic and UE control with self care, reaching, carrying, lifting, house/yardwork, driving/computer work      Manual Treatments:  PROM / STM / Oscillations-Mobs:  G-I, II, III, IV (PA's, Inf., Post.)  X ([id number]) Provided manual therapy to mobilize soft tissue/joints of cervical/CT, scapular GHJ and UE for the purpose of modulating pain, promoting relaxation,  increasing ROM, reducing/eliminating soft tissue swelling/inflammation/restriction, improving soft tissue extensibility and allowing for proper ROM for normal function with self care, reaching, carrying, lifting, house/yardwork, driving/computer work    Modalities:    Ice - declined     Total treatment time: Start time:8:25      End time: 9:20    Charges:  Timed Code Treatment Minutes: 55   Total Treatment Minutes: 55       ?  EVAL - LOW (40128)   ? EVAL - MOD (09258)  ? EVAL - HIGH (35029)  ? RE-EVAL (03252)  X TV(53635) x 2    ? IONTO  X? NMR (76583) x1     ? VASO  X ? Manual (28686) x 1  ? Other:  ? TA x       ? Mech Traction (22069)  ? ES(attended) (28755)     ? ES (un) (84333):     GOALS:  Patient stated goal: get better, return to work    Therapist goals for Patient:   Short Term Goals: To be achieved in: 2 weeks  1. Independent in HEP and progression per patient tolerance, in order to prevent re-injury. MET  2. Patient will have a decrease in pain to facilitate improvement in movement, function, and ADLs as indicated by Functional Deficits. MET  3. Patient demonstrates understanding of and compliance with precautions following surgery. MET    Long Term Goals: To be achieved in: 8-12 weeks   1. Disability index score of 20% or less for the UEFI or Quick DASH to assist with reaching prior level of function. ONGOING  2. Patient will demonstrate increased AROM to 160 L shoulder pain free elevation to allow for proper joint functioning as indicated by patients Functional Deficits. PROGRESSING  3. Patient will demonstrate an increase in Strength to at least 4+/5 throughout to allow for proper functional mobility as indicated by patients Functional Deficits. ONGOING  4. Patient will return to functional activities including reaching and dressing without increased symptoms or restriction. MET  5. Patient will return to work full duty without increased symptoms or restriction PROGRESSING    Progression Towards Functional goals:  X Patient is progressing as expected towards functional goals listed. ? Progression is slowed due to complexities listed. ? Progression has been slowed due to co-morbidities. ? Plan just implemented, too soon to assess goals progression  ? Other:     Persisting Functional Limitations/Impairments:  ?Sitting ? Standing   ? Walking ? Squatting/bending    ? Stairs  ADL's     Transfers  Reaching  X - Housework X - Job related tasks   - Driving X - Sports/Recreation    Other: sleeping    ASSESSMENT:  Modified weights this date for shrugs/bicep curls as well as supine serratus punches and standing tricep kickbacks due to soreness in shoulder after last visit and continued tenderness into shoulder today. \"tightness\" in L side tricep with kickbacks today and some soreness noted throughout tricep muscle. Pt demonstrates fatigue throughout session and needs cueing for rest breaks as pt tends to overcompensate with upper traps with cues needed for proper periscapular stabilization. Pt also needs tactile cues for maintaining good shoulder positioning with 90/90 IR/ER due to fatigue and compensation. Discussed taking breaks between sets instead of pushing self through full reps to maintain proper technique and due to muscle fatigue. Continue to upgrade exercises as tolerated for improved RTC and periscapular strength to promote increased shoulder stability and function and return to full work duty without restrictions. Treatment/Activity Tolerance:  X -  Patient tolerated treatment well ? Patient limited by fatique  ? Patient limited by pain  ? Patient limited by other medical complications  ? Other:    Prognosis: X -  Good ? Fair  ? Poor    Patient Requires Follow-up: X -  Yes  ? No    Return to Play:    X -   N/A   ? Stage 1: Intro to Strength   ? Stage 2: Dynamic Strength and Intro to Plyometrics   ? Stage 3: Advanced Plyometrics and Intro to Throwing   ? Stage 4: Sport specific Training/Return to Sport   ? Ready to Return to Play, Meets All Above Stages   ? Not Ready for Return to Sports   Comments:      PLAN: 1-2x/wk  X - Continue per plan of care ? Alter current plan (see comments)  ? Plan of care initiated ? Hold pending MD visit ?  Discharge    Electronically signed by: Izzy Niño [id number]

## 2019-06-25 ENCOUNTER — OFFICE VISIT (OUTPATIENT)
Dept: ORTHOPEDIC SURGERY | Age: 39
End: 2019-06-25
Payer: COMMERCIAL

## 2019-06-25 VITALS
DIASTOLIC BLOOD PRESSURE: 89 MMHG | HEART RATE: 68 BPM | RESPIRATION RATE: 17 BRPM | BODY MASS INDEX: 26.34 KG/M2 | WEIGHT: 158.07 LBS | SYSTOLIC BLOOD PRESSURE: 132 MMHG | HEIGHT: 65 IN

## 2019-06-25 DIAGNOSIS — S43.432A TEAR OF LEFT GLENOID LABRUM, INITIAL ENCOUNTER: Primary | ICD-10-CM

## 2019-06-25 PROCEDURE — 99213 OFFICE O/P EST LOW 20 MIN: CPT | Performed by: ORTHOPAEDIC SURGERY

## 2019-06-25 NOTE — PROGRESS NOTES
History:  Miah Alex II is here for follow up after left shoulder arthroscopic surgery. Surgery date was 3/1/19. Findings at surgery: ante. The patient's pain is rated at 0/10. He is doing PT at [facility]. PT notes still with some deficits during therapy and on biodex. He notes discomfort in traps and triceps. Physical Examination:  /89   Pulse 68   Resp 17   Ht 5' 5\" (1.651 m)   Wt 158 lb 1.1 oz (71.7 kg)   BMI 26.30 kg/m²       Patient is awake, alert, and in no acute distress. Sensation is intact to light touch in the axillary, median, radial, and ulnar nerve distribution bilaterally. The EPL, FPL, and interossei are grossly intact bilaterally. The Bilateral upper extremities are warm and well-perfused with brisk capillary refill. Left shoulder active forward flexion 180, ER 80, IR L4  Right shoulder active forward flexion 180, ER 80, IR L4   5/5 Supraspinatus, External rotation, Internal rotation       Assessment:   Almost 4 months status post left shoulder arthroscopy, anterior labral repair      Plan:   Continue / finish physical therapy. OTC NSAIDs prn. He can progress to carrying/pushing/pulling up to 20lbs. No overhead activity. [employer] completed. Follow up in 6 weeks to see if he can be returned to full duty.

## 2019-06-26 ENCOUNTER — HOSPITAL ENCOUNTER (OUTPATIENT)
Dept: PHYSICAL THERAPY | Age: 39
Setting detail: THERAPIES SERIES
Discharge: HOME | End: 2019-06-26
Payer: COMMERCIAL

## 2019-06-26 PROCEDURE — 97140 MANUAL THERAPY 1/> REGIONS: CPT

## 2019-06-26 PROCEDURE — 97110 THERAPEUTIC EXERCISES: CPT

## 2019-06-26 PROCEDURE — 97112 NEUROMUSCULAR REEDUCATION: CPT

## 2019-06-26 NOTE — FLOWSHEET NOTE
[address] [facility]      Physical Therapy Daily Treatment Note  Date:  2019    Patient Name:  June Montero    :  1980  MRN: [id number]  Medical/Treatment Diagnosis Information:  Diagnosis: C77.534 (ICD-10-CM) - Tear of left glenoid labrum; s/p L shoulder anterior labral repair dos: 3/13/19  Treatment Diagnosis: R53.1 weakness; M25.512 L shoulder pain  Insurance/Certification information:  PT Insurance Information: [facility]  Physician Information:  Referring Practitioner: Dr. Deacon Cerrato of care signed (Y/N): Y    Date of Patient follow up with Physician: 19    Functional score:      Date functional scale completed: 19         Progress Note:  X  Yes   No  Next due by: Visit #30    Latex Allergy:  ?NO      ? YES  Preferred Language for Healthcare:   ?English       ? other:    Visit # Insurance Allowable Date Range   6  16 10  16 19-7/10/19  1/23/19-19     Pain level:  1/10 currently     SUBJECTIVE:  Saw MD yesterday and MD wants him lifting more weight. Pt states shoulder is doing well.      OBJECTIVE:        PROM  AROM     L R L R   Shoulder Flexion  162    Shoulder Abduction  160    Shoulder External Rotation  90 NT NT NT   Shoulder Internal Rotation  ~40 NT ~T8      Isokinetic Strength Test Results: 19  Bilateral Difference:Bilateral Difference:  External Rotation 60 deg/sec: 10% [x] Deficit     [] Surplus 180 deg/sec: 12.4% [x] Deficit     [] Surplus   Internal Rotation 60 deg/sec: 12.3% [x] Deficit     [] Surplus 180 deg/sec: 14.4% [x] Deficit     [] Surplus      Normative Data, 60 degrees/second:  External Rotation Normal: 15-20% Patient: 10%   Internal Rotation Normal: 25-35% Patient: 12.3%      Normative Data, 180 degrees/second:  External Rotation Normal: 15-20% Patient: 12.4%   Internal Rotation Normal: 25-35% Patient: 14.4%         RESTRICTIONS/PRECAUTIONS: s/p L shoulder anterior labral repair dos: 3/13/19    Exercises/Interventions:   Therapeutic Exercise / Therapeutic Activities 37  Start time: 11:05    End time: 11:42  Start time:  End time:  Resistance / level   Sets/sec Reps Notes              dc   ^4/24    pulleys Flex, scaption 10\" 10 4/17 added scaption   IR towel  10\" 10 Added 4/24   Wall climbs  10\" 10 Added 4/24          Biceps - weight machine 7 plates/6 plates 2/1 10 ^8/10  ^5/31 ^6/18   ^6/26   3 10 ^5/17   ^5/31 ^6/18   Scapular retraction 7 plates 3 10 ^4/6   ^1/41 ^6/12 ^6/18          IR band - weight machine 4 plates 3 10 ^8/05  ^6/16 ^6/26   ER band - weight machine 2 plates 3 10 ^8/86, cueing throughout for full range and to decrease compensations   ^5/31  ^6/26   SL ER 5# 3 10 ^4/24  ^6/26   Triceps kickback- weight machine 7 plates 3 10 ^0/10   ^7/82  ^6/26   Serratus punches 10# 3 10 ^5/8  ^5/31 ^6/12   3 10 ^5/10   Prone shoulder extension 5# 3 10 ^6/12 cueing for correct scapular positioning    ^6/26   Prone horizontal abduction 4# 3 10 ^6/12 cueing for correct scapular positioning   ^6/26   IR/ER 90/90 position lime 2 10 Start 6/12, manual assist to maintain correct elbow position  ^6/26                        Neuromuscular Re-ed / Therapeutic Activities  15'  Start time:11:42    End time:11:57  Start time:    End time:       Rhythmic stabilization Supine, 90 shd flex 30\" 3 Start 4/17, gentle manual pressue    ^5/8   ^5/31 ^6/12   Body blade - large black Elbow neutral  A/P, IR/ER, up/down 20\" 2x Start 5/10   ^6/26   Regino Hero on wall circles 2# 2 20 Added 5/31  ^6/26   Spider walks - up/down [place] 1 10 Start 6/18   ^6/26   Supine bench press 10# 3 10 Added 6/26   Up/over hand walk on airex foam  1 15 Added 6/26 - cueing to maintain scapular form.                         Manual Intervention    Start time: 11:57       End time: 12:05          Shld /GH Mobs       Post Cap mobs       PROM MT  flexion, abduction, rotation 8'              Pt. Education:  -reviewed HEP      Therapeutic driving/computer work    Modalities:    Ice - declined     Total treatment time: Start time:11:05      End time: 12:05    Charges:  Timed Code Treatment Minutes: 60   Total Treatment Minutes: 60       ? EVAL - LOW (68216)   ? EVAL - MOD (22410)  ? EVAL - HIGH (10532)  ? RE-EVAL (28569)  X NG(68403) x 2    ? IONTO  X? NMR (50514) x1     ? VASO  X ? Manual (64714) x 1  ? Other:  ? TA x       ? Mech Traction (21418)  ? ES(attended) (44578)     ? ES (un) (20284):     GOALS:  Patient stated goal: get better, return to work    Therapist goals for Patient:   Short Term Goals: To be achieved in: 2 weeks  1. Independent in HEP and progression per patient tolerance, in order to prevent re-injury. MET  2. Patient will have a decrease in pain to facilitate improvement in movement, function, and ADLs as indicated by Functional Deficits. MET  3. Patient demonstrates understanding of and compliance with precautions following surgery. MET    Long Term Goals: To be achieved in: 8-12 weeks   1. Disability index score of 20% or less for the UEFI or Quick DASH to assist with reaching prior level of function. ONGOING  2. Patient will demonstrate increased AROM to 160 L shoulder pain free elevation to allow for proper joint functioning as indicated by patients Functional Deficits. PROGRESSING  3. Patient will demonstrate an increase in Strength to at least 4+/5 throughout to allow for proper functional mobility as indicated by patients Functional Deficits. ONGOING  4. Patient will return to functional activities including reaching and dressing without increased symptoms or restriction. MET  5. Patient will return to work full duty without increased symptoms or restriction PROGRESSING    Progression Towards Functional goals:  X Patient is progressing as expected towards functional goals listed. ? Progression is slowed due to complexities listed. ? Progression has been slowed due to co-morbidities.   ? Plan just implemented, too soon to

## 2019-06-28 ENCOUNTER — HOSPITAL ENCOUNTER (OUTPATIENT)
Dept: PHYSICAL THERAPY | Age: 39
Setting detail: THERAPIES SERIES
Discharge: HOME | End: 2019-06-28
Payer: COMMERCIAL

## 2019-06-28 PROCEDURE — 97112 NEUROMUSCULAR REEDUCATION: CPT

## 2019-06-28 PROCEDURE — 97110 THERAPEUTIC EXERCISES: CPT

## 2019-06-28 NOTE — FLOWSHEET NOTE
repair dos: 3/13/19    Exercises/Interventions:   Therapeutic Exercise / Therapeutic Activities 35  Start time: 6:55    End time: 7:30  Start time:  End time:  Resistance / level   Sets/sec Reps Notes              dc   ^4/24    pulleys Flex, scaption 10\" 10 4/17 added scaption   IR towel  10\" 10 Added 4/24   Wall climbs  10\" 10 Added 4/24          Biceps - weight machine 7 plates 3 10 ^7/58  ^0/05 ^6/18   ^6/26   3 10 ^5/17   ^5/31 ^6/18   Scapular retraction 7 plates 3 10 ^4/1   ^5/53 ^6/12 ^6/18          IR band - weight machine 4 plates 3 10 ^6/77  ^3/01 ^6/26   ER band - weight machine 2 plates 3 10 ^7/72, cueing throughout for full range and to decrease compensations   ^5/31  ^6/26   SL ER 5# 3 10 ^4/24  ^6/26   Triceps kickback- weight machine 7 plates 3 10 ^6/36   ^9/82  ^6/26   Serratus punches 12# 3 10 ^5/8  ^5/31 ^6/12   3 10 ^5/10   Prone shoulder extension 5# 3 10 ^6/12 cueing for correct scapular positioning    ^6/26   Prone horizontal abduction 4# 3 10 ^6/12 cueing for correct scapular positioning   ^6/26   IR/ER 90/90 position lime 2 10 Start 6/12, manual assist to maintain correct elbow position  ^6/26                        Neuromuscular Re-ed / Therapeutic Activities  15'  Start time:    End time:  Start time:7:30    End time:7:45       Rhythmic stabilization Supine, 90 shd flex 30\" 3 Start 4/17, gentle manual pressue    ^5/8   ^5/31 ^6/12   Body blade - large black Elbow neutral  A/P, IR/ER, up/down 20\" 2x Start 5/10   ^6/26   Larnell Kallman on wall circles 2# 2 20 Added 5/31  ^6/26   Spider walks - up/down [place] 1 10 Start 6/18   ^6/26   Supine bench press 12# 3 10 Added 6/26   Up/over hand walk on airex foam  1 15 Added 6/26 - cueing to maintain scapular form.    Modified floor pushups - on knees  1 20                  Manual Intervention    Start time:       End time:          Shld /GH Mobs       Post Cap mobs         flexion, abduction, rotation 8'              Pt. Education:  -reviewed HEP      Therapeutic Exercise and NMR EXR  X (79266) Provided verbal/tactile cueing for activities related to strengthening, flexibility, endurance, ROM  for improvements in scapular, scapulothoracic and UE control with self care, reaching, carrying, lifting, house/yardwork, driving/computer work. ? (19838) Provided verbal/tactile cueing for activities related to improving balance, coordination, kinesthetic sense, posture, motor skill, proprioception  to assist with  scapular, scapulothoracic and UE control with self care, reaching, carrying, lifting, house/yardwork, driving/computer work. Therapeutic Activities:    X (48326 or 31371) Provided verbal/tactile cueing for activities related to improving balance, coordination, kinesthetic sense, posture, motor skill, proprioception and motor activation to allow for proper function of scapular, scapulothoracic and UE control with self care, carrying, lifting, driving/computer work.      Home Exercise Program:    X (27226) Reviewed/Progressed HEP activities related to strengthening, flexibility, endurance, ROM of scapular, scapulothoracic and UE control with self care, reaching, carrying, lifting, house/yardwork, driving/computer work  ? (44392) Reviewed/Progressed HEP activities related to improving balance, coordination, kinesthetic sense, posture, motor skill, proprioception of scapular, scapulothoracic and UE control with self care, reaching, carrying, lifting, house/yardwork, driving/computer work      Manual Treatments:  PROM / STM / Oscillations-Mobs:  G-I, II, III, IV (PA's, Inf., Post.)  X ([id number]) Provided manual therapy to mobilize soft tissue/joints of cervical/CT, scapular GHJ and UE for the purpose of modulating pain, promoting relaxation,  increasing ROM, reducing/eliminating soft tissue swelling/inflammation/restriction, improving soft tissue extensibility and allowing for proper ROM for normal function with self care, reaching, carrying, lifting, too soon to assess goals progression  ? Other:     Persisting Functional Limitations/Impairments:  ?Sitting ? Standing   ? Walking ? Squatting/bending    ? Stairs  ADL's     Transfers  Reaching  X - Housework X - Job related tasks   - Driving X - Sports/Recreation    Other: sleeping    ASSESSMENT:  Cueing this date to use UE's equally this date and not overcompensate with R UE. Continued verbal cueing needed during prone walk up/over on foam and with floor pushups to maintain proper scapular position and to avoid excessive lumbar lordosis. Continue to push strength with exercises for pt to return to full PLOF and work duty without restrictions. Pt continues to be challenged by recently upgraded weights and will continue to benefit from progressions in therapy routine to focus on improving RTC strength, stability for overhead lifting without risk of re injury. Treatment/Activity Tolerance:  X -  Patient tolerated treatment well ? Patient limited by fatique  ? Patient limited by pain  ? Patient limited by other medical complications  ? Other:    Prognosis: X -  Good ? Fair  ? Poor    Patient Requires Follow-up: X -  Yes  ? No    Return to Play:    X -   N/A   ? Stage 1: Intro to Strength   ? Stage 2: Dynamic Strength and Intro to Plyometrics   ? Stage 3: Advanced Plyometrics and Intro to Throwing   ? Stage 4: Sport specific Training/Return to Sport   ? Ready to Return to Play, Meets All Above Stages   ? Not Ready for Return to Sports   Comments:      PLAN: 1-2x/wk  X - Continue per plan of care ? Alter current plan (see comments)  ? Plan of care initiated ? Hold pending MD visit ?  Discharge    Electronically signed by: Matias August [id number]

## 2019-07-02 ENCOUNTER — HOSPITAL ENCOUNTER (OUTPATIENT)
Dept: PHYSICAL THERAPY | Age: 39
Setting detail: THERAPIES SERIES
Discharge: HOME | End: 2019-07-02
Payer: COMMERCIAL

## 2019-07-02 ENCOUNTER — TELEPHONE (OUTPATIENT)
Dept: ORTHOPEDIC SURGERY | Age: 39
End: 2019-07-02

## 2019-07-02 PROCEDURE — 97150 GROUP THERAPEUTIC PROCEDURES: CPT | Performed by: PHYSICAL THERAPIST

## 2019-07-02 PROCEDURE — 97530 THERAPEUTIC ACTIVITIES: CPT | Performed by: PHYSICAL THERAPIST

## 2019-07-02 PROCEDURE — 97110 THERAPEUTIC EXERCISES: CPT | Performed by: PHYSICAL THERAPIST

## 2019-07-02 NOTE — FLOWSHEET NOTE
[facility] [facility]      Physical Therapy Daily Treatment Note  Date:  2019    Patient Name:  Kris George    :  1980  MRN: [id number]  Medical/Treatment Diagnosis Information:  Diagnosis: W96.313 (ICD-10-CM) - Tear of left glenoid labrum; s/p L shoulder anterior labral repair dos: 3/13/19  Treatment Diagnosis: R53.1 weakness; M25.512 L shoulder pain  Insurance/Certification information:  PT Insurance Information: [facility]  Physician Information:  Referring Practitioner: Dr. Erika Marquez of care signed (Y/N): Y    Date of Patient follow up with Physician: 19    Functional score:      Date functional scale completed: 19         Progress Note:  X  Yes   No  Next due by: Visit #30    Latex Allergy:  ?NO      ? YES  Preferred Language for Healthcare:   ?English       ? other:    Visit # Insurance Allowable Date Range   8  16 10  16 19-7/10/19  1/23/19-19     Pain level:  1/10 currently     SUBJECTIVE:  Pt. Reports that his shoulder is feeling okay but he is tired overall. Pt. States that his arm continues to fatigue quickly throughout the day.      OBJECTIVE:        PROM  AROM     L R L R   Shoulder Flexion  162    Shoulder Abduction  160    Shoulder External Rotation  90 NT NT NT   Shoulder Internal Rotation  ~40 NT ~T8      Isokinetic Strength Test Results: 19  Bilateral Difference:Bilateral Difference:  External Rotation 60 deg/sec: 10% [x] Deficit     [] Surplus 180 deg/sec: 12.4% [x] Deficit     [] Surplus   Internal Rotation 60 deg/sec: 12.3% [x] Deficit     [] Surplus 180 deg/sec: 14.4% [x] Deficit     [] Surplus      Normative Data, 60 degrees/second:  External Rotation Normal: 15-20% Patient: 10%   Internal Rotation Normal: 25-35% Patient: 12.3%      Normative Data, 180 degrees/second:  External Rotation Normal: 15-20% Patient: 12.4%   Internal Rotation Normal: 25-35% Patient: 14.4% maintain core muscle activation                 Manual Intervention    Start time:       End time:          Shld /GH Mobs       Post Cap mobs                    Pt. Education:  -reviewed HEP      Therapeutic Exercise and NMR EXR  X (72194) Provided verbal/tactile cueing for activities related to strengthening, flexibility, endurance, ROM  for improvements in scapular, scapulothoracic and UE control with self care, reaching, carrying, lifting, house/yardwork, driving/computer work. ? (15347) Provided verbal/tactile cueing for activities related to improving balance, coordination, kinesthetic sense, posture, motor skill, proprioception  to assist with  scapular, scapulothoracic and UE control with self care, reaching, carrying, lifting, house/yardwork, driving/computer work. Therapeutic Activities:    X (89995 or 20515) Provided verbal/tactile cueing for activities related to improving balance, coordination, kinesthetic sense, posture, motor skill, proprioception and motor activation to allow for proper function of scapular, scapulothoracic and UE control with self care, carrying, lifting, driving/computer work.      Home Exercise Program:    X (13059) Reviewed/Progressed HEP activities related to strengthening, flexibility, endurance, ROM of scapular, scapulothoracic and UE control with self care, reaching, carrying, lifting, house/yardwork, driving/computer work  ? (14502) Reviewed/Progressed HEP activities related to improving balance, coordination, kinesthetic sense, posture, motor skill, proprioception of scapular, scapulothoracic and UE control with self care, reaching, carrying, lifting, house/yardwork, driving/computer work      Manual Treatments:  PROM / STM / Oscillations-Mobs:  G-I, II, III, IV (PA's, Inf., Post.)  X ([id number]) Provided manual therapy to mobilize soft tissue/joints of cervical/CT, scapular GHJ and UE for the purpose of modulating pain, promoting relaxation,  increasing ROM,

## 2019-07-05 ENCOUNTER — HOSPITAL ENCOUNTER (OUTPATIENT)
Dept: PHYSICAL THERAPY | Age: 39
Setting detail: THERAPIES SERIES
Discharge: HOME | End: 2019-07-05
Payer: COMMERCIAL

## 2019-07-05 PROCEDURE — 97112 NEUROMUSCULAR REEDUCATION: CPT

## 2019-07-05 PROCEDURE — 97110 THERAPEUTIC EXERCISES: CPT

## 2019-07-05 NOTE — FLOWSHEET NOTE
extensibility and allowing for proper ROM for normal function with self care, reaching, carrying, lifting, house/yardwork, driving/computer work    Modalities:    Ice - declined     Total treatment time: Start time:7:00     End time: 7:51     Charges:  Timed Code Treatment Minutes: 51   Total Treatment Minutes: 51       ? EVAL - LOW (80839)   ? EVAL - MOD (29671)  ? EVAL - HIGH (61992)  ? RE-EVAL (29914)  1 IH(36293) x 2    ? IONTO  1? NMR (31446) x1     ? VASO  ? Manual (27004) x   ? Other: group  ? TA x       ? Mech Traction (93531)  ? ES(attended) (90127)     ? ES (un) (46256):     GOALS:  Patient stated goal: get better, return to work    Therapist goals for Patient:   Short Term Goals: To be achieved in: 2 weeks  1. Independent in HEP and progression per patient tolerance, in order to prevent re-injury. MET  2. Patient will have a decrease in pain to facilitate improvement in movement, function, and ADLs as indicated by Functional Deficits. MET  3. Patient demonstrates understanding of and compliance with precautions following surgery. MET    Long Term Goals: To be achieved in: 8-12 weeks   1. Disability index score of 20% or less for the UEFI or Quick DASH to assist with reaching prior level of function. ONGOING  2. Patient will demonstrate increased AROM to 160 L shoulder pain free elevation to allow for proper joint functioning as indicated by patients Functional Deficits. PROGRESSING  3. Patient will demonstrate an increase in Strength to at least 4+/5 throughout to allow for proper functional mobility as indicated by patients Functional Deficits. ONGOING  4. Patient will return to functional activities including reaching and dressing without increased symptoms or restriction. MET  5. Patient will return to work full duty without increased symptoms or restriction PROGRESSING    Progression Towards Functional goals:  X Patient is progressing as expected towards functional goals listed.     ? Progression is

## 2019-07-09 ENCOUNTER — APPOINTMENT (OUTPATIENT)
Dept: PHYSICAL THERAPY | Age: 39
End: 2019-07-09
Payer: COMMERCIAL

## 2019-07-10 ENCOUNTER — HOSPITAL ENCOUNTER (OUTPATIENT)
Dept: PHYSICAL THERAPY | Age: 39
Setting detail: THERAPIES SERIES
Discharge: HOME | End: 2019-07-10
Payer: COMMERCIAL

## 2019-07-10 PROCEDURE — 97530 THERAPEUTIC ACTIVITIES: CPT | Performed by: PHYSICAL THERAPIST

## 2019-07-10 PROCEDURE — 97110 THERAPEUTIC EXERCISES: CPT | Performed by: PHYSICAL THERAPIST

## 2019-07-10 PROCEDURE — 97150 GROUP THERAPEUTIC PROCEDURES: CPT | Performed by: PHYSICAL THERAPIST

## 2019-07-10 NOTE — FLOWSHEET NOTE
time:       End time:          Shld /GH Mobs       Post Cap mobs                    Pt. Education:  -reviewed HEP      Therapeutic Exercise and NMR EXR  X (70566) Provided verbal/tactile cueing for activities related to strengthening, flexibility, endurance, ROM  for improvements in scapular, scapulothoracic and UE control with self care, reaching, carrying, lifting, house/yardwork, driving/computer work. ? (58251) Provided verbal/tactile cueing for activities related to improving balance, coordination, kinesthetic sense, posture, motor skill, proprioception  to assist with  scapular, scapulothoracic and UE control with self care, reaching, carrying, lifting, house/yardwork, driving/computer work. Therapeutic Activities:    X (48821 or 84148) Provided verbal/tactile cueing for activities related to improving balance, coordination, kinesthetic sense, posture, motor skill, proprioception and motor activation to allow for proper function of scapular, scapulothoracic and UE control with self care, carrying, lifting, driving/computer work.      Home Exercise Program:    X (53239) Reviewed/Progressed HEP activities related to strengthening, flexibility, endurance, ROM of scapular, scapulothoracic and UE control with self care, reaching, carrying, lifting, house/yardwork, driving/computer work  ? (83164) Reviewed/Progressed HEP activities related to improving balance, coordination, kinesthetic sense, posture, motor skill, proprioception of scapular, scapulothoracic and UE control with self care, reaching, carrying, lifting, house/yardwork, driving/computer work      Manual Treatments:  PROM / STM / Oscillations-Mobs:  G-I, II, III, IV (PA's, Inf., Post.)  X ([id number]) Provided manual therapy to mobilize soft tissue/joints of cervical/CT, scapular GHJ and UE for the purpose of modulating pain, promoting relaxation,  increasing ROM, reducing/eliminating soft tissue swelling/inflammation/restriction, improving soft tissue slowed due to complexities listed. ? Progression has been slowed due to co-morbidities. ? Plan just implemented, too soon to assess goals progression  ? Other:     Persisting Functional Limitations/Impairments:  ?Sitting ? Standing   ? Walking ? Squatting/bending    ? Stairs  ADL's     Transfers  Reaching  X - Housework X - Job related tasks   - Driving X - Sports/Recreation    Other: sleeping    ASSESSMENT:      Treatment/Activity Tolerance:  X -  Patient tolerated treatment well ? Patient limited by fatique  ? Patient limited by pain  ? Patient limited by other medical complications  X ? Other: Pt. Tolerated therapy today with complaints of fatigue only. Pt. Continues to have deficits in functional strength and endurance. Pt. Fatigued quickly by addition of  walks due to deficits in overhead strength. Pt. Requires occasional cueing to maintain correct scapular positioning with strengthening activities. Pt. Requires cueing throughout therapy session for timing and control with resistance exercises. Pt. Continues to demonstrate good shoulder mobility. Pt. Will continue to benefit from skilled therapy in order to restore shoulder strength for eventual return to full duty with work. Prognosis: X -  Good ? Fair  ? Poor    Patient Requires Follow-up: X -  Yes  ? No    Return to Play:    X -   N/A   ? Stage 1: Intro to Strength   ? Stage 2: Dynamic Strength and Intro to Plyometrics   ? Stage 3: Advanced Plyometrics and Intro to Throwing   ? Stage 4: Sport specific Training/Return to Sport   ? Ready to Return to Play, Meets All Above Stages   ? Not Ready for Return to Sports   Comments:      PLAN: 1-2x/wk  X - Continue per plan of care ? Alter current plan (see comments)  ? Plan of care initiated ? Hold pending MD visit ?  Discharge    Electronically signed by: Walt Hampton PT

## 2019-07-17 ENCOUNTER — HOSPITAL ENCOUNTER (OUTPATIENT)
Dept: PHYSICAL THERAPY | Age: 39
Setting detail: THERAPIES SERIES
Discharge: HOME | End: 2019-07-17
Payer: COMMERCIAL

## 2019-07-17 PROCEDURE — 97530 THERAPEUTIC ACTIVITIES: CPT | Performed by: PHYSICAL THERAPIST

## 2019-07-17 PROCEDURE — 97110 THERAPEUTIC EXERCISES: CPT | Performed by: PHYSICAL THERAPIST

## 2019-07-17 NOTE — FLOWSHEET NOTE
[address] [facility]      Physical Therapy Daily Treatment Note  Date:  2019    Patient Name:  Elle Springer    :  1980  MRN: [id number]  Medical/Treatment Diagnosis Information:  Diagnosis: V28.267 (ICD-10-CM) - Tear of left glenoid labrum; s/p L shoulder anterior labral repair dos: 3/13/19  Treatment Diagnosis: R53.1 weakness; M25.512 L shoulder pain  Insurance/Certification information:  PT Insurance Information: [facility]  Physician Information:  Referring Practitioner: Dr. Jane Combs of care signed (Y/N): Y    Date of Patient follow up with Physician:       Progress Note:    Yes  X  No  Next due by: Visit #30    Latex Allergy:  ?NO      ? YES  Preferred Language for Healthcare:   ?English       ? other:    Visit # Insurance Allowable Date Range   1  10  16 10  10  16 19-8/15/19  6/1/19-7/10/19  1/23/19-19     Pain level:  0/10 currently     SUBJECTIVE:  Pt. Reports that his shoulder is feeling good today and he has no new complaints at this time. Pt. Reports that he has some occasional tingling in lateral upper arm and into fingers. Pt. Notes that he has not done any overhead lifting at this time.      OBJECTIVE:        PROM  AROM     L R L R   Shoulder Flexion  162    Shoulder Abduction  160    Shoulder External Rotation  90 NT NT NT   Shoulder Internal Rotation  ~40 NT ~T8      Isokinetic Strength Test Results: 19  Bilateral Difference:Bilateral Difference:  External Rotation 60 deg/sec: 10% [x] Deficit     [] Surplus 180 deg/sec: 12.4% [x] Deficit     [] Surplus   Internal Rotation 60 deg/sec: 12.3% [x] Deficit     [] Surplus 180 deg/sec: 14.4% [x] Deficit     [] Surplus      Normative Data, 60 degrees/second:  External Rotation Normal: 15-20% Patient: 10%   Internal Rotation Normal: 25-35% Patient: 12.3%      Normative Data, 180 degrees/second:  External Rotation Normal: 15-20% Patient: 12.4%   Internal Rotation Normal: 25-35% Patient: 14.4%         RESTRICTIONS/PRECAUTIONS: s/p L shoulder anterior labral repair dos: 3/13/19    Exercises/Interventions:   Therapeutic Exercise / Therapeutic Activities x25'    Start time: 7:30    End time: 7:04  Start time: 7:49   End time: 8:10 Resistance / level   Sets/sec Reps Notes              dc   ^4/24 4/17 added scaption   Added 4/24   Added 4/24   UBE  3'                   Biceps - weight machine 8 plates 1/1 15 ^8/93  ^5/31 ^6/18   ^6/26 ^7/10   3 10 ^5/17   ^5/31 ^6/18   Scapular retraction 8 plates 3 10 ^4/8   ^0/64 ^6/12 ^6/18          IR band - weight machine 5 plates 3 10 ^8/25   ER band - weight machine 3 plates 3 10 ^1/90 ^2/48  ^6/26 ^7/2 cueing to decrease compensations   SL ER 7# 3 10 ^7/17   Triceps kickback- weight machine 8 plates 3 10 ^3/92   ^3/56  ^6/26   Lat pulldown 8 plates 3 10 7/2 cueing for correct muscle activation   Serratus punches 20# 3 10 ^5/8  ^5/31 ^6/12 ^7/2 ^7/17   3 10 ^5/10   ^6/12 cueing to maintain correct scapular positioning    ^6/26   Prone horizontal abduction (B) T - 5#  Y - 2# 3  3 10  10 ^7/17   IR/ER 90/90 position orange 2 10 ^7/17 cueing to maintain elbow positioning   Seated press up  10\" 5 ^7/17                 Neuromuscular Re-ed / Therapeutic Activities  13'  Start time: 7:35  End time: 7:48   Start time:    End time:       Rhythmic stabilization seated, full shd flex 30\" 3 ^7/17    ^5/8   ^5/31 ^6/12   Body blade - large black Elbow neutral  A/P, IR/ER, up/down 20\" 2x Start 5/10   ^6/26   Ball on wall circles 4# 2 20 ^7/17   Spider walks - up/down Lime green 1 10 Start 6/18   ^6/26   Supine bench press - on table  20# dumbells 3 10 ^7/17   Up/over hand walk on airex foam  1 15 Added 6/26 - cueing to maintain scapular form and core muscle activation   Floor pushups  2 15 ^7/10 cueing for timing and control    walks 15#  kettlebell 2 laps ^7/17          Manual Intervention    Start time:       End time:          Cuca /HOOD Suárez Post Cap mobs                    Pt. Education:  -reviewed HEP      Therapeutic Exercise and NMR EXR  X (85928) Provided verbal/tactile cueing for activities related to strengthening, flexibility, endurance, ROM  for improvements in scapular, scapulothoracic and UE control with self care, reaching, carrying, lifting, house/yardwork, driving/computer work. ? (15385) Provided verbal/tactile cueing for activities related to improving balance, coordination, kinesthetic sense, posture, motor skill, proprioception  to assist with  scapular, scapulothoracic and UE control with self care, reaching, carrying, lifting, house/yardwork, driving/computer work. Therapeutic Activities:    X (41871 or 07770) Provided verbal/tactile cueing for activities related to improving balance, coordination, kinesthetic sense, posture, motor skill, proprioception and motor activation to allow for proper function of scapular, scapulothoracic and UE control with self care, carrying, lifting, driving/computer work.      Home Exercise Program:    X (80052) Reviewed/Progressed HEP activities related to strengthening, flexibility, endurance, ROM of scapular, scapulothoracic and UE control with self care, reaching, carrying, lifting, house/yardwork, driving/computer work  ? (42330) Reviewed/Progressed HEP activities related to improving balance, coordination, kinesthetic sense, posture, motor skill, proprioception of scapular, scapulothoracic and UE control with self care, reaching, carrying, lifting, house/yardwork, driving/computer work      Manual Treatments:  PROM / STM / Oscillations-Mobs:  G-I, II, III, IV (PA's, Inf., Post.)   (37789) Provided manual therapy to mobilize soft tissue/joints of cervical/CT, scapular GHJ and UE for the purpose of modulating pain, promoting relaxation,  increasing ROM, reducing/eliminating soft tissue swelling/inflammation/restriction, improving soft tissue extensibility and allowing for proper ROM for has been slowed due to co-morbidities. ? Plan just implemented, too soon to assess goals progression  ? Other:     Persisting Functional Limitations/Impairments:  ?Sitting ? Standing   ? Walking ? Squatting/bending    ? Stairs  ADL's     Transfers  Reaching  X - Housework X - Job related tasks   - Driving X - Sports/Recreation    Other: sleeping    ASSESSMENT:      Treatment/Activity Tolerance:  X -  Patient tolerated treatment well ? Patient limited by fatique  ? Patient limited by pain  ? Patient limited by other medical complications  X ? Other: Pt. Tolerated therapy today with complaints of fatigue only. Pt. Continues to be challenged by closed chain resistance activities and fatigues quickly. Pt. Able to increase resistance with  walks. Difficulty holding positioning with rhythmic stabilization with shoulder overhead. Pt. Able to increase resistance exercises demonstrating improvements in strength. Pt. Joshua Violeta by addition of prone Y but able to complete without increased pain. Pt. Required cueing with increased resistance with ER to decrease elbow extension compensation. Pt. Requires continued progression of skilled therapy in order to restore overhead shoulder strength for return to full duty at work. Prognosis: X -  Good ? Fair  ? Poor    Patient Requires Follow-up: X -  Yes  ? No    Return to Play:    X -   N/A   ? Stage 1: Intro to Strength   ? Stage 2: Dynamic Strength and Intro to Plyometrics   ? Stage 3: Advanced Plyometrics and Intro to Throwing   ? Stage 4: Sport specific Training/Return to Sport   ? Ready to Return to Play, Meets All Above Stages   ? Not Ready for Return to Sports   Comments:      PLAN: 1-2x/wk  X - Continue per plan of care ? Alter current plan (see comments)  ? Plan of care initiated ? Hold pending MD visit ?  Discharge    Electronically signed by: Adore Perez PT

## 2019-07-24 ENCOUNTER — HOSPITAL ENCOUNTER (OUTPATIENT)
Dept: PHYSICAL THERAPY | Age: 39
Setting detail: THERAPIES SERIES
Discharge: HOME | End: 2019-07-24
Payer: COMMERCIAL

## 2019-07-24 PROCEDURE — 97110 THERAPEUTIC EXERCISES: CPT | Performed by: PHYSICAL THERAPIST

## 2019-07-24 PROCEDURE — 97530 THERAPEUTIC ACTIVITIES: CPT | Performed by: PHYSICAL THERAPIST

## 2019-07-24 NOTE — FLOWSHEET NOTE
ball drop and catch 4# ball 2 10 Start 7/24 cueing to maintain positioning                 Manual Intervention    Start time:       End time:          Shld /GH Mobs       Post Cap mobs                    Pt. Education:  -reviewed HEP      Therapeutic Exercise and NMR EXR  X (56886) Provided verbal/tactile cueing for activities related to strengthening, flexibility, endurance, ROM  for improvements in scapular, scapulothoracic and UE control with self care, reaching, carrying, lifting, house/yardwork, driving/computer work. ? (40373) Provided verbal/tactile cueing for activities related to improving balance, coordination, kinesthetic sense, posture, motor skill, proprioception  to assist with  scapular, scapulothoracic and UE control with self care, reaching, carrying, lifting, house/yardwork, driving/computer work. Therapeutic Activities:    X (92504 or 41124) Provided verbal/tactile cueing for activities related to improving balance, coordination, kinesthetic sense, posture, motor skill, proprioception and motor activation to allow for proper function of scapular, scapulothoracic and UE control with self care, carrying, lifting, driving/computer work.      Home Exercise Program:    X (30720) Reviewed/Progressed HEP activities related to strengthening, flexibility, endurance, ROM of scapular, scapulothoracic and UE control with self care, reaching, carrying, lifting, house/yardwork, driving/computer work  ? (34742) Reviewed/Progressed HEP activities related to improving balance, coordination, kinesthetic sense, posture, motor skill, proprioception of scapular, scapulothoracic and UE control with self care, reaching, carrying, lifting, house/yardwork, driving/computer work      Manual Treatments:  PROM / STM / Oscillations-Mobs:  G-I, II, III, IV (PA's, Inf., Post.)   (83443) Provided manual therapy to mobilize soft tissue/joints of cervical/CT, scapular GHJ and UE for the purpose of modulating pain, promoting

## 2019-07-26 ENCOUNTER — HOSPITAL ENCOUNTER (OUTPATIENT)
Dept: PHYSICAL THERAPY | Age: 39
Setting detail: THERAPIES SERIES
Discharge: HOME | End: 2019-07-26
Payer: COMMERCIAL

## 2019-07-26 PROCEDURE — 97110 THERAPEUTIC EXERCISES: CPT | Performed by: PHYSICAL THERAPIST

## 2019-07-26 PROCEDURE — 97750 PHYSICAL PERFORMANCE TEST: CPT | Performed by: PHYSICAL THERAPIST

## 2019-07-26 PROCEDURE — 97530 THERAPEUTIC ACTIVITIES: CPT | Performed by: PHYSICAL THERAPIST

## 2019-07-31 ENCOUNTER — HOSPITAL ENCOUNTER (OUTPATIENT)
Dept: PHYSICAL THERAPY | Age: 39
Setting detail: THERAPIES SERIES
Discharge: HOME | End: 2019-07-31
Payer: COMMERCIAL

## 2019-07-31 PROCEDURE — 97110 THERAPEUTIC EXERCISES: CPT | Performed by: PHYSICAL THERAPIST

## 2019-07-31 PROCEDURE — 97530 THERAPEUTIC ACTIVITIES: CPT | Performed by: PHYSICAL THERAPIST

## 2019-07-31 NOTE — FLOWSHEET NOTE
cueing for timing and control    walks 20#  kettlebell 2 laps ^7/31   Plank tilts - BOSU  2 10 Start 7/24 for improved closed chain shoulder control   Prone 90/90 ball drop and catch 4# ball 2 10 Start 7/24 cueing to maintain positioning                 Manual Intervention    Start time:       End time:          Shld /GH Mobs       Post Cap mobs                    Pt. Education:  -reviewed HEP      Therapeutic Exercise and NMR EXR  X (28109) Provided verbal/tactile cueing for activities related to strengthening, flexibility, endurance, ROM  for improvements in scapular, scapulothoracic and UE control with self care, reaching, carrying, lifting, house/yardwork, driving/computer work. ? (99863) Provided verbal/tactile cueing for activities related to improving balance, coordination, kinesthetic sense, posture, motor skill, proprioception  to assist with  scapular, scapulothoracic and UE control with self care, reaching, carrying, lifting, house/yardwork, driving/computer work. Therapeutic Activities:    X (77763 or 04229) Provided verbal/tactile cueing for activities related to improving balance, coordination, kinesthetic sense, posture, motor skill, proprioception and motor activation to allow for proper function of scapular, scapulothoracic and UE control with self care, carrying, lifting, driving/computer work.      Home Exercise Program:    X (02187) Reviewed/Progressed HEP activities related to strengthening, flexibility, endurance, ROM of scapular, scapulothoracic and UE control with self care, reaching, carrying, lifting, house/yardwork, driving/computer work  ? (96864) Reviewed/Progressed HEP activities related to improving balance, coordination, kinesthetic sense, posture, motor skill, proprioception of scapular, scapulothoracic and UE control with self care, reaching, carrying, lifting, house/yardwork, driving/computer work      Manual Treatments:  PROM / STM / Oscillations-Mobs:  G-I, II, III, IV dressing without increased symptoms or restriction. MET  5. Patient will return to work full duty without increased symptoms or restriction PROGRESSING    Progression Towards Functional goals:  X Patient is progressing as expected towards functional goals listed. ? Progression is slowed due to complexities listed. ? Progression has been slowed due to co-morbidities. ? Plan just implemented, too soon to assess goals progression  ? Other:     Persisting Functional Limitations/Impairments:  ?Sitting ? Standing   ? Walking ? Squatting/bending    ? Stairs  ADL's     Transfers  Reaching  X - Housework X - Job related tasks   - Driving X - Sports/Recreation    Other: sleeping    ASSESSMENT:      Treatment/Activity Tolerance:  X -  Patient tolerated treatment well ? Patient limited by fatique  ? Patient limited by pain  ? Patient limited by other medical complications  X ? Other: pt. Tolerated therapy today with minimal complaints. Pt. Continues to require cueing to maintain scapular positioning. Pt. Noted slight back pain with lat pulldowns that subsided with a break and change in resistance. Able to make noted increases in resistance activities as strength continues to progress. Pt. Fatigued by closed chain exercises but able to complete without increase in pain. No reports of increased neck pain throughout therapy session. Pt. Will continue to benefit from skilled therapy in order to restore UE strength for eventual return to full duty at work. Prognosis: X -  Good ? Fair  ? Poor    Patient Requires Follow-up: X -  Yes  ? No    Return to Play:    X -   N/A   ? Stage 1: Intro to Strength   ? Stage 2: Dynamic Strength and Intro to Plyometrics   ? Stage 3: Advanced Plyometrics and Intro to Throwing   ? Stage 4: Sport specific Training/Return to Sport   ? Ready to Return to Play, Meets All Above Stages   ? Not Ready for Return to Sports   Comments:      PLAN: 1-2x/wk  X - Continue per plan of care ?  Alter current

## 2019-08-02 ENCOUNTER — HOSPITAL ENCOUNTER (OUTPATIENT)
Dept: PHYSICAL THERAPY | Age: 39
Setting detail: THERAPIES SERIES
Discharge: HOME | End: 2019-08-02
Payer: COMMERCIAL

## 2019-08-02 PROCEDURE — 97110 THERAPEUTIC EXERCISES: CPT | Performed by: PHYSICAL THERAPIST

## 2019-08-02 PROCEDURE — 97530 THERAPEUTIC ACTIVITIES: CPT | Performed by: PHYSICAL THERAPIST

## 2019-08-02 PROCEDURE — 97150 GROUP THERAPEUTIC PROCEDURES: CPT | Performed by: PHYSICAL THERAPIST

## 2019-08-02 NOTE — FLOWSHEET NOTE
restriction. MET  5. Patient will return to work full duty without increased symptoms or restriction PROGRESSING    Progression Towards Functional goals:  X Patient is progressing as expected towards functional goals listed. ? Progression is slowed due to complexities listed. ? Progression has been slowed due to co-morbidities. ? Plan just implemented, too soon to assess goals progression  ? Other:     Persisting Functional Limitations/Impairments:  ?Sitting ? Standing   ? Walking ? Squatting/bending    ? Stairs  ADL's     Transfers  Reaching  X - Housework X - Job related tasks   - Driving X - Sports/Recreation    Other: sleeping    ASSESSMENT:      Treatment/Activity Tolerance:  X -  Patient tolerated treatment well ? Patient limited by fatique  ? Patient limited by pain  ? Patient limited by other medical complications  X ? Other: Pt. Tolerated therapy today with minimal complaints. Pt. Reports being fatigued throughout therapy session. Pt. Requires occasional cueing to decrease compensations with resistance activities when fatigued. Pt. With some difficulty utilizing correct muscle groups when fatigued and required cueing due to overactivation of larger muscle groups. Initiated rainbow spider walks with tactile cueing required to maintain scapular positioning. Improved core control and scapular stabilization with plank positioned activities. Pt. Improving in overhead activities. Pt. Requires continued progression of skilled therapy in order to restore full strength for return to full duty at work. Prognosis: X -  Good ? Fair  ? Poor    Patient Requires Follow-up: X -  Yes  ? No    Return to Play:    X -   N/A   ? Stage 1: Intro to Strength   ? Stage 2: Dynamic Strength and Intro to Plyometrics   ? Stage 3: Advanced Plyometrics and Intro to Throwing   ? Stage 4: Sport specific Training/Return to Sport   ? Ready to Return to Play, Meets All Above Stages   ?   Not Ready for Return to Sports   Comments:

## 2019-08-06 ENCOUNTER — OFFICE VISIT (OUTPATIENT)
Dept: ORTHOPEDIC SURGERY | Age: 39
End: 2019-08-06
Payer: COMMERCIAL

## 2019-08-06 ENCOUNTER — TELEPHONE (OUTPATIENT)
Dept: ORTHOPEDIC SURGERY | Age: 39
End: 2019-08-06

## 2019-08-06 VITALS
HEART RATE: 87 BPM | WEIGHT: 158.07 LBS | BODY MASS INDEX: 26.34 KG/M2 | DIASTOLIC BLOOD PRESSURE: 89 MMHG | HEIGHT: 65 IN | SYSTOLIC BLOOD PRESSURE: 133 MMHG

## 2019-08-06 DIAGNOSIS — S43.432A TEAR OF LEFT GLENOID LABRUM, INITIAL ENCOUNTER: Primary | ICD-10-CM

## 2019-08-06 PROCEDURE — 99213 OFFICE O/P EST LOW 20 MIN: CPT | Performed by: ORTHOPAEDIC SURGERY

## 2019-08-07 ENCOUNTER — HOSPITAL ENCOUNTER (OUTPATIENT)
Dept: PHYSICAL THERAPY | Age: 39
Setting detail: THERAPIES SERIES
Discharge: HOME | End: 2019-08-07
Payer: COMMERCIAL

## 2019-08-07 PROCEDURE — 97110 THERAPEUTIC EXERCISES: CPT | Performed by: PHYSICAL THERAPIST

## 2019-08-07 PROCEDURE — 97530 THERAPEUTIC ACTIVITIES: CPT | Performed by: PHYSICAL THERAPIST

## 2019-08-07 NOTE — FLOWSHEET NOTE
[facility]      Physical Therapy Daily Treatment Note  Date:  2019    Patient Name:  Mack Alex    :  1980  MRN: [id number]  Medical/Treatment Diagnosis Information:  Diagnosis: D73.599 (ICD-10-CM) - Tear of left glenoid labrum; s/p L shoulder anterior labral repair dos: 3/13/19  Treatment Diagnosis: R53.1 weakness; M25.512 L shoulder pain  Insurance/Certification information:  PT Insurance Information: [id number]  Physician Information:  Referring Practitioner: Dr. Maria R Diaz of care signed (Y/N): Y    Date of Patient follow up with Physician: 19      Progress Note:    Yes  X  No  Next due by: Visit #39    Latex Allergy:  ?NO      ? YES  Preferred Language for Healthcare:   ?English       ? other:    Visit # Insurance Allowable Date Range   6  10  16 10  10  16 19-8/15/19  6/1/19-7/10/19  1/23/19-19     Pain level:  0/10 currently     SUBJECTIVE:  Pt. Reports that his shoulder is feeling okay today but overall he is very fatigued. Pt. States that he continues to get occasional spasms through his left shoulder. Pt. States that he saw referring MD yesterday who cleared him to return to work full duty with restriction of no overhead lifting.      OBJECTIVE:      slight tenderness posterior shoulder and triceps proximal muscle belly             PROM  AROM     L R L R   Shoulder Flexion  165     Shoulder Abduction  170     Shoulder External Rotation  92 NT NT NT   Shoulder Internal Rotation  ~45 NT ~T8           Isokinetic Strength Test Results: 19  Bilateral Difference:Bilateral Difference:  External Rotation 60 deg/sec: 28.6% [x] Deficit     [] Surplus 180 deg/sec: 19.9% [x] Deficit     [] Surplus   Internal Rotation 60 deg/sec: 2.9% [x] Deficit     [x] Surplus 180 deg/sec: 1.4% [] Deficit     [x] Surplus      Normative Data, 60 degrees/second:  External Rotation Normal: 15-20% Patient: 12.6%   Internal Meets All Above Stages   ? Not Ready for Return to Sports   Comments:      PLAN: 1-2x/wk  X - Continue per plan of care ? Alter current plan (see comments)  ? Plan of care initiated ? Hold pending MD visit ?  Discharge    Electronically signed by: Domi Newton PT

## 2019-08-09 ENCOUNTER — HOSPITAL ENCOUNTER (OUTPATIENT)
Dept: PHYSICAL THERAPY | Age: 39
Setting detail: THERAPIES SERIES
Discharge: HOME | End: 2019-08-09
Payer: COMMERCIAL

## 2019-08-09 NOTE — FLOWSHEET NOTE
[address] [facility]    Physical Therapy  Cancellation/No-show Note  Patient Name:  Rj Prajapati  :  1980   Date:  2019  Cancelled visits to date: 0  No-shows to date: 2    For today's appointment patient:  []  Cancelled  []  Rescheduled appointment  [x]  No-show     Reason given by patient:  []  Patient ill  []  Conflicting appointment  []  No transportation    []  Conflict with work  [x]  No reason given  []  Other:     Comments:      Electronically signed by:  Pippa Dudley PTA

## 2019-08-27 ENCOUNTER — HOSPITAL ENCOUNTER (OUTPATIENT)
Age: 39
Setting detail: OBSERVATION
Discharge: HOME | End: 2019-08-28
Attending: EMERGENCY MEDICINE | Admitting: HOSPITALIST
Payer: COMMERCIAL

## 2019-08-27 DIAGNOSIS — R51.9 ACUTE NONINTRACTABLE HEADACHE, UNSPECIFIED HEADACHE TYPE: ICD-10-CM

## 2019-08-27 DIAGNOSIS — M54.2 ACUTE NECK PAIN: ICD-10-CM

## 2019-08-27 DIAGNOSIS — H53.9 VISUAL CHANGES: ICD-10-CM

## 2019-08-27 DIAGNOSIS — I65.02 STENOSIS OF LEFT VERTEBRAL ARTERY: Primary | ICD-10-CM

## 2019-08-27 PROCEDURE — 99285 EMERGENCY DEPT VISIT HI MDM: CPT

## 2019-08-27 RX ORDER — TIZANIDINE 2 MG/1
2 TABLET ORAL EVERY 6 HOURS PRN
COMMUNITY
End: 2019-12-18

## 2019-08-27 ASSESSMENT — PAIN DESCRIPTION - PAIN TYPE: TYPE: ACUTE PAIN

## 2019-08-27 ASSESSMENT — PAIN DESCRIPTION - LOCATION: LOCATION: NECK

## 2019-08-28 ENCOUNTER — APPOINTMENT (OUTPATIENT)
Dept: CT IMAGING | Age: 39
End: 2019-08-28
Payer: COMMERCIAL

## 2019-08-28 ENCOUNTER — APPOINTMENT (OUTPATIENT)
Dept: MRI IMAGING | Age: 39
End: 2019-08-28
Payer: COMMERCIAL

## 2019-08-28 VITALS
HEART RATE: 71 BPM | TEMPERATURE: 97.6 F | RESPIRATION RATE: 16 BRPM | SYSTOLIC BLOOD PRESSURE: 104 MMHG | HEIGHT: 65 IN | WEIGHT: 154.4 LBS | OXYGEN SATURATION: 98 % | DIASTOLIC BLOOD PRESSURE: 65 MMHG | BODY MASS INDEX: 25.72 KG/M2

## 2019-08-28 PROBLEM — H53.9 VISUAL CHANGES: Status: ACTIVE | Noted: 2019-08-28

## 2019-08-28 PROBLEM — I63.9 CRYPTOGENIC STROKE (HCC): Status: ACTIVE | Noted: 2019-08-28

## 2019-08-28 LAB
A/G RATIO: 1.4 (ref 1.1–2.2)
ALBUMIN SERPL-MCNC: 4.3 G/DL (ref 3.4–5)
ALP BLD-CCNC: 93 U/L (ref 40–129)
ALT SERPL-CCNC: 24 U/L (ref 10–40)
ANION GAP SERPL CALCULATED.3IONS-SCNC: 11 MMOL/L (ref 3–16)
APTT: 31.6 SEC (ref 26–36)
AST SERPL-CCNC: 20 U/L (ref 15–37)
BASOPHILS ABSOLUTE: 0.1 K/UL (ref 0–0.2)
BASOPHILS RELATIVE PERCENT: 1.1 %
BILIRUB SERPL-MCNC: 0.3 MG/DL (ref 0–1)
BUN BLDV-MCNC: 13 MG/DL (ref 7–20)
CALCIUM SERPL-MCNC: 8.9 MG/DL (ref 8.3–10.6)
CHLORIDE BLD-SCNC: 101 MMOL/L (ref 99–110)
CO2: 28 MMOL/L (ref 21–32)
CREAT SERPL-MCNC: 1 MG/DL (ref 0.9–1.3)
EOSINOPHILS ABSOLUTE: 0 K/UL (ref 0–0.6)
EOSINOPHILS RELATIVE PERCENT: 0.6 %
GFR AFRICAN AMERICAN: >60
GFR NON-AFRICAN AMERICAN: >60
GLOBULIN: 3.1 G/DL
GLUCOSE BLD-MCNC: 105 MG/DL (ref 70–99)
HCT VFR BLD CALC: 42.8 % (ref 40.5–52.5)
HEMOGLOBIN: 15.1 G/DL (ref 13.5–17.5)
INR BLD: 0.95 (ref 0.86–1.14)
LV EF: 55 %
LVEF MODALITY: NORMAL
LYMPHOCYTES ABSOLUTE: 3.8 K/UL (ref 1–5.1)
LYMPHOCYTES RELATIVE PERCENT: 47.1 %
MCH RBC QN AUTO: 30.3 PG (ref 26–34)
MCHC RBC AUTO-ENTMCNC: 35.3 G/DL (ref 31–36)
MCV RBC AUTO: 86 FL (ref 80–100)
MONOCYTES ABSOLUTE: 0.6 K/UL (ref 0–1.3)
MONOCYTES RELATIVE PERCENT: 6.9 %
NEUTROPHILS ABSOLUTE: 3.6 K/UL (ref 1.7–7.7)
NEUTROPHILS RELATIVE PERCENT: 44.3 %
PDW BLD-RTO: 13 % (ref 12.4–15.4)
PLATELET # BLD: 260 K/UL (ref 135–450)
PMV BLD AUTO: 9.1 FL (ref 5–10.5)
POTASSIUM SERPL-SCNC: 3.9 MMOL/L (ref 3.5–5.1)
PROTHROMBIN TIME: 10.8 SEC (ref 9.8–13)
RBC # BLD: 4.97 M/UL (ref 4.2–5.9)
SODIUM BLD-SCNC: 140 MMOL/L (ref 136–145)
TOTAL PROTEIN: 7.4 G/DL (ref 6.4–8.2)
TROPONIN: <0.01 NG/ML
TROPONIN: <0.01 NG/ML
WBC # BLD: 8.1 K/UL (ref 4–11)

## 2019-08-28 PROCEDURE — 6360000002 HC RX W HCPCS: Performed by: HOSPITALIST

## 2019-08-28 PROCEDURE — 93306 TTE W/DOPPLER COMPLETE: CPT

## 2019-08-28 PROCEDURE — 2580000003 HC RX 258: Performed by: HOSPITALIST

## 2019-08-28 PROCEDURE — 70498 CT ANGIOGRAPHY NECK: CPT

## 2019-08-28 PROCEDURE — 94760 N-INVAS EAR/PLS OXIMETRY 1: CPT

## 2019-08-28 PROCEDURE — 85610 PROTHROMBIN TIME: CPT

## 2019-08-28 PROCEDURE — 80053 COMPREHEN METABOLIC PANEL: CPT

## 2019-08-28 PROCEDURE — 96372 THER/PROPH/DIAG INJ SC/IM: CPT

## 2019-08-28 PROCEDURE — 70496 CT ANGIOGRAPHY HEAD: CPT

## 2019-08-28 PROCEDURE — 85730 THROMBOPLASTIN TIME PARTIAL: CPT

## 2019-08-28 PROCEDURE — 96374 THER/PROPH/DIAG INJ IV PUSH: CPT

## 2019-08-28 PROCEDURE — 96376 TX/PRO/DX INJ SAME DRUG ADON: CPT

## 2019-08-28 PROCEDURE — 6360000002 HC RX W HCPCS: Performed by: PHYSICIAN ASSISTANT

## 2019-08-28 PROCEDURE — 99219 PR INITIAL OBSERVATION CARE/DAY 50 MINUTES: CPT | Performed by: PSYCHIATRY & NEUROLOGY

## 2019-08-28 PROCEDURE — 6370000000 HC RX 637 (ALT 250 FOR IP): Performed by: PHYSICIAN ASSISTANT

## 2019-08-28 PROCEDURE — 92610 EVALUATE SWALLOWING FUNCTION: CPT

## 2019-08-28 PROCEDURE — 99244 OFF/OP CNSLTJ NEW/EST MOD 40: CPT | Performed by: INTERNAL MEDICINE

## 2019-08-28 PROCEDURE — 36415 COLL VENOUS BLD VENIPUNCTURE: CPT

## 2019-08-28 PROCEDURE — 96375 TX/PRO/DX INJ NEW DRUG ADDON: CPT

## 2019-08-28 PROCEDURE — G0378 HOSPITAL OBSERVATION PER HR: HCPCS

## 2019-08-28 PROCEDURE — 97161 PT EVAL LOW COMPLEX 20 MIN: CPT

## 2019-08-28 PROCEDURE — 70551 MRI BRAIN STEM W/O DYE: CPT

## 2019-08-28 PROCEDURE — 85025 COMPLETE CBC W/AUTO DIFF WBC: CPT

## 2019-08-28 PROCEDURE — 6370000000 HC RX 637 (ALT 250 FOR IP): Performed by: HOSPITALIST

## 2019-08-28 PROCEDURE — 84484 ASSAY OF TROPONIN QUANT: CPT

## 2019-08-28 PROCEDURE — 6360000004 HC RX CONTRAST MEDICATION: Performed by: INTERNAL MEDICINE

## 2019-08-28 PROCEDURE — 6360000004 HC RX CONTRAST MEDICATION: Performed by: PHYSICIAN ASSISTANT

## 2019-08-28 RX ORDER — ONDANSETRON 2 MG/ML
4 INJECTION INTRAMUSCULAR; INTRAVENOUS ONCE
Status: COMPLETED | OUTPATIENT
Start: 2019-08-28 | End: 2019-08-28

## 2019-08-28 RX ORDER — SODIUM CHLORIDE 9 MG/ML
INJECTION, SOLUTION INTRAVENOUS CONTINUOUS
Status: DISCONTINUED | OUTPATIENT
Start: 2019-08-28 | End: 2019-08-28 | Stop reason: HOSPADM

## 2019-08-28 RX ORDER — ASPIRIN 81 MG/1
81 TABLET ORAL DAILY
Status: DISCONTINUED | OUTPATIENT
Start: 2019-08-28 | End: 2019-08-28 | Stop reason: HOSPADM

## 2019-08-28 RX ORDER — SODIUM CHLORIDE 0.9 % (FLUSH) 0.9 %
10 SYRINGE (ML) INJECTION PRN
Status: DISCONTINUED | OUTPATIENT
Start: 2019-08-28 | End: 2019-08-28 | Stop reason: HOSPADM

## 2019-08-28 RX ORDER — HYDROMORPHONE HYDROCHLORIDE 1 MG/ML
1 INJECTION, SOLUTION INTRAMUSCULAR; INTRAVENOUS; SUBCUTANEOUS ONCE
Status: COMPLETED | OUTPATIENT
Start: 2019-08-28 | End: 2019-08-28

## 2019-08-28 RX ORDER — ASPIRIN 300 MG/1
300 SUPPOSITORY RECTAL DAILY
Status: DISCONTINUED | OUTPATIENT
Start: 2019-08-28 | End: 2019-08-28 | Stop reason: HOSPADM

## 2019-08-28 RX ORDER — ONDANSETRON 2 MG/ML
4 INJECTION INTRAMUSCULAR; INTRAVENOUS EVERY 6 HOURS PRN
Status: DISCONTINUED | OUTPATIENT
Start: 2019-08-28 | End: 2019-08-28 | Stop reason: HOSPADM

## 2019-08-28 RX ORDER — ASPIRIN 325 MG
325 TABLET ORAL ONCE
Status: COMPLETED | OUTPATIENT
Start: 2019-08-28 | End: 2019-08-28

## 2019-08-28 RX ORDER — LABETALOL HYDROCHLORIDE 5 MG/ML
10 INJECTION, SOLUTION INTRAVENOUS EVERY 10 MIN PRN
Status: DISCONTINUED | OUTPATIENT
Start: 2019-08-28 | End: 2019-08-28 | Stop reason: HOSPADM

## 2019-08-28 RX ORDER — CLOPIDOGREL BISULFATE 75 MG/1
75 TABLET ORAL DAILY
Qty: 30 TABLET | Refills: 0 | Status: SHIPPED | OUTPATIENT
Start: 2019-08-28 | End: 2019-09-18 | Stop reason: SDUPTHER

## 2019-08-28 RX ORDER — SODIUM CHLORIDE 0.9 % (FLUSH) 0.9 %
10 SYRINGE (ML) INJECTION EVERY 12 HOURS SCHEDULED
Status: DISCONTINUED | OUTPATIENT
Start: 2019-08-28 | End: 2019-08-28 | Stop reason: HOSPADM

## 2019-08-28 RX ADMIN — ASPIRIN 81 MG: 81 TABLET, COATED ORAL at 08:55

## 2019-08-28 RX ADMIN — Medication 10 ML: at 08:52

## 2019-08-28 RX ADMIN — HYDROMORPHONE HYDROCHLORIDE 1 MG: 1 INJECTION, SOLUTION INTRAMUSCULAR; INTRAVENOUS; SUBCUTANEOUS at 04:20

## 2019-08-28 RX ADMIN — ASPIRIN 325 MG ORAL TABLET 325 MG: 325 PILL ORAL at 02:11

## 2019-08-28 RX ADMIN — SODIUM CHLORIDE: 9 INJECTION, SOLUTION INTRAVENOUS at 08:52

## 2019-08-28 RX ADMIN — PERFLUTREN 1.65 MG: 6.52 INJECTION, SUSPENSION INTRAVENOUS at 10:30

## 2019-08-28 RX ADMIN — ENOXAPARIN SODIUM 40 MG: 40 INJECTION SUBCUTANEOUS at 08:55

## 2019-08-28 RX ADMIN — HYDROMORPHONE HYDROCHLORIDE 1 MG: 1 INJECTION, SOLUTION INTRAMUSCULAR; INTRAVENOUS; SUBCUTANEOUS at 00:18

## 2019-08-28 RX ADMIN — IOPAMIDOL 75 ML: 755 INJECTION, SOLUTION INTRAVENOUS at 01:11

## 2019-08-28 RX ADMIN — ONDANSETRON 4 MG: 2 INJECTION INTRAMUSCULAR; INTRAVENOUS at 00:17

## 2019-08-28 ASSESSMENT — ENCOUNTER SYMPTOMS
RHINORRHEA: 0
COUGH: 0
WHEEZING: 0
ABDOMINAL PAIN: 0
SHORTNESS OF BREATH: 0
DIARRHEA: 0
VOMITING: 1
NAUSEA: 1

## 2019-08-28 ASSESSMENT — PAIN SCALES - GENERAL
PAINLEVEL_OUTOF10: 6
PAINLEVEL_OUTOF10: 2
PAINLEVEL_OUTOF10: 8
PAINLEVEL_OUTOF10: 2

## 2019-08-28 NOTE — PROGRESS NOTES
4 Eyes Skin Assessment     The patient is being assess for  Admission    I agree that 2 RN's have performed a thorough Head to Toe Skin Assessment on the patient. ALL assessment sites listed below have been assessed. Areas assessed by both nurses:   [x]   Head, Face, and Ears   []   Shoulders, Back, and Chest  [x]   Arms, Elbows, and Hands   [x]   Coccyx, Sacrum, and IschIum  [x]   Legs, Feet, and Heels        Does the Patient have Skin Breakdown?   No         Flakito Prevention initiated:  No   Wound Care Orders initiated:  No      [facility] nurse consulted for Pressure Injury (Stage 3,4, Unstageable, DTI, NWPT, and Complex wounds), New and Established Ostomies:  No      Nurse 1 eSignature: Electronically signed by Eva Porter RN on 8/28/19 at 6:49 AM    **SHARE this note so that the co-signing nurse is able to place an eSignature**    Nurse 2 eSignature: Electronically signed by Jeet Lucas RN on 8/28/19 at 9:42 AM
Patient to echo at this time
Car  Occupation: Full time employment  Type of occupation: distribution [employer]  Leisure & Hobbies: video games  Additional Comments: no falls  Cognition        Objective     Observation/Palpation  Posture: Good    AROM RLE (degrees)  RLE AROM: WFL  AROM LLE (degrees)  LLE AROM : WFL  Strength RLE  Strength RLE: WFL  Comment: gross LE strength 5/5   Strength LLE  Strength LLE: WFL  Comment: gross LE strength 5/5   Tone RLE  RLE Tone: Normotonic  Tone LLE  LLE Tone: Normotonic  Motor Control  Gross Motor?: WFL  Sensation  Overall Sensation Status: WNL  Bed mobility  Rolling to Right: Modified independent  Supine to Sit: Modified independent  Scooting: Modified independent  Comment: head of bed elevated  Transfers  Sit to Stand: Modified independent  Stand to sit: Modified independent  Ambulation  Ambulation?: Yes  Ambulation 1  Surface: level tile  Device: No Device  Assistance: Modified Independent  Quality of Gait: Pt ambulates with IR of RLE and increased lateral sway. No LOB. Distance: 270'  Stairs/Curb  Stairs?: No     Balance  Posture: Good  Sitting - Static: Good  Sitting - Dynamic: Good  Standing - Static: Good  Standing - Dynamic: Good        Plan   Plan  Times per week: d/c  Safety Devices  Type of devices:  All fall risk precautions in place, Call light within reach, Left in bed, Gait belt    G-Code       OutComes Score                                                  AM-PAC Score  AM-PAC Inpatient Mobility Raw Score : 24 (08/28/19 1147)  -PAC Inpatient T-Scale Score : 61.14 (08/28/19 1147)  Mobility Inpatient CMS 0-100% Score: 0 (08/28/19 1147)  Mobility Inpatient CMS G-Code Modifier : [address] (08/28/19 1147)          Goals  Short term goals  Time Frame for Short term goals: pt to be d/c from acute PT caseload       Therapy Time   Individual Concurrent Group Co-treatment   Time In 1128         Time Out 1137         Minutes 9         Timed Code Treatment Minutes: 0 Minutes       Sanjay

## 2019-08-28 NOTE — ED PROVIDER NOTES
I independently performed a history and physical on Jasmeet Garcia II. All diagnostic, treatment, and disposition decisions were made by myself in conjunction with the advanced practice provider. Briefly, this is a 41 y.o. male here for neck pain, vomiting, vision changes. Patient states he has previous history of a stroke. On exam, nontoxic-appearing adult male in no acute distress. Pupils equally round and reactive to light, extra muscles intact, no facial asymmetry, no dysarthria, normal strength and sensation upper and lower extremity's. EKG  The Ekg interpreted by me in the absence of a cardiologist shows:    No significant change from prior EKG dated         Screenings            Critical Time  None       MDM  Adult male with symptoms of neck pain, headaches, changes in his vision and vomiting. CTA of the neck and head ordered. Patient has worrisome vertebral artery stenosis however this does not seem to clearly fit with the patient's presenting complaints. He is given aspirin. Because of the worsening disease I do think he should be admitted to hospital for further care and evaluation. He is agreeable with this plan. Hospitalist was made aware. Patient Referrals:  Andressa Riggins MD  [address] [place] [address]  [phone]            Discharge Medications:  New Prescriptions    No medications on file       FINAL IMPRESSION  1. Stenosis of left vertebral artery    2. Acute neck pain    3. Acute nonintractable headache, unspecified headache type    4. Visual changes        Blood pressure 128/77, pulse 69, temperature 97.8 °F (36.6 °C), temperature source Oral, resp. rate 18, height 5' 5\" (1.651 m), weight 155 lb (70.3 kg), SpO2 99 %. For further details of Jasmeet [facility] emergency department encounter, please see documentation by advanced practice provider.        Leah Zaragoza MD  08/28/19 4245
as of this dictation. EKG: All EKG's are interpreted by the Emergency Department Physician in the absence of a cardiologist.  Please see their note for interpretation of EKG. RADIOLOGY:   Non-plain film images such as CT, Ultrasound and MRI are read by the radiologist. Lucien Bar radiographic images are visualized andpreliminarily interpreted by the  ED Provider with the below findings:        Interpretation perthe Radiologist below, if available at the time of this note:    CTA NECK W CONTRAST   Final Result   Severe stenosis identified involving the ostium/proximal V1 segment left   vertebral artery. Otherwise no evidence of hemodynamic stenosis or occlusion involving the   intracranial arterial circulation         CTA HEAD W CONTRAST   Final Result   Severe stenosis identified involving the ostium/proximal V1 segment left   vertebral artery. Otherwise no evidence of hemodynamic stenosis or occlusion involving the   intracranial arterial circulation           No results found.         PROCEDURES   Unless otherwise noted below, none     Procedures    CRITICAL CARE TIME   N/A    CONSULTS:  IP CONSULT TO HOSPITALIST      EMERGENCY DEPARTMENT COURSE and DIFFERENTIAL DIAGNOSIS/MDM:   Vitals:    Vitals:    08/28/19 0024 08/28/19 0040 08/28/19 0100 08/28/19 0240   BP: (!) 138/93 121/77 129/82 128/77   Pulse:       Resp:       Temp:       TempSrc:       SpO2: 97% 94% 96% 99%   Weight:       Height:           Patient was given thefollowing medications:  Medications   HYDROmorphone HCl PF (DILAUDID) injection 1 mg (1 mg Intravenous Given 8/28/19 0018)   ondansetron (ZOFRAN) injection 4 mg (4 mg Intravenous Given 8/28/19 0017)   iopamidol (ISOVUE-370) 76 % injection 75 mL (75 mLs Intravenous Given 8/28/19 0111)   aspirin tablet 325 mg (325 mg Oral Given 8/28/19 0211)       She presents for evaluation of severe neck pain, history of vertebral artery stenosis, dissection and multiple embolic events concerning for

## 2019-08-28 NOTE — CONSULTS
End Date Taking? Authorizing Provider   tiZANidine (ZANAFLEX) 2 MG tablet Take 2 mg by mouth every 6 hours as needed   Yes Historical Provider, MD   aspirin 81 MG tablet Take 81 mg by mouth every other day Indications: patient takes as needed Qod, due to nasal bleeding out of lt nostril only   Yes Historical Provider, MD        Allergies:  Patient has no known allergies. Review of Systems:   A complete review of systems has been reviewed and updated today and is negative except as noted in the history of present illness.       Physical Examination:    Vitals:    08/28/19 1316   BP:    Pulse: 71   Resp:    Temp:    SpO2:     Weight: 154 lb 6.4 oz (70 kg)         General Appearance:  Alert, cooperative, no distress, appears stated age   Head:  Normocephalic, without obvious abnormality, atraumatic   Eyes:  EOMI, conjunctiva/corneas clear       Nose: Nares normal   Throat: Lips normal   Neck: Supple, symmetrical, trachea midline,  no carotid bruit or JVD       Lungs:   Clear to auscultation bilaterally, respirations unlabored   Chest Wall:  No tenderness or deformity   Heart:  Regular rate and rhythm, S1, S2 normal, no murmur, rub or gallop   Abdomen:   Soft, non-tender, bowel sounds active all four quadrants,  no masses, no organomegaly           Extremities: Extremities normal, atraumatic, no cyanosis or edema   Pulses: 2+ and symmetric   Skin: Skin color, texture, turgor normal, no rashes or lesions   Pysch: Normal mood and affect   Neurologic: Normal gross motor and sensory exam.         Labs  CBC:   Lab Results   Component Value Date    WBC 8.1 08/28/2019    RBC 4.97 08/28/2019    HGB 15.1 08/28/2019    HCT 42.8 08/28/2019    MCV 86.0 08/28/2019    RDW 13.0 08/28/2019     08/28/2019     CMP:    Lab Results   Component Value Date     08/28/2019    K 3.9 08/28/2019    K 4.5 10/01/2018     08/28/2019    CO2 28 08/28/2019    BUN 13 08/28/2019    CREATININE 1.0 08/28/2019    GFRAA >60 08/28/2019 cardiac event monitor which was unremarkable. Loop recorder was discussed at that time but not implanted. Recommend implantation of loop recorder after discussion with the patient, his family and neurology. EP agreeable to implanted for indication of cryptogenic stroke. Also recommend adding Plavix to aspirin. Stenosis of left vertebral artery  Plan: Unclear how this factors into symptoms. Recommend further evaluation by neurology and if necessary neurosurgery. Abnormal MRI cervical spine  Plan: Spoke with neurology regarding this might be contributing to symptoms. If indicated, repeat MRI cervical spine. Recommend neurosurgical opinion. Thank you for allowing us to participate in the care of Marilyn Castellano II. Further evaluation will be based upon the patient's clinical course and testing results. All questions and concerns were addressed to the patient/family. Alternatives to my treatment were discussed. The note was completed using EMR. Every effort was made to ensure accuracy; however, inadvertent computerized transcription errors may be present.     Chayito Gutierrez M.D.

## 2019-08-28 NOTE — CONSULTS
Subcutaneous Daily Yue Krishnan MD   40 mg at 08/28/19 0855    aspirin EC tablet 81 mg  81 mg Oral Daily Gerard Gaitan MD   81 mg at 08/28/19 6314    Or    aspirin suppository 300 mg  300 mg Rectal Daily Gerard Gaitan MD        0.9 % sodium chloride infusion   Intravenous Continuous Gerard Gaitan MD 75 mL/hr at 08/28/19 0852      labetalol (NORMODYNE;TRANDATE) injection 10 mg  10 mg Intravenous Q10 Min PRN Gerard Gaitan MD         No Known Allergies    PHYSICAL EXAMINATION:  /65   Pulse 71   Temp 97.6 °F (36.4 °C) (Oral)   Resp 16   Ht 5' 5\" (1.651 m)   Wt 154 lb 6.4 oz (70 kg)   SpO2 98%   BMI 25.69 kg/m²   Appearance: Well appearing, well nourished and in no distress  Mental Status Exam: Patient is alert, oriented to person, place and time. Recent and remote memory is normal  Fund of Knowledge is normal  Attention/concentration is normal.   Speech : No dysarthria  Language : No aphasia  Funduscopic Exam: sharp disc margins  Cranial Nerves:   II: Visual fields:  Full to confrontation  III: Pupils:  equal, round, reactive to light  III,IV,VI: Extra Ocular Movements are intact. No nystagmus  V: Facial sensation is intact to pin prick and light touch  VII: Facial strength and movements: intact and symmetric smile,cheek puffing and eyebrow elevation  VIII: Hearing:  Intact to finger rub bilaterally  IX: Palate  elevation is symmetric  XI: Shoulder shrug is intact  XII: Tongue movements are normal  Motor:  Muscle tone and bulk are normal.   Strength is symmetrical 5/5 in all four extremities. Sensory: Intact to light touch and  pin prick in all four extremities  Coordination:  Normal  Finger to Nose and Heel to Shin bilaterally    . Reflexes:  DTR +2 and symmetric bilaterally  Plantar response: Flexor bilaterally  Gait: Gait and station is normal. Romberg: negative  Vascular: No carotid bruit bilaterally        DATA:  LABS:  General Labs:    CBC:   Lab Results   Component

## 2019-08-28 NOTE — H&P
changes , ? R/o TIA vs small CVA vs complicated Migraine attacks : CT head not done in ED and patient is now s/p post contrast given in ED . ?? . CTA shows worsening stenosis in left vertebral A  => Neurology checks while in patient. Fasting lipid panel in AM. Planned MRI in AM. => Allowing BP to auto regulate till neurology allows more strict control of BP. Permissive HTN for now . Prn medications for BP  > 220/110 [ No TPA Given ]  ==> Medication plan for now will be ASA QD . Planned neurology c/s in AM and defer further recommendations to Neurology   · Worsening left vertebral A stenosis : Case d/w Neuro Sx and vascular Sx by ED physician and both recommended medical Mgt , w/ ASA for now . Neurology c/s in AM and further recommendations per them   · H/o Migraines   · H/o B/L CVAs in past 2/2017 : Will monitor and adjust medications prn. For now, resumed home medications of ASA   · H/o Wilmerding Palsy in 2017   · Chronic smoker : smoking cessation counseling done again     · Home medications for Chronic medical problems reviewed. · Home medications Held/Resumed, and Pertinent changes made in home medications on admission, as needed, according to current medical status, as mentioned above. Please see EPIC orders for detailed recommendations of plan and medications       · DVT Prophylaxis :  Lovenox   S/q + SCDs   · GI Prophylaxis :  None as per orders   · Diet : +       · Code status : Full   · PT/OT and ambulatory Eval Status:  Ambulate As tolerated   · Probable LOS & future Disposition planned post discharge  - home in 1-2 days       Medical Decision Making : High     Total patient Care time spent in evaluating the patient an discussing plan with appropriate staff/patient/family members is 48 min       Brigid Ugalde MD    Hospitalist, [facility].    8/28/2019 5:35 AM

## 2019-08-29 ENCOUNTER — TELEPHONE (OUTPATIENT)
Dept: INTERNAL MEDICINE CLINIC | Age: 39
End: 2019-08-29

## 2019-08-29 ENCOUNTER — TELEPHONE (OUTPATIENT)
Dept: CARDIOLOGY CLINIC | Age: 39
End: 2019-08-29

## 2019-08-29 DIAGNOSIS — I63.40 CEREBRAL INFARCTION DUE TO EMBOLISM OF CEREBRAL ARTERY (HCC): Primary | ICD-10-CM

## 2019-09-03 ENCOUNTER — TELEPHONE (OUTPATIENT)
Dept: CARDIOLOGY CLINIC | Age: 39
End: 2019-09-03

## 2019-09-06 ENCOUNTER — OFFICE VISIT (OUTPATIENT)
Dept: INTERNAL MEDICINE CLINIC | Age: 39
End: 2019-09-06
Payer: COMMERCIAL

## 2019-09-06 VITALS
WEIGHT: 157 LBS | HEART RATE: 72 BPM | SYSTOLIC BLOOD PRESSURE: 118 MMHG | DIASTOLIC BLOOD PRESSURE: 70 MMHG | BODY MASS INDEX: 26.13 KG/M2

## 2019-09-06 DIAGNOSIS — Z86.73 CEREBELLAR CEREBROVASCULAR ACCIDENT (CVA) WITHOUT LATE EFFECT: ICD-10-CM

## 2019-09-06 DIAGNOSIS — I65.02 STENOSIS OF LEFT VERTEBRAL ARTERY: ICD-10-CM

## 2019-09-06 DIAGNOSIS — F17.219 CIGARETTE NICOTINE DEPENDENCE WITH NICOTINE-INDUCED DISORDER: ICD-10-CM

## 2019-09-06 DIAGNOSIS — F17.200 SMOKER: ICD-10-CM

## 2019-09-06 DIAGNOSIS — Z09 HOSPITAL DISCHARGE FOLLOW-UP: Primary | ICD-10-CM

## 2019-09-06 DIAGNOSIS — I63.9 CRYPTOGENIC STROKE (HCC): ICD-10-CM

## 2019-09-06 PROCEDURE — 1111F DSCHRG MED/CURRENT MED MERGE: CPT | Performed by: INTERNAL MEDICINE

## 2019-09-06 PROCEDURE — 99495 TRANSJ CARE MGMT MOD F2F 14D: CPT | Performed by: INTERNAL MEDICINE

## 2019-09-06 RX ORDER — NICOTINE 21 MG/24HR
1 PATCH, TRANSDERMAL 24 HOURS TRANSDERMAL DAILY
Qty: 42 PATCH | Refills: 0 | Status: SHIPPED | OUTPATIENT
Start: 2019-09-06 | End: 2020-01-31

## 2019-09-06 ASSESSMENT — PATIENT HEALTH QUESTIONNAIRE - PHQ9
2. FEELING DOWN, DEPRESSED OR HOPELESS: 0
SUM OF ALL RESPONSES TO PHQ QUESTIONS 1-9: 0
SUM OF ALL RESPONSES TO PHQ9 QUESTIONS 1 & 2: 0
1. LITTLE INTEREST OR PLEASURE IN DOING THINGS: 0
SUM OF ALL RESPONSES TO PHQ QUESTIONS 1-9: 0

## 2019-09-06 NOTE — PROGRESS NOTES
Post-Discharge Transitional Care Management Services or Hospital Follow Up      Tonya June II   YOB: 1980    Date of Office Visit:  9/6/2019  Date of Hospital Admission: 8/27/19  Date of Hospital Discharge: 8/28/19  Risk of hospital readmission (high >=14%.  Medium >=10%) :Readmission Risk Score: 0      Care management risk score Rising risk (score 2-5) and Complex Care (Scores >=6): 4     Non face to face  following discharge, date last encounter closed (first attempt may have been earlier): 8/29/2019 12:41 PM    Call initiated 2 business days of discharge: Yes    Patient Active Problem List   Diagnosis    Stenosis of left vertebral artery    Stroke syndrome    Cerebral infarction due to embolism of cerebral artery ([address]    Smoker    Neural foraminal stenosis, multilevel    Tear of left glenoid labrum    Visual changes    Cryptogenic stroke ([address]    Migraine with aura and without status migrainosus, not intractable       No Known Allergies    Medications listed as ordered at the time of discharge from [facility] \"[facility]\"   Home Medication Instructions NARENDRA:    Printed on:09/06/19 1300   Medication Information                      clopidogrel (PLAVIX) 75 MG tablet  Take 1 tablet by mouth daily             nicotine (NICODERM CQ) 21 MG/24HR  Place 1 patch onto the skin daily             tiZANidine (ZANAFLEX) 2 MG tablet  Take 2 mg by mouth every 6 hours as needed                   Medications marked \"taking\" at this time  Outpatient Medications Marked as Taking for the 9/6/19 encounter (Office Visit) with Naomie Hawkins MD   Medication Sig Dispense Refill    nicotine (NICODERM CQ) 21 MG/24HR Place 1 patch onto the skin daily 42 patch 0    clopidogrel (PLAVIX) 75 MG tablet Take 1 tablet by mouth daily 30 tablet 0    tiZANidine (ZANAFLEX) 2 MG tablet Take 2 mg by mouth every 6 hours as needed          Medications patient taking as of now reconciled against medications Decision Making: moderate complexity

## 2019-09-18 ENCOUNTER — TELEPHONE (OUTPATIENT)
Dept: INTERNAL MEDICINE CLINIC | Age: 39
End: 2019-09-18

## 2019-09-18 RX ORDER — CLOPIDOGREL BISULFATE 75 MG/1
75 TABLET ORAL DAILY
Qty: 30 TABLET | Refills: 5 | Status: SHIPPED | OUTPATIENT
Start: 2019-09-18 | End: 2020-01-31 | Stop reason: ALTCHOICE

## 2019-09-30 ENCOUNTER — OFFICE VISIT (OUTPATIENT)
Dept: ORTHOPEDIC SURGERY | Age: 39
End: 2019-09-30
Payer: COMMERCIAL

## 2019-09-30 VITALS
DIASTOLIC BLOOD PRESSURE: 84 MMHG | WEIGHT: 156.97 LBS | HEIGHT: 65 IN | BODY MASS INDEX: 26.15 KG/M2 | RESPIRATION RATE: 17 BRPM | SYSTOLIC BLOOD PRESSURE: 123 MMHG | HEART RATE: 86 BPM

## 2019-09-30 DIAGNOSIS — S43.432A TEAR OF LEFT GLENOID LABRUM, INITIAL ENCOUNTER: Primary | ICD-10-CM

## 2019-09-30 PROCEDURE — 99213 OFFICE O/P EST LOW 20 MIN: CPT | Performed by: ORTHOPAEDIC SURGERY

## 2019-09-30 NOTE — LETTER
[facility]  [facility] [address] [address]  Phone: [phone]  Fax: [phone]    Pierre Tate MD        September 30, 2019     Patient: Darius Elise II   YOB: 1980   Date of Visit: 9/30/2019       To Whom It May Concern: It is my medical opinion that Sadia Bone may return to work 9/30/19 with the following restrictions:  No lift up to 15lbs only, and no over-head activity for the next month. If you have any questions or concerns, please don't hesitate to call.     Sincerely,        Pierre Tate MD

## 2019-09-30 NOTE — PROGRESS NOTES
History:  America Quiroz II is here for follow up after left shoulder arthroscopic surgery. Surgery date was 3/1/19. Findings at surgery: ante. The patient's pain is rated at 0/10 currently. He states his shoulder was doing well until a couple days ago when he did a lot of overhead activity at work. He states work was not compliant about overhead activity restrictions. 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 He points to pain being located at his pec. Physical Examination:  /84   Pulse 86   Resp 17   Ht 5' 5\" (1.651 m)   Wt 156 lb 15.5 oz (71.2 kg)   BMI 26.12 kg/m²        Patient is awake, alert, and in no acute distress. Sensation is intact to light touch in the axillary, median, radial, and ulnar nerve distribution bilaterally. The EPL, FPL, and interossei are grossly intact bilaterally. The Bilateral upper extremities are warm and well-perfused with brisk capillary refill. Left shoulder active forward flexion 180, ER 80, IR L4  Right shoulder active forward flexion 180, ER 80, IR L4   5/5 Supraspinatus, External rotation, Internal rotation bilateral  Tenderness at pectoralis. He does have a click around the glenohumeral joint with passive forward flexion. Morris's equivocal.      Assessment:   7 months status post left shoulder arthroscopy, anterior labral repair      Plan:   OTC NSAIDs prn. Will place him back on lifting restrictions. Ice to shoulder. Follow up in 2 weeks for recheck.

## 2019-10-04 ENCOUNTER — HOSPITAL ENCOUNTER (OUTPATIENT)
Dept: MRI IMAGING | Age: 39
Discharge: HOME | End: 2019-10-04
Payer: COMMERCIAL

## 2019-10-04 DIAGNOSIS — G95.9 MYELOPATHY (HCC): ICD-10-CM

## 2019-10-04 PROCEDURE — 72141 MRI NECK SPINE W/O DYE: CPT

## 2019-10-30 ENCOUNTER — OFFICE VISIT (OUTPATIENT)
Dept: ORTHOPEDIC SURGERY | Age: 39
End: 2019-10-30
Payer: COMMERCIAL

## 2019-10-30 VITALS
RESPIRATION RATE: 17 BRPM | DIASTOLIC BLOOD PRESSURE: 83 MMHG | SYSTOLIC BLOOD PRESSURE: 118 MMHG | WEIGHT: 156.97 LBS | HEIGHT: 65 IN | BODY MASS INDEX: 26.15 KG/M2 | HEART RATE: 86 BPM

## 2019-10-30 DIAGNOSIS — S43.432A TEAR OF LEFT GLENOID LABRUM, INITIAL ENCOUNTER: Primary | ICD-10-CM

## 2019-10-30 PROCEDURE — 99213 OFFICE O/P EST LOW 20 MIN: CPT | Performed by: ORTHOPAEDIC SURGERY

## 2019-11-11 ENCOUNTER — TELEPHONE (OUTPATIENT)
Dept: INTERNAL MEDICINE CLINIC | Age: 39
End: 2019-11-11

## 2019-11-12 ENCOUNTER — OFFICE VISIT (OUTPATIENT)
Dept: INTERNAL MEDICINE CLINIC | Age: 39
End: 2019-11-12
Payer: COMMERCIAL

## 2019-11-12 VITALS
HEART RATE: 78 BPM | WEIGHT: 155 LBS | DIASTOLIC BLOOD PRESSURE: 80 MMHG | SYSTOLIC BLOOD PRESSURE: 118 MMHG | BODY MASS INDEX: 25.83 KG/M2 | HEIGHT: 65 IN

## 2019-11-12 DIAGNOSIS — G43.C1 INTRACTABLE PERIODIC HEADACHE SYNDROME: Primary | ICD-10-CM

## 2019-11-12 DIAGNOSIS — I63.9 CRYPTOGENIC STROKE (HCC): ICD-10-CM

## 2019-11-12 DIAGNOSIS — G43.109 MIGRAINE WITH AURA AND WITHOUT STATUS MIGRAINOSUS, NOT INTRACTABLE: ICD-10-CM

## 2019-11-12 PROCEDURE — 99213 OFFICE O/P EST LOW 20 MIN: CPT | Performed by: INTERNAL MEDICINE

## 2019-11-12 RX ORDER — ACETAMINOPHEN 500 MG
500 TABLET ORAL 4 TIMES DAILY PRN
Qty: 60 TABLET | Refills: 0 | COMMUNITY
Start: 2019-11-12 | End: 2020-01-31

## 2019-11-12 ASSESSMENT — ENCOUNTER SYMPTOMS
NAUSEA: 0
ABDOMINAL PAIN: 0
VOMITING: 0
SHORTNESS OF BREATH: 0
WHEEZING: 0

## 2019-12-03 ENCOUNTER — OFFICE VISIT (OUTPATIENT)
Dept: ORTHOPEDIC SURGERY | Age: 39
End: 2019-12-03
Payer: COMMERCIAL

## 2019-12-03 VITALS — RESPIRATION RATE: 17 BRPM | WEIGHT: 154.98 LBS | HEIGHT: 65 IN | HEART RATE: 78 BPM | BODY MASS INDEX: 25.82 KG/M2

## 2019-12-03 DIAGNOSIS — S43.432A TEAR OF LEFT GLENOID LABRUM, INITIAL ENCOUNTER: Primary | ICD-10-CM

## 2019-12-03 PROCEDURE — 99213 OFFICE O/P EST LOW 20 MIN: CPT | Performed by: ORTHOPAEDIC SURGERY

## 2019-12-06 ENCOUNTER — OFFICE VISIT (OUTPATIENT)
Dept: INTERNAL MEDICINE CLINIC | Age: 39
End: 2019-12-06
Payer: COMMERCIAL

## 2019-12-06 VITALS
HEIGHT: 65 IN | BODY MASS INDEX: 25.99 KG/M2 | WEIGHT: 156 LBS | SYSTOLIC BLOOD PRESSURE: 112 MMHG | DIASTOLIC BLOOD PRESSURE: 72 MMHG | HEART RATE: 78 BPM

## 2019-12-06 DIAGNOSIS — M48.00 MULTILEVEL FORAMINAL STENOSIS: ICD-10-CM

## 2019-12-06 DIAGNOSIS — Z86.73 CEREBELLAR CEREBROVASCULAR ACCIDENT (CVA) WITHOUT LATE EFFECT: ICD-10-CM

## 2019-12-06 DIAGNOSIS — M47.22 OSTEOARTHRITIS OF SPINE WITH RADICULOPATHY, CERVICAL REGION: ICD-10-CM

## 2019-12-06 DIAGNOSIS — G43.C1 INTRACTABLE PERIODIC HEADACHE SYNDROME: ICD-10-CM

## 2019-12-06 DIAGNOSIS — G43.109 MIGRAINE WITH AURA AND WITHOUT STATUS MIGRAINOSUS, NOT INTRACTABLE: ICD-10-CM

## 2019-12-06 DIAGNOSIS — I65.02 STENOSIS OF LEFT VERTEBRAL ARTERY: ICD-10-CM

## 2019-12-06 DIAGNOSIS — I63.9 CRYPTOGENIC STROKE (HCC): Primary | ICD-10-CM

## 2019-12-06 PROCEDURE — 99213 OFFICE O/P EST LOW 20 MIN: CPT | Performed by: INTERNAL MEDICINE

## 2019-12-06 ASSESSMENT — ENCOUNTER SYMPTOMS
NAUSEA: 0
WHEEZING: 0
ABDOMINAL PAIN: 0
VOMITING: 0
SHORTNESS OF BREATH: 0

## 2019-12-13 ENCOUNTER — HOSPITAL ENCOUNTER (OUTPATIENT)
Dept: CT IMAGING | Age: 39
Discharge: HOME | End: 2019-12-13
Payer: COMMERCIAL

## 2019-12-13 ENCOUNTER — APPOINTMENT (OUTPATIENT)
Dept: CT IMAGING | Age: 39
End: 2019-12-13
Payer: COMMERCIAL

## 2019-12-13 DIAGNOSIS — I77.74: ICD-10-CM

## 2019-12-13 DIAGNOSIS — I65.02 OCCLUSION AND STENOSIS OF LEFT VERTEBRAL ARTERY: ICD-10-CM

## 2019-12-13 PROCEDURE — 6360000004 HC RX CONTRAST MEDICATION: Performed by: NEUROLOGICAL SURGERY

## 2019-12-13 PROCEDURE — 70496 CT ANGIOGRAPHY HEAD: CPT

## 2019-12-13 PROCEDURE — 70498 CT ANGIOGRAPHY NECK: CPT

## 2019-12-13 RX ADMIN — IOPAMIDOL 75 ML: 755 INJECTION, SOLUTION INTRAVENOUS at 12:24

## 2019-12-17 ENCOUNTER — APPOINTMENT (OUTPATIENT)
Dept: GENERAL RADIOLOGY | Age: 39
End: 2019-12-17
Payer: COMMERCIAL

## 2019-12-17 ENCOUNTER — HOSPITAL ENCOUNTER (EMERGENCY)
Age: 39
Discharge: HOME | End: 2019-12-18
Attending: EMERGENCY MEDICINE
Payer: COMMERCIAL

## 2019-12-17 VITALS
RESPIRATION RATE: 16 BRPM | DIASTOLIC BLOOD PRESSURE: 84 MMHG | WEIGHT: 155 LBS | HEART RATE: 87 BPM | TEMPERATURE: 98 F | BODY MASS INDEX: 25.83 KG/M2 | HEIGHT: 65 IN | OXYGEN SATURATION: 98 % | SYSTOLIC BLOOD PRESSURE: 125 MMHG

## 2019-12-17 DIAGNOSIS — R07.89 MUSCULAR CHEST PAIN: Primary | ICD-10-CM

## 2019-12-17 PROCEDURE — 71046 X-RAY EXAM CHEST 2 VIEWS: CPT

## 2019-12-17 PROCEDURE — 93005 ELECTROCARDIOGRAM TRACING: CPT | Performed by: NURSE PRACTITIONER

## 2019-12-17 PROCEDURE — 6370000000 HC RX 637 (ALT 250 FOR IP): Performed by: NURSE PRACTITIONER

## 2019-12-17 PROCEDURE — 99284 EMERGENCY DEPT VISIT MOD MDM: CPT

## 2019-12-17 RX ORDER — LIDOCAINE 4 G/G
1 PATCH TOPICAL ONCE
Status: DISCONTINUED | OUTPATIENT
Start: 2019-12-17 | End: 2019-12-18 | Stop reason: HOSPADM

## 2019-12-17 RX ORDER — NAPROXEN 250 MG/1
500 TABLET ORAL ONCE
Status: COMPLETED | OUTPATIENT
Start: 2019-12-17 | End: 2019-12-17

## 2019-12-17 RX ADMIN — NAPROXEN 500 MG: 250 TABLET ORAL at 23:26

## 2019-12-17 ASSESSMENT — ENCOUNTER SYMPTOMS
DIARRHEA: 0
RHINORRHEA: 0
VOMITING: 0
SHORTNESS OF BREATH: 0
SORE THROAT: 0
NAUSEA: 0
BLOOD IN STOOL: 0
CONSTIPATION: 0
ABDOMINAL PAIN: 0

## 2019-12-17 ASSESSMENT — PAIN SCALES - GENERAL: PAINLEVEL_OUTOF10: 8

## 2019-12-18 ENCOUNTER — HOSPITAL ENCOUNTER (OUTPATIENT)
Dept: CARDIAC CATH/INVASIVE PROCEDURES | Age: 39
Discharge: HOME | End: 2019-12-18
Attending: INTERNAL MEDICINE | Admitting: INTERNAL MEDICINE
Payer: COMMERCIAL

## 2019-12-18 VITALS
SYSTOLIC BLOOD PRESSURE: 123 MMHG | WEIGHT: 155 LBS | RESPIRATION RATE: 16 BRPM | DIASTOLIC BLOOD PRESSURE: 79 MMHG | OXYGEN SATURATION: 98 % | HEIGHT: 65 IN | TEMPERATURE: 98 F | HEART RATE: 66 BPM | BODY MASS INDEX: 25.83 KG/M2

## 2019-12-18 LAB
EKG ATRIAL RATE: 78 BPM
EKG DIAGNOSIS: NORMAL
EKG P AXIS: 54 DEGREES
EKG P-R INTERVAL: 128 MS
EKG Q-T INTERVAL: 400 MS
EKG QRS DURATION: 94 MS
EKG QTC CALCULATION (BAZETT): 456 MS
EKG R AXIS: 36 DEGREES
EKG T AXIS: 41 DEGREES
EKG VENTRICULAR RATE: 78 BPM

## 2019-12-18 PROCEDURE — 33285 INSJ SUBQ CAR RHYTHM MNTR: CPT | Performed by: INTERNAL MEDICINE

## 2019-12-18 PROCEDURE — 93010 ELECTROCARDIOGRAM REPORT: CPT | Performed by: INTERNAL MEDICINE

## 2019-12-18 PROCEDURE — C1764 EVENT RECORDER, CARDIAC: HCPCS

## 2019-12-18 PROCEDURE — 33285 INSJ SUBQ CAR RHYTHM MNTR: CPT

## 2019-12-18 RX ORDER — CYCLOBENZAPRINE HCL 10 MG
10 TABLET ORAL 3 TIMES DAILY PRN
Qty: 20 TABLET | Refills: 0 | Status: SHIPPED | OUTPATIENT
Start: 2019-12-18 | End: 2019-12-25

## 2019-12-26 ENCOUNTER — NURSE ONLY (OUTPATIENT)
Dept: CARDIOLOGY CLINIC | Age: 39
End: 2019-12-26
Payer: COMMERCIAL

## 2019-12-26 DIAGNOSIS — I63.40 CEREBRAL INFARCTION DUE TO EMBOLISM OF CEREBRAL ARTERY (HCC): ICD-10-CM

## 2019-12-26 DIAGNOSIS — I63.9 CRYPTOGENIC STROKE (HCC): ICD-10-CM

## 2019-12-26 DIAGNOSIS — Z95.818 STATUS POST PLACEMENT OF IMPLANTABLE LOOP RECORDER: ICD-10-CM

## 2019-12-26 PROCEDURE — 93291 INTERROG DEV EVAL SCRMS IP: CPT | Performed by: INTERNAL MEDICINE

## 2020-01-14 ENCOUNTER — OFFICE VISIT (OUTPATIENT)
Dept: INTERNAL MEDICINE CLINIC | Age: 40
End: 2020-01-14
Payer: COMMERCIAL

## 2020-01-14 DIAGNOSIS — R04.0 EPISTAXIS: ICD-10-CM

## 2020-01-14 LAB
HCT VFR BLD CALC: 43.6 % (ref 40.5–52.5)
HEMOGLOBIN: 14.8 G/DL (ref 13.5–17.5)
MCH RBC QN AUTO: 30.1 PG (ref 26–34)
MCHC RBC AUTO-ENTMCNC: 34 G/DL (ref 31–36)
MCV RBC AUTO: 88.7 FL (ref 80–100)
PDW BLD-RTO: 13.6 % (ref 12.4–15.4)
PLATELET # BLD: 272 K/UL (ref 135–450)
PMV BLD AUTO: 9.5 FL (ref 5–10.5)
RBC # BLD: 4.92 M/UL (ref 4.2–5.9)
WBC # BLD: 14 K/UL (ref 4–11)

## 2020-01-14 PROCEDURE — 99213 OFFICE O/P EST LOW 20 MIN: CPT | Performed by: INTERNAL MEDICINE

## 2020-01-14 ASSESSMENT — PATIENT HEALTH QUESTIONNAIRE - PHQ9
SUM OF ALL RESPONSES TO PHQ QUESTIONS 1-9: 0
2. FEELING DOWN, DEPRESSED OR HOPELESS: 0
SUM OF ALL RESPONSES TO PHQ9 QUESTIONS 1 & 2: 0
1. LITTLE INTEREST OR PLEASURE IN DOING THINGS: 0
SUM OF ALL RESPONSES TO PHQ QUESTIONS 1-9: 0

## 2020-01-14 ASSESSMENT — ENCOUNTER SYMPTOMS
WHEEZING: 0
SHORTNESS OF BREATH: 0
VOMITING: 0
NAUSEA: 0
ABDOMINAL PAIN: 0
TROUBLE SWALLOWING: 0
VOICE CHANGE: 0

## 2020-01-14 NOTE — PROGRESS NOTES
phone: Not on file     Gets together: Not on file     Attends Hoahaoism service: Not on file     Active member of club or organization: Not on file     Attends meetings of clubs or organizations: Not on file     Relationship status: Not on file    Intimate partner violence:     Fear of current or ex partner: Not on file     Emotionally abused: Not on file     Physically abused: Not on file     Forced sexual activity: Not on file   Other Topics Concern    Not on file   Social History Narrative    Not on file     Family History   Problem Relation Age of Onset    Heart Disease Mother     Stroke Mother     Heart Disease Father     Stroke Father        Review of Systems   HENT: Negative for trouble swallowing and voice change. Respiratory: Negative for shortness of breath and wheezing. Cardiovascular: Negative for chest pain and palpitations. Gastrointestinal: Negative for abdominal pain, nausea and vomiting. Neurological: Negative for dizziness and light-headedness. OBJECTIVE:  Pulse Readings from Last 4 Encounters:   12/18/19 66   12/17/19 87   12/06/19 78   12/03/19 78     Wt Readings from Last 4 Encounters:   12/18/19 155 lb (70.3 kg)   12/17/19 155 lb (70.3 kg)   12/06/19 156 lb (70.8 kg)   12/03/19 154 lb 15.7 oz (70.3 kg)     BP Readings from Last 4 Encounters:   12/18/19 123/79   12/17/19 125/84   12/06/19 112/72   11/12/19 118/80     Physical Exam  Vitals signs and nursing note reviewed. Constitutional:       Appearance: Normal appearance. HENT:      Nose: No congestion or rhinorrhea. Comments: Old clot at the left anterior nasal cavity  No active bleeding     Mouth/Throat:      Mouth: Mucous membranes are moist.   Eyes:      General: No scleral icterus. Conjunctiva/sclera: Conjunctivae normal.   Cardiovascular:      Rate and Rhythm: Normal rate and regular rhythm. Pulses: Normal pulses. Heart sounds: Normal heart sounds.    Pulmonary:      Effort: Pulmonary effort is the patient. Documentation was done using voice recognition dragon software. Every effort was made to ensure accuracy; however, inadvertent  Unintentional computerized transcription errors may be present.

## 2020-01-20 ENCOUNTER — TELEPHONE (OUTPATIENT)
Dept: INTERNAL MEDICINE CLINIC | Age: 40
End: 2020-01-20

## 2020-01-20 RX ORDER — TIZANIDINE 4 MG/1
4 TABLET ORAL EVERY 8 HOURS PRN
Qty: 90 TABLET | Refills: 0 | Status: SHIPPED | OUTPATIENT
Start: 2020-01-20 | End: 2020-03-13 | Stop reason: SDUPTHER

## 2020-01-28 ENCOUNTER — NURSE ONLY (OUTPATIENT)
Dept: CARDIOLOGY CLINIC | Age: 40
End: 2020-01-28
Payer: COMMERCIAL

## 2020-01-28 PROCEDURE — G2066 INTER DEVC REMOTE 30D: HCPCS | Performed by: INTERNAL MEDICINE

## 2020-01-28 PROCEDURE — 93298 REM INTERROG DEV EVAL SCRMS: CPT | Performed by: INTERNAL MEDICINE

## 2020-01-31 ENCOUNTER — OFFICE VISIT (OUTPATIENT)
Dept: ENT CLINIC | Age: 40
End: 2020-01-31
Payer: COMMERCIAL

## 2020-01-31 VITALS — HEART RATE: 65 BPM | DIASTOLIC BLOOD PRESSURE: 76 MMHG | OXYGEN SATURATION: 96 % | SYSTOLIC BLOOD PRESSURE: 122 MMHG

## 2020-01-31 PROCEDURE — 99202 OFFICE O/P NEW SF 15 MIN: CPT | Performed by: OTOLARYNGOLOGY

## 2020-01-31 PROCEDURE — 30901 CONTROL OF NOSEBLEED: CPT | Performed by: OTOLARYNGOLOGY

## 2020-01-31 RX ORDER — ASPIRIN 81 MG/1
TABLET ORAL
COMMUNITY
Start: 2020-01-20

## 2020-01-31 ASSESSMENT — ENCOUNTER SYMPTOMS
TROUBLE SWALLOWING: 0
RESPIRATORY NEGATIVE: 1
SINUS PRESSURE: 0
VOICE CHANGE: 0
RHINORRHEA: 0
SORE THROAT: 0
ALLERGIC/IMMUNOLOGIC NEGATIVE: 1
EYES NEGATIVE: 1
FACIAL SWELLING: 0
SINUS PAIN: 0

## 2020-01-31 NOTE — PROGRESS NOTES
Negative. Skin: Negative. Allergic/Immunologic: Negative. Neurological: Negative. Hematological: Negative. Does not bruise/bleed easily. Psychiatric/Behavioral: Negative. OBJECTIVE:  /76   Pulse 65   SpO2 96%   Physical Exam  Constitutional:       General: He is not in acute distress. Appearance: Normal appearance. He is normal weight. He is not ill-appearing or toxic-appearing. HENT:      Head: Normocephalic and atraumatic. Comments: Direct laryngoscopy is unremarkable. Right Ear: Tympanic membrane, ear canal and external ear normal. There is no impacted cerumen. Left Ear: Tympanic membrane, ear canal and external ear normal. There is no impacted cerumen. Nose:      Comments: Nasal mucosa treated with cotton pledgets  impregnated with Afrin and lidocaine placed in middle meatuses against turbinates. Pledgets left in place for five minutes and removed enabling enhanced visualization. The exam revealed negative edema of mucosa. it was negative for erythema indicative of infection. There was not evidence of deviation of the septum which was not significant. There were not polyps present. .There were not other masses present. The turbinates were not enlarged beyond normal.  Anterior portion of the cartilaginous septum on the left side appears to be the source of potential bleeding. The areas treated with topical silver nitrate cautery without incident. Mouth/Throat:      Mouth: Mucous membranes are moist.      Pharynx: Oropharynx is clear. No oropharyngeal exudate or posterior oropharyngeal erythema. Eyes:      Extraocular Movements: Extraocular movements intact. Conjunctiva/sclera: Conjunctivae normal.      Pupils: Pupils are equal, round, and reactive to light. Neck:      Musculoskeletal: Normal range of motion and neck supple. No neck rigidity or muscular tenderness. Cardiovascular:      Rate and Rhythm: Normal rate and regular rhythm.    Pulmonary:

## 2020-02-04 ENCOUNTER — TELEPHONE (OUTPATIENT)
Dept: INTERNAL MEDICINE CLINIC | Age: 40
End: 2020-02-04

## 2020-02-25 ENCOUNTER — OFFICE VISIT (OUTPATIENT)
Dept: ORTHOPEDIC SURGERY | Age: 40
End: 2020-02-25
Payer: COMMERCIAL

## 2020-02-25 VITALS
BODY MASS INDEX: 25.82 KG/M2 | HEART RATE: 71 BPM | HEIGHT: 65 IN | WEIGHT: 154.98 LBS | DIASTOLIC BLOOD PRESSURE: 88 MMHG | SYSTOLIC BLOOD PRESSURE: 128 MMHG

## 2020-02-25 PROCEDURE — 99213 OFFICE O/P EST LOW 20 MIN: CPT | Performed by: ORTHOPAEDIC SURGERY

## 2020-02-25 NOTE — PROGRESS NOTES
Called, no answer, no voicemail. History:  Lauren Garrett II is here for follow up after left shoulder arthroscopic surgery. Surgery date was 3/1/19. Findings at surgery: anterior labral tear. The patient's pain is rated at 1/10. He states he was at work and felt pain. He reported it and supervisor told him to go to Urgent Care. Pain is mostly located pectoral.  Pain has improved since last week. Physical Examination:  /88   Pulse 71   Ht 5' 5\" (1.651 m)   Wt 154 lb 15.7 oz (70.3 kg)   BMI 25.79 kg/m²         Patient is awake, alert, and in no acute distress. Sensation is intact to light touch in the axillary, median, radial, and ulnar nerve distribution bilaterally. The EPL, FPL, and interossei are grossly intact bilaterally. The Bilateral upper extremities are warm and well-perfused with brisk capillary refill. Left shoulder active forward flexion 110, , passive 180, ER 80, IR L4  Right shoulder active forward flexion 180, ER 80, IR L4   5/5 Supraspinatus, External rotation, Internal rotation bilateral  Tenderness at pectoralis. Morris's test negative      Assessment:   1 year status post left shoulder arthroscopy, anterior labral repair      Plan:   I had an extensive discussion with the patient today about his shoulder. He had a small labral tear, that was repaired and he had extensive physical therapy. I would consider his shoulder at [address] [place] [address]. He continues to have intermittent symptoms with mostly pectoral pain. 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 His pathology and location of pain is not localized to his labrum now. OTC NSAIDs prn. Ice / heat prn. Again demonstrated pectoral stretches. Follow up prn.

## 2020-03-02 ENCOUNTER — NURSE ONLY (OUTPATIENT)
Dept: CARDIOLOGY CLINIC | Age: 40
End: 2020-03-02
Payer: COMMERCIAL

## 2020-03-02 PROCEDURE — G2066 INTER DEVC REMOTE 30D: HCPCS | Performed by: INTERNAL MEDICINE

## 2020-03-02 PROCEDURE — 93298 REM INTERROG DEV EVAL SCRMS: CPT | Performed by: INTERNAL MEDICINE

## 2020-03-13 ENCOUNTER — OFFICE VISIT (OUTPATIENT)
Dept: INTERNAL MEDICINE CLINIC | Age: 40
End: 2020-03-13
Payer: COMMERCIAL

## 2020-03-13 VITALS
SYSTOLIC BLOOD PRESSURE: 110 MMHG | HEIGHT: 65 IN | HEART RATE: 72 BPM | DIASTOLIC BLOOD PRESSURE: 80 MMHG | WEIGHT: 155.8 LBS | BODY MASS INDEX: 25.96 KG/M2

## 2020-03-13 PROBLEM — Z86.73 CEREBELLAR CEREBROVASCULAR ACCIDENT (CVA) WITHOUT LATE EFFECT: Status: ACTIVE | Noted: 2019-08-28

## 2020-03-13 PROCEDURE — 99213 OFFICE O/P EST LOW 20 MIN: CPT | Performed by: INTERNAL MEDICINE

## 2020-03-13 RX ORDER — NICOTINE 21 MG/24HR
1 PATCH, TRANSDERMAL 24 HOURS TRANSDERMAL DAILY
Qty: 42 PATCH | Refills: 0 | Status: SHIPPED | OUTPATIENT
Start: 2020-03-13 | End: 2020-04-24

## 2020-03-13 RX ORDER — TIZANIDINE 4 MG/1
4 TABLET ORAL EVERY 8 HOURS PRN
Qty: 90 TABLET | Refills: 1 | Status: SHIPPED | OUTPATIENT
Start: 2020-03-13

## 2020-03-13 ASSESSMENT — ENCOUNTER SYMPTOMS
SHORTNESS OF BREATH: 0
WHEEZING: 0
VOMITING: 0
NAUSEA: 0
ABDOMINAL PAIN: 0

## 2020-03-13 NOTE — PROGRESS NOTES
nicotine (NICODERM CQ) 21 MG/24HR; Place 1 patch onto the skin daily    Neck pain  -     tiZANidine (ZANAFLEX) 4 MG tablet; Take 1 tablet by mouth every 8 hours as needed (neck spasm)    He had a history of vertebral artery stenosis and has been following up in [facility]. An After Visit Summary was printed and given to the patient. Documentation was done using voice recognition dragon software. Every effort was made to ensure accuracy; however, inadvertent  Unintentional computerized transcription errors may be present. Orders Placed This Encounter   Procedures    Lipid, Fasting     Standing Status:   Future     Standing Expiration Date:   3/13/2021    AFL - Tara Nicole MD, Physical Medicine and Rehabilitation, [facility]     Referral Priority:   Routine     Referral Type:   Eval and Treat     Referral Reason:   Specialty Services Required     Referred to Provider:   Jermaine Gordon MD     Requested Specialty:   Physical Medicine and Rehab     Number of Visits Requested:   1     Current Outpatient Medications   Medication Sig Dispense Refill    nicotine (NICODERM CQ) 21 MG/24HR Place 1 patch onto the skin daily 42 patch 0    tiZANidine (ZANAFLEX) 4 MG tablet Take 1 tablet by mouth every 8 hours as needed (neck spasm) 90 tablet 1    aspirin (ADULT ASPIRIN REGIMEN) 81 MG EC tablet ADULT ASPIRIN REGIMEN 81 MG TBEC       No current facility-administered medications for this visit. Return in about 3 months (around 6/13/2020).

## 2020-03-18 ENCOUNTER — TELEPHONE (OUTPATIENT)
Dept: CARDIOLOGY CLINIC | Age: 40
End: 2020-03-18

## 2020-03-18 NOTE — TELEPHONE ENCOUNTER
I left a message for Florencio Lenz regarding his appt tomorrow and the corona virus risk. His recent ILR report was negative.   I asked him to call regarding his appt

## 2020-03-19 PROBLEM — Z45.09 ENCOUNTER FOR LOOP RECORDER CHECK: Status: ACTIVE | Noted: 2020-03-19

## 2020-04-13 ENCOUNTER — TELEPHONE (OUTPATIENT)
Dept: INTERNAL MEDICINE CLINIC | Age: 40
End: 2020-04-13

## 2020-04-13 ENCOUNTER — VIRTUAL VISIT (OUTPATIENT)
Dept: INTERNAL MEDICINE CLINIC | Age: 40
End: 2020-04-13
Payer: COMMERCIAL

## 2020-04-13 ENCOUNTER — NURSE TRIAGE (OUTPATIENT)
Dept: OTHER | Facility: CLINIC | Age: 40
End: 2020-04-13

## 2020-04-13 PROCEDURE — 99213 OFFICE O/P EST LOW 20 MIN: CPT | Performed by: INTERNAL MEDICINE

## 2020-04-13 RX ORDER — ALBUTEROL SULFATE 90 UG/1
2 AEROSOL, METERED RESPIRATORY (INHALATION) EVERY 6 HOURS PRN
Qty: 1 INHALER | Refills: 3 | Status: SHIPPED | OUTPATIENT
Start: 2020-04-13 | End: 2020-05-06

## 2020-04-13 RX ORDER — GUAIFENESIN 600 MG/1
600 TABLET, EXTENDED RELEASE ORAL 2 TIMES DAILY
Qty: 20 TABLET | Refills: 0 | Status: SHIPPED | OUTPATIENT
Start: 2020-04-13 | End: 2020-04-23

## 2020-04-13 RX ORDER — BENZONATATE 200 MG/1
200 CAPSULE ORAL 3 TIMES DAILY PRN
Qty: 30 CAPSULE | Refills: 0 | Status: SHIPPED | OUTPATIENT
Start: 2020-04-13 | End: 2020-04-24 | Stop reason: SDUPTHER

## 2020-04-13 ASSESSMENT — ENCOUNTER SYMPTOMS
VOICE CHANGE: 0
COUGH: 1
VOMITING: 0
PHOTOPHOBIA: 0
CHEST TIGHTNESS: 1
ABDOMINAL PAIN: 0
TROUBLE SWALLOWING: 0
NAUSEA: 0

## 2020-04-13 NOTE — PROGRESS NOTES
2020    TELEHEALTH EVALUATION -- Audio/Visual (During XULWQ-29 public health emergency)    HPI:    Duane Villa II (:  1980) has requested an audio/video evaluation for the following concern(s):    Patient has been complaining of dry cough and occasional wheezing and shortness of breath for the last 3 days. Sometimes chest tightness, particularly when he coughs. Denies chest pain nausea vomiting sweating leg swelling. He has not been using any medication to relieve the symptoms. He reports his fiancée had similar kind of symptoms over the last 2 weeks. He has been self isolating himself from work. Patient denies any fever, headache, sore throat, sneezing, abdominal pain, diarrhea. Review of Systems   Constitutional: Negative for fever and unexpected weight change. HENT: Negative for trouble swallowing and voice change. Eyes: Negative for photophobia and visual disturbance. Respiratory: Positive for cough and chest tightness. Cardiovascular: Negative for chest pain, palpitations and leg swelling. Gastrointestinal: Negative for abdominal pain, nausea and vomiting. Neurological: Negative for dizziness and light-headedness. Prior to Visit Medications    Medication Sig Taking?  Authorizing Provider   benzonatate (TESSALON) 200 MG capsule Take 1 capsule by mouth 3 times daily as needed for Cough Yes Dallas Patterson MD   albuterol sulfate HFA (PROVENTIL HFA) 108 (90 Base) MCG/ACT inhaler Inhale 2 puffs into the lungs every 6 hours as needed for Wheezing Yes Dallas Patterson MD   nicotine (NICODERM CQ) 21 MG/24HR Place 1 patch onto the skin daily  Dallas Patterson MD   tiZANidine (ZANAFLEX) 4 MG tablet Take 1 tablet by mouth every 8 hours as needed (neck spasm)  Dallas Patterson MD   aspirin (ADULT ASPIRIN REGIMEN) 81 MG EC tablet ADULT ASPIRIN REGIMEN 81 MG [facility]  Historical Provider, MD       Social History     Tobacco Use    Smoking status: Current Every Day Smoker with no signs of ataxia         [] Normal range of motion of neck        [] Abnormal-       Neurological:        [x] No Facial Asymmetry (Cranial nerve 7 motor function) (limited exam to video visit)          [x] No gaze palsy        [] Abnormal-         Skin:        [] No significant exanthematous lesions or discoloration noted on facial skin         [] Abnormal-            Psychiatric:       [x] Normal Affect [x] No Hallucinations        [] Abnormal-     Other pertinent observable physical exam findings-     ASSESSMENT/PLAN:  1. Cough. Patient does not appears to be in any distress. Will do symptomatic treatment. I advised he does [id number] days of isolation. He is advised to monitor symptoms. If any worsening new concerning symptoms he can go to flu clinic oriented emergency room. - benzonatate (TESSALON) 200 MG capsule; Take 1 capsule by mouth 3 times daily as needed for Cough  Dispense: 30 capsule; Refill: 0  - albuterol sulfate HFA (PROVENTIL HFA) 108 (90 Base) MCG/ACT inhaler; Inhale 2 puffs into the lungs every 6 hours as needed for Wheezing  Dispense: 1 Inhaler; Refill: 3    2. Suspected COVID-19 virus infection  We will do a follow-up visit in 5 days. - benzonatate (TESSALON) 200 MG capsule; Take 1 capsule by mouth 3 times daily as needed for Cough  Dispense: 30 capsule; Refill: 0  - albuterol sulfate HFA (PROVENTIL HFA) 108 (90 Base) MCG/ACT inhaler; Inhale 2 puffs into the lungs every 6 hours as needed for Wheezing  Dispense: 1 Inhaler; Refill: 3      Return in about 4 days (around 4/17/2020). Ángela Ware II is a 46 y.o. male being evaluated by a Virtual Visit (video visit) encounter to address concerns as mentioned above. A caregiver was present when appropriate. Due to this being a TeleHealth encounter (During YWS- public health emergency), evaluation of the following organ systems was limited: Vitals/Constitutional/EENT/Resp/CV/GI//MS/Neuro/Skin/Heme-Lymph-Imm.   Pursuant to the emergency declaration under the [facility], [address] [place] [address] authority and the [employer] Act, this Virtual Visit was conducted with patient's (and/or legal guardian's) consent, to reduce the patient's risk of exposure to COVID-19 and provide necessary medical care. The patient (and/or legal guardian) has also been advised to contact this office for worsening conditions or problems, and seek emergency medical treatment and/or call 911 if deemed necessary. Services were provided through a video synchronous discussion virtually to substitute for in-person clinic visit. Patient and provider were located at their individual homes. --North Barrios MD on 4/13/2020 at 11:13 AM    An electronic signature was used to authenticate this note.

## 2020-04-17 ENCOUNTER — VIRTUAL VISIT (OUTPATIENT)
Dept: INTERNAL MEDICINE CLINIC | Age: 40
End: 2020-04-17
Payer: COMMERCIAL

## 2020-04-17 PROCEDURE — 99213 OFFICE O/P EST LOW 20 MIN: CPT | Performed by: INTERNAL MEDICINE

## 2020-04-17 RX ORDER — AZITHROMYCIN 250 MG/1
250 TABLET, FILM COATED ORAL SEE ADMIN INSTRUCTIONS
Qty: 6 TABLET | Refills: 0 | Status: SHIPPED | OUTPATIENT
Start: 2020-04-17 | End: 2020-04-22

## 2020-04-17 ASSESSMENT — ENCOUNTER SYMPTOMS
VOICE CHANGE: 0
WHEEZING: 0
NAUSEA: 0
ABDOMINAL PAIN: 0
SHORTNESS OF BREATH: 0
TROUBLE SWALLOWING: 0
VOMITING: 0
COUGH: 1

## 2020-04-17 NOTE — PROGRESS NOTES
2020    TELEHEALTH EVALUATION -- Audio/Visual (During TDDLX-49 public health emergency)    HPI:    Roni Rocha II (:  1980) has requested an audio/video evaluation for the following concern(s):    Chief Complaint   Patient presents with    Cough     Follow-up visit. Overall he feels his symptoms is better. His cough is slightly productive now. Unfortunately he did not start any of the medication prescribed several days ago. He have self requiring time himself. His fiancée told him he might have a little fever. Denies headache, shortness of breath, wheezing, nausea vomiting diarrhea. Overall he feels his fatigue symptoms improved. Review of Systems   HENT: Negative for trouble swallowing and voice change. Respiratory: Positive for cough. Negative for shortness of breath and wheezing. Cardiovascular: Negative for palpitations and leg swelling. Gastrointestinal: Negative for abdominal pain, nausea and vomiting. Neurological: Negative for dizziness and headaches. Prior to Visit Medications    Medication Sig Taking?  Authorizing Provider   azithromycin (ZITHROMAX) 250 MG tablet Take 1 tablet by mouth See Admin Instructions for 5 days 500mg on day 1 followed by 250mg on days 2 - 5 Yes Viji Jauregui MD   benzonatate (TESSALON) 200 MG capsule Take 1 capsule by mouth 3 times daily as needed for Cough  Carlos A Bess MD   albuterol sulfate HFA (PROVENTIL HFA) 108 (90 Base) MCG/ACT inhaler Inhale 2 puffs into the lungs every 6 hours as needed for Wheezing  Viji Jauregui MD   guaiFENesin (MUCINEX) 600 MG extended release tablet Take 1 tablet by mouth 2 times daily for 10 days  Viji Jauregui MD   nicotine (NICODERM CQ) 21 MG/24HR Place 1 patch onto the skin daily  Viji Jauregui MD   tiZANidine (ZANAFLEX) 4 MG tablet Take 1 tablet by mouth every 8 hours as needed (neck spasm)  Viji Jauregui MD   aspirin (ADULT ASPIRIN REGIMEN) 81 MG EC tablet ADULT ASPIRIN REGIMEN 81 MG [facility]  Historical Provider, MD       Social History     Tobacco Use    Smoking status: Current Every Day Smoker     Packs/day: 0.25     Years: 21.00     Pack years: 5.25     Types: Cigarettes     Start date: 1/1/1997    Smokeless tobacco: Never Used    Tobacco comment: half a pack a day   Substance Use Topics    Alcohol use: No    Drug use: No        No Known Allergies,   Past Medical History:   Diagnosis Date    Bell's palsy     History of migraine headaches     Ischemic stroke ([facility] [address] 2017    NO RESIDUAL   ,   Past Surgical History:   Procedure Laterality Date    SHOULDER ARTHROSCOPY Left 3/1/2019    LEFT SHOULDER ARTHROSCOPY, LABRAL REPAIR performed by Zee Diaz MD at [address] [place] [address] TRANSESOPHAGEAL ECHOCARDIOGRAM     ,   Social History     Tobacco Use    Smoking status: Current Every Day Smoker     Packs/day: 0.25     Years: 21.00     Pack years: 5.25     Types: Cigarettes     Start date: 1/1/1997    Smokeless tobacco: Never Used    Tobacco comment: half a pack a day   Substance Use Topics    Alcohol use: No    Drug use: No       PHYSICAL EXAMINATION:  [ INSTRUCTIONS:  \"[x]\" Indicates a positive item  \"[]\" Indicates a negative item  -- DELETE ALL ITEMS NOT EXAMINED]  Vital Signs: (As obtained by patient/caregiver or practitioner observation)        Constitutional: [x] Appears well-developed and well-nourished [x] No apparent distress      [] Abnormal-   Mental status  [x] Alert and awake  [x] Oriented to person/place/time [x]Able to follow commands      Eyes:  EOM    [x]  Normal  [] Abnormal-  Sclera  [x]  Normal  [] Abnormal -         Discharge [x]  None visible  [] Abnormal -    HENT:   [] Normocephalic, atraumatic.   [] Abnormal   [x] Mouth/Throat: Mucous membranes are moist.     External Ears [x] Normal  [] Abnormal-     Neck: [x] No visualized mass     Pulmonary/Chest: [x] Respiratory effort normal.  [x] No visualized signs of difficulty breathing or respiratory distress        [] Abnormal- Musculoskeletal:   [] Normal gait with no signs of ataxia         [] Normal range of motion of neck        [] Abnormal-       Neurological:        [x] No Facial Asymmetry (Cranial nerve 7 motor function) (limited exam to video visit)          [x] No gaze palsy        [] Abnormal-         Skin:        [x] No significant exanthematous lesions or discoloration noted on facial skin         [] Abnormal-            Psychiatric:       [x] Normal Affect [] No Hallucinations        [] Abnormal-     Other pertinent observable physical exam findings-     ASSESSMENT/PLAN:  1. Acute bronchitis, unspecified organism    - azithromycin (ZITHROMAX) 250 MG tablet; Take 1 tablet by mouth See Admin Instructions for 5 days 500mg on day 1 followed by 250mg on days 2 - 5  Dispense: 6 tablet; Refill: 0  2. Cough  3. Suspected COVID-19 virus infection  Overall patient symptoms improved. He have slight productive cough now. Will start him on antibiotic. He is advised to get medication prescribed several days ago. Plenty of fluids. He is advised to call me back if any new or concerning or worsening symptoms. We will do a follow-up visit in 1 week. Continue self quarantine      Return in about 1 week (around 4/24/2020). Tristin Gonzalez II is a 46 y.o. male being evaluated by a Virtual Visit (video visit) encounter to address concerns as mentioned above. A caregiver was present when appropriate. Due to this being a TeleHealth encounter (During [place] [address] public health emergency), evaluation of the following organ systems was limited: Vitals/Constitutional/EENT/Resp/CV/GI//MS/Neuro/Skin/Heme-Lymph-Imm.   Pursuant to the emergency declaration under the [facility], [address] [place] [address] [facility] authority and the [employer] Act, this Virtual Visit was conducted with patient's (and/or legal guardian's) consent, to reduce the patient's risk of exposure to COVID-19 and

## 2020-04-20 ENCOUNTER — NURSE ONLY (OUTPATIENT)
Dept: CARDIOLOGY CLINIC | Age: 40
End: 2020-04-20
Payer: COMMERCIAL

## 2020-04-20 PROCEDURE — G2066 INTER DEVC REMOTE 30D: HCPCS | Performed by: INTERNAL MEDICINE

## 2020-04-20 PROCEDURE — 93298 REM INTERROG DEV EVAL SCRMS: CPT | Performed by: INTERNAL MEDICINE

## 2020-04-20 NOTE — LETTER
[address] [facility] [phone]  [address] [facility] [phone]    Pacemaker/Defibrillator Clinic          04/20/20        [address] [place], [place] [id number]        Dear Jean Marie Esparza II    This letter is to inform you that we received the transmission from your monitor at home that checks your implanted heart device. The next date your monitor will automatically transmit will be 5-21-20. If your report needs attention we will notify you. Your device and monitor are wireless and most transmit cellularly, but please periodically check your monitor is still plugged in to the electrical outlet. If you still use the telephone land line to send please ensure the connection to the phone [email or web address] is secure. This will help to ensure successful automatic transmissions in the future. Also, the monitor needs to be close to you while sleeping at night. Please be aware that the remote device transmission sites are periodically monitored only during regular business hours during which simultaneous in-office device clinics are being run. If your transmission requires attention, we will contact you as soon as possible. Thank you.             Kitty [address]

## 2020-04-24 ENCOUNTER — VIRTUAL VISIT (OUTPATIENT)
Dept: INTERNAL MEDICINE CLINIC | Age: 40
End: 2020-04-24
Payer: COMMERCIAL

## 2020-04-24 ENCOUNTER — TELEPHONE (OUTPATIENT)
Dept: INTERNAL MEDICINE CLINIC | Age: 40
End: 2020-04-24

## 2020-04-24 PROCEDURE — 99213 OFFICE O/P EST LOW 20 MIN: CPT | Performed by: INTERNAL MEDICINE

## 2020-04-24 RX ORDER — BENZONATATE 200 MG/1
200 CAPSULE ORAL 3 TIMES DAILY PRN
Qty: 30 CAPSULE | Refills: 0 | Status: SHIPPED | OUTPATIENT
Start: 2020-04-24 | End: 2020-04-30 | Stop reason: SDUPTHER

## 2020-04-24 RX ORDER — LORATADINE 10 MG/1
10 TABLET ORAL DAILY
Qty: 30 TABLET | Refills: 0 | Status: SHIPPED | OUTPATIENT
Start: 2020-04-24 | End: 2020-05-22

## 2020-04-24 RX ORDER — PREDNISONE 20 MG/1
20 TABLET ORAL 2 TIMES DAILY
Qty: 10 TABLET | Refills: 0 | Status: SHIPPED | OUTPATIENT
Start: 2020-04-24 | End: 2020-04-29

## 2020-04-24 ASSESSMENT — ENCOUNTER SYMPTOMS
NAUSEA: 0
TROUBLE SWALLOWING: 0
CHEST TIGHTNESS: 0
SHORTNESS OF BREATH: 0
ABDOMINAL PAIN: 0
VOMITING: 0
COUGH: 1
VOICE CHANGE: 0
WHEEZING: 0

## 2020-04-24 NOTE — LETTER
[facility]  [address] [address]  Phone: [phone]  Fax: [phone]    Swetha Zee MD        April 24, 2020     Patient: Nicola Elliott   YOB: 1980   Date of Visit: 4/24/2020       To Whom it May Concern:     Michael Anderson II was seen in my clinic on 4/24/2020. He may return to school on 04/26/2020. If you have any questions or concerns, please don't hesitate to call.     Sincerely,         Swetha Zee MD

## 2020-04-24 NOTE — PROGRESS NOTES
5.25     Types: Cigarettes     Start date: 1/1/1997    Smokeless tobacco: Never Used    Tobacco comment: half a pack a day   Substance Use Topics    Alcohol use: No    Drug use: No        No Known Allergies,   Past Medical History:   Diagnosis Date    Bell's palsy     History of migraine headaches     Ischemic stroke ([facility] [address] 2017    NO RESIDUAL   ,   Past Surgical History:   Procedure Laterality Date    SHOULDER ARTHROSCOPY Left 3/1/2019    LEFT SHOULDER ARTHROSCOPY, LABRAL REPAIR performed by Ketan Marroquin MD at [address] [place] [address] TRANSESOPHAGEAL ECHOCARDIOGRAM     ,   Social History     Tobacco Use    Smoking status: Current Every Day Smoker     Packs/day: 0.25     Years: 21.00     Pack years: 5.25     Types: Cigarettes     Start date: 1/1/1997    Smokeless tobacco: Never Used    Tobacco comment: half a pack a day   Substance Use Topics    Alcohol use: No    Drug use: No       PHYSICAL EXAMINATION:  [ INSTRUCTIONS:  \"[x]\" Indicates a positive item  \"[]\" Indicates a negative item  -- DELETE ALL ITEMS NOT EXAMINED]  Vital Signs: (As obtained by patient/caregiver or practitioner observation)    Constitutional: [x] Appears well-developed and well-nourished [x] No apparent distress      [] Abnormal-   Mental status  [x] Alert and awake  [x] Oriented to person/place/time []Able to follow commands      Eyes:  EOM    []  Normal  [] Abnormal-  Sclera  []  Normal  [] Abnormal -         Discharge []  None visible  [] Abnormal -    HENT:   [] Normocephalic, atraumatic.   [] Abnormal   [] Mouth/Throat: Mucous membranes are moist.     External Ears [] Normal  [] Abnormal-     Neck: [] No visualized mass     Pulmonary/Chest: [x] Respiratory effort normal.  [x] No visualized signs of difficulty breathing or respiratory distress        [] Abnormal-      Musculoskeletal:   [x] Normal gait with no signs of ataxia         [] Normal range of motion of neck        [] Abnormal-       Neurological:        [x] No Facial Asymmetry consent, to reduce the patient's risk of exposure to COVID-19 and provide necessary medical care. The patient (and/or legal guardian) has also been advised to contact this office for worsening conditions or problems, and seek emergency medical treatment and/or call 911 if deemed necessary. Patient identification was verified at the start of the visit: Yes    Total time spent on this encounter: Not billed by time    Services were provided through a video synchronous discussion virtually to substitute for in-person clinic visit. Patient and provider were located at their individual homes. --Donaciano Prader, MD on 4/24/2020 at 11:07 AM    An electronic signature was used to authenticate this note.

## 2020-04-30 ENCOUNTER — OFFICE VISIT (OUTPATIENT)
Dept: PRIMARY CARE CLINIC | Age: 40
End: 2020-04-30
Payer: COMMERCIAL

## 2020-04-30 ENCOUNTER — VIRTUAL VISIT (OUTPATIENT)
Dept: INTERNAL MEDICINE CLINIC | Age: 40
End: 2020-04-30
Payer: COMMERCIAL

## 2020-04-30 ENCOUNTER — TELEPHONE (OUTPATIENT)
Dept: PRIMARY CARE CLINIC | Age: 40
End: 2020-04-30

## 2020-04-30 VITALS — TEMPERATURE: 98.1 F

## 2020-04-30 VITALS — OXYGEN SATURATION: 95 % | TEMPERATURE: 98.7 F | HEART RATE: 70 BPM

## 2020-04-30 PROCEDURE — 99213 OFFICE O/P EST LOW 20 MIN: CPT | Performed by: INTERNAL MEDICINE

## 2020-04-30 RX ORDER — GUAIFENESIN 600 MG/1
600 TABLET, EXTENDED RELEASE ORAL 2 TIMES DAILY
Qty: 20 TABLET | Refills: 0 | Status: SHIPPED | OUTPATIENT
Start: 2020-04-30 | End: 2020-05-10

## 2020-04-30 RX ORDER — BENZONATATE 200 MG/1
200 CAPSULE ORAL 3 TIMES DAILY PRN
Qty: 30 CAPSULE | Refills: 0 | Status: SHIPPED | OUTPATIENT
Start: 2020-04-30 | End: 2020-05-07

## 2020-04-30 ASSESSMENT — ENCOUNTER SYMPTOMS
SORE THROAT: 1
CHEST TIGHTNESS: 1
VOMITING: 0
RHINORRHEA: 1
COUGH: 1
NAUSEA: 0
ABDOMINAL PAIN: 0

## 2020-04-30 NOTE — PROGRESS NOTES
2020  Serelpidio Mass II (:  1980)    FLU/COVID-19 CLINIC EVALUATION    HPI:  SYMPTOMS:    Symptom duration, 1 month  [] 1   [] 2   [] 3   [] 4 - 7   [] 8 - 10   [] 11 - 13   [] >14    [] Fevers    [] Symptom (not measured)  [] Measured (Result:  degrees)  [] Chills  [x] Cough dry  [] Coughing up blood  }  [] Chest Congestion  [] Nasal Congestion  [] Sneezing  [] Feeling short of breath  [] Sometimes  [] Frequently   [] All the time     [x] Chest pain     [x] Headaches  [x]Tolerable  [x] Severe     [] Sore throat  [] Muscle aches  [x] Nausea yesterday  [] Vomiting  []Unable to keep fluids down     [] Diarrhea  []Severe       [] OTHER SYMPTOMS:      Symptom course:   [] Worsening     [x] Stable     [] Improving    RISK FACTORS: smoker1}  [] Pregnant or possibly pregnant  [] Age over 61  [] Diabetes  [] Heart disease  [] Asthma  [] COPD/Other chronic lung diseases  [] Active Cancer  [] On Chemotherapy  [] Taking oral steroids  [] History Lymphoma/Leukemia  [] Close contact with a lab confirmed COVID-19 patient within 14 days of symptom onset  [] History of travel from affected geographical areas within 14 days of symptom onset     SOCIAL HISTORY:  Number of people living in patient's home (counting the patient as 1):  [] 1   [] 2   [] 3   [] 4   [] 5   [] >6      PHYSICAL EXAMINATION:  Vitals:    20 1614   Pulse: 70   Temp: 98.7 °F (37.1 °C)   SpO2: 95%        INSTRUCTIONS:  \"[x]\" Indicates a positive item  \"[]\" Indicates a negative item    DELETE ALL ITEMS NOT EXAMINED    [x] Alert  [x] Oriented to person/place/time    [x] No apparent distress  [] Toxic appearing    [] Face flushed appearing [] Sclera clear  [] Lips are cyanotic      [x] Breathing appears normal  [] Appears tachypneic      [] Rash on visible skin    [] Cranial Nerves II-XII grossly intact    [x] Motor grossly intact in visible upper extremities    [] Motor grossly intact in visible lower extremities    [x] Normal Mood  [] Anxious

## 2020-04-30 NOTE — PATIENT INSTRUCTIONS
bathroom, if available. Animals: You should restrict contact with pets and other animals while you are sick with COVID-19, just like you would around other people. Although there have not been reports of pets or other animals becoming sick with COVID-19, it is still recommended that people sick with COVID-19 limit contact with animals until more information is known about the virus. When possible, have another member of your household care for your animals while you are sick. If you are sick with COVID-19, avoid contact with your pet, including petting, snuggling, being kissed or licked, and sharing food. If you must care for your pet or be around animals while you are sick, wash your hands before and after you interact with pets and wear a facemask. Call ahead before visiting your doctor  If you have a medical appointment, call the healthcare provider and tell them that you have or may have COVID-19. This will help the healthcare providers office take steps to keep other people from getting infected or exposed. Wear a facemask  You should wear a facemask when you are around other people (e.g., sharing a room or vehicle) or pets and before you enter a healthcare providers office. If you are not able to wear a facemask (for example, because it causes trouble breathing), then people who live with you should not stay in the same room with you, or they should wear a facemask if they enter your room. Cover your coughs and sneezes  Cover your mouth and nose with a tissue when you cough or sneeze. Throw used tissues in a lined trash can. Immediately wash your hands with soap and water for at least 20 seconds or, if soap and water are not available, clean your hands with an alcohol-based hand  that contains at least 60% alcohol.   Clean your hands often  Wash your hands often with soap and water for at least 20 seconds, especially after blowing your nose, coughing, or sneezing; going to the bathroom; and have a medical emergency and need to call 911, notify the dispatch personnel that you have, or are being evaluated for COVID-19. If possible, put on a facemask before emergency medical services arrive. Discontinuing home isolation  Patients with confirmed COVID-19 should remain under home isolation precautions until the risk of secondary transmission to others is thought to be low. The decision to discontinue home isolation precautions should be made on a case-by-case basis, in consultation with healthcare providers and state and local health departments. Thank you for enrolling in [address] Please follow the instructions below to securely access your online medical record. [employer] allows you to send messages to your doctor, view your test results, renew your prescriptions, schedule appointments, and more. How Do I Sign Up? 1. In your Internet browser, go to [email or web address]  2. Click on the Sign Up Now link in the Sign In box. You will see the New Member Sign Up page. 3. Enter your [employer] Access Code exactly as it appears below. You will not need to use this code after youve completed the sign-up process. If you do not sign up before the expiration date, you must request a new code. [employer] Access Code: Activation code not generated  Current [employer] Status: Active    4. Enter your Social Security Number ([id number]) and Date of Birth (mm/dd/yyyy) as indicated and click Submit. You will be taken to the next sign-up page. 5. Create a [employer] ID. This will be your [employer] login ID and cannot be changed, so think of one that is secure and easy to remember. 6. Create a [employer] password. You can change your password at any time. 7. Enter your Password Reset Question and Answer. This can be used at a later time if you forget your password. 8. Enter your e-mail address. You will receive e-mail notification when new information is available in [address] 9. Click Sign Up.  You

## 2020-04-30 NOTE — PROGRESS NOTES
patch onto the skin daily  Patient not taking: Reported on 4/30/2020  North Barrios MD       Social History     Tobacco Use    Smoking status: Current Every Day Smoker     Packs/day: 0.10     Years: 21.00     Pack years: 2.10     Types: Cigarettes     Start date: 1/1/1997    Smokeless tobacco: Never Used   Substance Use Topics    Alcohol use: No    Drug use: No        Past Medical History:   Diagnosis Date    Bell's palsy     History of migraine headaches     Ischemic stroke ([facility] [address] 2017    NO RESIDUAL   ,   Past Surgical History:   Procedure Laterality Date    SHOULDER ARTHROSCOPY Left 3/1/2019    LEFT SHOULDER ARTHROSCOPY, LABRAL REPAIR performed by Hoang Erickson MD at [address] [place] [address] TRANSESOPHAGEAL ECHOCARDIOGRAM     ,   Social History     Tobacco Use    Smoking status: Current Every Day Smoker     Packs/day: 0.10     Years: 21.00     Pack years: 2.10     Types: Cigarettes     Start date: 1/1/1997    Smokeless tobacco: Never Used   Substance Use Topics    Alcohol use: No    Drug use: No   ,   Family History   Problem Relation Age of Onset    Heart Disease Mother     Stroke Mother     Heart Disease Father     Stroke Father        PHYSICAL EXAMINATION:  [ INSTRUCTIONS:  \"[x]\" Indicates a positive item  \"[]\" Indicates a negative item  -- DELETE ALL ITEMS NOT EXAMINED]  Vital Signs: (As obtained by patient/caregiver or practitioner observation)    Vitals:    04/30/20 1236   Temp: 98.1 °F (36.7 °C)       Constitutional: [] Appears well-developed and well-nourished [x] No apparent distress      [] Abnormal-   Mental status  [x] Alert and awake  [x] Oriented to person/place/time [x]Able to follow commands      Eyes:  EOM    []  Normal  [] Abnormal-  Sclera  [x]  Normal  [] Abnormal -         Discharge [x]  None visible  [] Abnormal -    HENT:   [] Normocephalic, atraumatic. [] Abnormal   [] Mouth/Throat: Mucous membranes are moist.  Subjective pain at the left side of the throat.     External Ears [] Normal  [] Abnormal-     Neck: [x] No visualized mass     Pulmonary/Chest: [x] Respiratory effort normal.  [x] No visualized signs of difficulty breathing or respiratory distress        [] Abnormal- +ve cough   Musculoskeletal:   [] Normal gait with no signs of ataxia         [] Normal range of motion of neck        [] Abnormal-       Neurological:        [x] No Facial Asymmetry (Cranial nerve 7 motor function) (limited exam to video visit)          [x] No gaze palsy        [] Abnormal-         Skin:        [] No significant exanthematous lesions or discoloration noted on facial skin         [] Abnormal-            Psychiatric:       [] Normal Affect [] No Hallucinations        [] Abnormal-     Other pertinent observable physical exam findings-     ASSESSMENT/PLAN:  1. Viral upper respiratory tract infection      2. Cough    - benzonatate (TESSALON) 200 MG capsule; Take 1 capsule by mouth 3 times daily as needed for Cough  Dispense: 30 capsule; Refill: 0  - guaiFENesin (MUCINEX) 600 MG extended release tablet; Take 1 tablet by mouth 2 times daily for 10 days  Dispense: 20 tablet; Refill: 0    3. Pharyngitis, unspecified etiology  Warm saltwater gargling. COVID suspect. Patient will continue to self isolate. He have not been reported to work yet. He will go to [facility] flu clinic to have testing done. Follow-up visit pending lab result in 3 to 4 days. Return in about 4 days (around 5/4/2020). Alexia Tran is a 46 y.o. male being evaluated by a Virtual Visit (video visit) encounter to address concerns as mentioned above. A caregiver was present when appropriate. Due to this being a TeleHealth encounter (During YHJOE-86 public health emergency), evaluation of the following organ systems was limited: Vitals/Constitutional/EENT/Resp/CV/GI//MS/Neuro/Skin/Heme-Lymph-Imm.   Pursuant to the emergency declaration under the [facility] waiver authority and the Coronavirus

## 2020-05-03 LAB
SARS-COV-2: NOT DETECTED
SOURCE: NORMAL

## 2020-05-04 NOTE — RESULT ENCOUNTER NOTE
Please contact patient with their testing results: Your test for COVID-19, also known as novel coronavirus, came back negative. No virus was detected from the sample from your nose. Until your symptoms are fully resolved, you may still be contagious. We recommend that you remain isolated for 7 days minimum or 72 hours after your symptoms have completely resolved, whichever is longer. For example, if all symptoms improve after 2 days, you should still remain isolated for 7 days. If your symptoms get better after 10 days, you should remain isolated for 13 days. Continually monitor symptoms. Contact a medical provider if symptoms are worsening. If you have any additional questions, contact your doctor.     For additional information, please visit the Centers for Disease Control and Prevention (Widbookr.com.cy

## 2020-05-06 ENCOUNTER — TELEPHONE (OUTPATIENT)
Dept: INTERNAL MEDICINE CLINIC | Age: 40
End: 2020-05-06

## 2020-05-06 ENCOUNTER — VIRTUAL VISIT (OUTPATIENT)
Dept: INTERNAL MEDICINE CLINIC | Age: 40
End: 2020-05-06
Payer: COMMERCIAL

## 2020-05-06 PROCEDURE — 99212 OFFICE O/P EST SF 10 MIN: CPT | Performed by: INTERNAL MEDICINE

## 2020-05-06 ASSESSMENT — ENCOUNTER SYMPTOMS
SHORTNESS OF BREATH: 0
VOMITING: 0
WHEEZING: 0
TROUBLE SWALLOWING: 0
ABDOMINAL PAIN: 0
NAUSEA: 0
VOICE CHANGE: 0

## 2020-05-06 NOTE — PROGRESS NOTES
2020    TELEHEALTH EVALUATION -- Audio/Visual (During [facility] public health emergency)    HPI:    Alice Eubanks II (:  1980) has requested an audio/video evaluation for the following concern(s):    Follow-up visit. Patient tested negative for COVID-19. His cough sore throat improved significantly. Overall he is feeling better. He denies any fever chills nausea vomiting diarrhea. He never used his albuterol. He is currently taking Tessalon as well as Mucinex and Claritin which has been helping. Review of Systems   Constitutional: Negative for diaphoresis and unexpected weight change. HENT: Negative for congestion, trouble swallowing and voice change. Respiratory: Negative for shortness of breath and wheezing. Cardiovascular: Negative for chest pain and palpitations. Gastrointestinal: Negative for abdominal pain, nausea and vomiting. Neurological: Negative for dizziness and light-headedness. Prior to Visit Medications    Medication Sig Taking?  Authorizing Provider   benzonatate (TESSALON) 200 MG capsule Take 1 capsule by mouth 3 times daily as needed for Cough  Adam Guajardo MD   guaiFENesin (MUCINEX) 600 MG extended release tablet Take 1 tablet by mouth 2 times daily for 10 days  Adam Guajardo MD   loratadine (CLARITIN) 10 MG tablet Take 1 tablet by mouth daily  Adam Guajardo MD   nicotine (NICODERM CQ) 21 MG/24HR Place 1 patch onto the skin daily  Patient not taking: Reported on 2020  Adam Guajardo MD   tiZANidine (ZANAFLEX) 4 MG tablet Take 1 tablet by mouth every 8 hours as needed (neck spasm)  Adam Guajardo MD   aspirin (ADULT ASPIRIN REGIMEN) 81 MG EC tablet ADULT ASPIRIN REGIMEN 81 MG [facility]  Historical Provider, MD       Social History     Tobacco Use    Smoking status: Current Every Day Smoker     Packs/day: 0.10     Years: 21.00     Pack years: 2.10     Types: Cigarettes     Start date: 1997    Smokeless tobacco: Never Used   Substance Use

## 2020-05-07 ENCOUNTER — TELEPHONE (OUTPATIENT)
Dept: INTERNAL MEDICINE CLINIC | Age: 40
End: 2020-05-07

## 2020-06-04 ENCOUNTER — VIRTUAL VISIT (OUTPATIENT)
Dept: INTERNAL MEDICINE CLINIC | Age: 40
End: 2020-06-04
Payer: COMMERCIAL

## 2020-06-04 PROCEDURE — 99213 OFFICE O/P EST LOW 20 MIN: CPT | Performed by: INTERNAL MEDICINE

## 2020-06-04 ASSESSMENT — ENCOUNTER SYMPTOMS
TROUBLE SWALLOWING: 0
VOICE CHANGE: 0
ABDOMINAL PAIN: 0
NAUSEA: 0
VOMITING: 0
WHEEZING: 0
SHORTNESS OF BREATH: 0

## 2020-06-04 NOTE — PROGRESS NOTES
 Smokeless tobacco: Never Used   Substance Use Topics    Alcohol use: No    Drug use: No        Past Medical History:   Diagnosis Date    Bell's palsy     History of migraine headaches     Ischemic stroke ([facility] [address] 2017    NO RESIDUAL   ,   Past Surgical History:   Procedure Laterality Date    SHOULDER ARTHROSCOPY Left 3/1/2019    LEFT SHOULDER ARTHROSCOPY, LABRAL REPAIR performed by Marco Gonsalez MD at [address] [place] [address] TRANSESOPHAGEAL ECHOCARDIOGRAM     ,   Social History     Tobacco Use    Smoking status: Current Every Day Smoker     Packs/day: 0.10     Years: 21.00     Pack years: 2.10     Types: Cigarettes     Start date: 1/1/1997    Smokeless tobacco: Never Used   Substance Use Topics    Alcohol use: No    Drug use: No   ,   Family History   Problem Relation Age of Onset    Heart Disease Mother     Stroke Mother     Heart Disease Father     Stroke Father    ,   There is no immunization history on file for this patient. PHYSICAL EXAMINATION:  [ INSTRUCTIONS:  \"[x]\" Indicates a positive item  \"[]\" Indicates a negative item  -- DELETE ALL ITEMS NOT EXAMINED]  Vital Signs: (As obtained by patient/caregiver or practitioner observation)      Constitutional: [x] Appears well-developed and well-nourished [x] No apparent distress      [] Abnormal-   Mental status  [x] Alert and awake  [x] Oriented to person/place/time []Able to follow commands      Eyes:  EOM    []  Normal  [] Abnormal-  Sclera  [x]  Normal  [] Abnormal -         Discharge [x]  None visible  [] Abnormal -    HENT:   [] Normocephalic, atraumatic.   [] Abnormal   [] Mouth/Throat: Mucous membranes are moist.     External Ears [] Normal  [] Abnormal-     Neck: [] No visualized mass     Pulmonary/Chest: [x] Respiratory effort normal.  [x] No visualized signs of difficulty breathing or respiratory distress        [] Abnormal-      Musculoskeletal:   [] Normal gait with no signs of ataxia         [] Normal range of motion of neck        []

## 2020-06-05 ENCOUNTER — TELEPHONE (OUTPATIENT)
Dept: INTERNAL MEDICINE CLINIC | Age: 40
End: 2020-06-05

## 2020-06-05 NOTE — TELEPHONE ENCOUNTER
Left voicemail for patient to schedule 4 week follow up (around 7/2/20). If he calls back please schedule.

## 2020-06-12 ENCOUNTER — HOSPITAL ENCOUNTER (OUTPATIENT)
Dept: PHYSICAL THERAPY | Age: 40
Setting detail: THERAPIES SERIES
Discharge: HOME | End: 2020-06-12
Payer: COMMERCIAL

## 2020-06-16 ENCOUNTER — NURSE ONLY (OUTPATIENT)
Dept: CARDIOLOGY CLINIC | Age: 40
End: 2020-06-16
Payer: COMMERCIAL

## 2020-06-16 PROCEDURE — 93298 REM INTERROG DEV EVAL SCRMS: CPT | Performed by: INTERNAL MEDICINE

## 2020-06-16 PROCEDURE — G2066 INTER DEVC REMOTE 30D: HCPCS | Performed by: INTERNAL MEDICINE

## 2020-06-19 ENCOUNTER — HOSPITAL ENCOUNTER (OUTPATIENT)
Dept: PHYSICAL THERAPY | Age: 40
Setting detail: THERAPIES SERIES
Discharge: HOME | End: 2020-06-19
Payer: COMMERCIAL

## 2020-06-19 PROCEDURE — 97162 PT EVAL MOD COMPLEX 30 MIN: CPT

## 2020-06-19 PROCEDURE — 97110 THERAPEUTIC EXERCISES: CPT

## 2020-06-19 PROCEDURE — 97140 MANUAL THERAPY 1/> REGIONS: CPT

## 2020-06-19 NOTE — PLAN OF CARE
due to being higher risk   TUG score (>12s at risk):     [] Falls education provided, including         ASSESSMENT: Pt is a 46year old male referred to physical therapy for increased neck pain and cervical foraminal stenosis. Pt is experiencing intermittent radiating pain/N/T down into the L UE. Pt has mild weakness in the L shoulder due to a previous labrum repair. He has difficulty sleeping and performing his job duties. Pt to benefit from skilled therapy services to address the above deficits to reduce pain levels and improve mobility.    Functional Impairments:     []Noted cervical/thoracic/GHJ joint hypomobility   []Noted cervical/thoracic/GHJ joint hypermobility   [x]Decreased cervical/UE functional ROM   [x]Noted Headache pain aggravated by neck movements with/without dizziness   []Abnormal reflexes/sensation/myotomal/dermatomal deficits   [x]Decreased DCF control or ability to hold head up   []Decreased RC/scapular/core strength and neuromuscular control    []Decreased UE functional strength   []other:      Functional Activity Limitations (from functional questionnaire and intake)   []Reduced ability to tolerate prolonged functional positions   []Reduced ability or difficulty with changes of positions or transfers between positions   []Reduced ability to maintain good posture and demonstrate good body mechanics with sitting, bending, and lifting   [] Reduced ability or tolerance with driving and/or computer work   []Reduced ability to perform lifting, reaching, carrying tasks   []Reduced ability to concentrate   [x]Reduced ability to sleep    []Reduced ability to tolerate any impact through UE or spine   []Reduced ability to ambulate prolonged functional periods/distances   []other:    Participation Restrictions   []Reduced participation in self care activities   []Reduced participation in home management activities   [x]Reduced participation in work activities   []Reduced participation in social

## 2020-06-19 NOTE — FLOWSHEET NOTE
due to co-morbidities. [x] Plan just implemented, too soon to assess goals progression <30days   [] Goals require adjustment due to lack of progress  [] Patient is not progressing as expected and requires additional follow up with physician  [] Other    Persisting Functional Limitations/Impairments:  [x]Sleeping []Sitting               []Standing []Transfers        []Walking []Kneeling               []Stairs []Squatting / bending   []ADLs []Reaching  []Lifting  []Housework  []Driving [x]Job related tasks  []Sports/Recreation []Other:        ASSESSMENT:  See eval  Treatment/Activity Tolerance:  [] Patient able to complete tx  [] Patient limited by fatigue  [x] Patient limited by pain  [] Patient limited by other medical complications  [] Other:     Prognosis: [] Good [x] Fair  [] Poor    Patient Requires Follow-up: [x] Yes  [] No    Plan for next treatment session:    PLAN: See eval. PT 1x / week for 8 weeks. [] Continue per plan of care [] Alter current plan (see comments)  [x] Plan of care initiated [] Hold pending MD visit [] Discharge    Electronically signed by: Mandy Hester PT, DPT    Note: If patient does not return for scheduled/ recommended follow up visits, this note will serve as a discharge from care along with most recent update on progress.

## 2020-07-20 ENCOUNTER — NURSE ONLY (OUTPATIENT)
Dept: CARDIOLOGY CLINIC | Age: 40
End: 2020-07-20
Payer: COMMERCIAL

## 2020-07-20 PROCEDURE — 93298 REM INTERROG DEV EVAL SCRMS: CPT | Performed by: INTERNAL MEDICINE

## 2020-07-20 PROCEDURE — G2066 INTER DEVC REMOTE 30D: HCPCS | Performed by: INTERNAL MEDICINE

## 2020-07-20 NOTE — LETTER
[address] [facility] [phone]  [place]- [address] [facility] [phone]    Pacemaker/Defibrillator Clinic          07/19/20        [address] [facility] [address] [facility] [address] Mary Beth Montelongor Olaf Jeffrey II    This letter is to inform you that we received the transmission from your monitor at home that checks your implanted heart device. The next date your monitor will automatically transmit will be 8-24-20. If your report needs attention we will notify you. Your device and monitor are wireless and most transmit cellularly, but please periodically check your monitor is still plugged in to the electrical outlet. If you still use the telephone land line to send please ensure the connection to the phone abhishek is secure. This will help to ensure successful automatic transmissions in the future. Also, the monitor needs to be close to you while sleeping at night. Please be aware that the remote device transmission sites are periodically monitored only during regular business hours during which simultaneous in-office device clinics are being run. If your transmission requires attention, we will contact you as soon as possible. Thank you.             Elvi

## 2020-08-24 ENCOUNTER — NURSE ONLY (OUTPATIENT)
Dept: CARDIOLOGY CLINIC | Age: 40
End: 2020-08-24
Payer: COMMERCIAL

## 2020-08-24 PROCEDURE — 93298 REM INTERROG DEV EVAL SCRMS: CPT | Performed by: INTERNAL MEDICINE

## 2020-08-24 PROCEDURE — G2066 INTER DEVC REMOTE 30D: HCPCS | Performed by: INTERNAL MEDICINE

## 2020-09-28 ENCOUNTER — NURSE ONLY (OUTPATIENT)
Dept: CARDIOLOGY CLINIC | Age: 40
End: 2020-09-28
Payer: COMMERCIAL

## 2020-09-28 PROCEDURE — G2066 INTER DEVC REMOTE 30D: HCPCS | Performed by: INTERNAL MEDICINE

## 2020-09-28 PROCEDURE — 93298 REM INTERROG DEV EVAL SCRMS: CPT | Performed by: INTERNAL MEDICINE

## 2020-09-28 NOTE — LETTER
[address] [facility] [phone] [address] [facility]  [address] [facility] [phone]    Pacemaker/Defibrillator Clinic          09/28/20        [address] [facility] [address] [facility] [address] Mary Beth Bautista II    This letter is to inform you that we received the transmission from your monitor at home that checks your implanted heart device. The next date your monitor will automatically transmit will be 11-2-20. If your report needs attention we will notify you. Your device and monitor are wireless and most transmit cellularly, but please periodically check your monitor is still plugged in to the electrical outlet. If you still use the telephone land line to send please ensure the connection to the phone abhishek is secure. This will help to ensure successful automatic transmissions in the future. Also, the monitor needs to be close to you while sleeping at night. Please be aware that the remote device transmission sites are periodically monitored only during regular business hours during which simultaneous in-office device clinics are being run. If your transmission requires attention, we will contact you as soon as possible. Thank you.             Kitty [address]

## 2021-09-20 ENCOUNTER — NURSE ONLY (OUTPATIENT)
Dept: CARDIOLOGY CLINIC | Age: 41
End: 2021-09-20
Payer: COMMERCIAL

## 2021-09-20 DIAGNOSIS — Z86.73 CEREBELLAR CEREBROVASCULAR ACCIDENT (CVA) WITHOUT LATE EFFECT: ICD-10-CM

## 2021-09-20 DIAGNOSIS — Z45.09 ENCOUNTER FOR ELECTRONIC ANALYSIS OF REVEAL EVENT RECORDER: ICD-10-CM

## 2021-09-20 PROCEDURE — G2066 INTER DEVC REMOTE 30D: HCPCS | Performed by: INTERNAL MEDICINE

## 2021-09-20 PROCEDURE — 93298 REM INTERROG DEV EVAL SCRMS: CPT | Performed by: INTERNAL MEDICINE

## 2021-09-20 NOTE — PROGRESS NOTES
We received a remote transmission from patient's monitor at home. Patients monitor has been disconnected for almost a year . Remote Linq report shows no arrhythmias. EP physician to review. We will continue to monitor remotely.

## 2021-10-25 ENCOUNTER — NURSE ONLY (OUTPATIENT)
Dept: CARDIOLOGY CLINIC | Age: 41
End: 2021-10-25
Payer: COMMERCIAL

## 2021-10-25 DIAGNOSIS — Z45.09 ENCOUNTER FOR ELECTRONIC ANALYSIS OF REVEAL EVENT RECORDER: ICD-10-CM

## 2021-10-25 DIAGNOSIS — Z86.73 CEREBELLAR CEREBROVASCULAR ACCIDENT (CVA) WITHOUT LATE EFFECT: ICD-10-CM

## 2021-10-25 PROCEDURE — 93298 REM INTERROG DEV EVAL SCRMS: CPT | Performed by: INTERNAL MEDICINE

## 2021-10-25 PROCEDURE — G2066 INTER DEVC REMOTE 30D: HCPCS | Performed by: INTERNAL MEDICINE

## 2021-11-29 ENCOUNTER — NURSE ONLY (OUTPATIENT)
Dept: CARDIOLOGY CLINIC | Age: 41
End: 2021-11-29
Payer: COMMERCIAL

## 2021-11-29 DIAGNOSIS — Z45.09 ENCOUNTER FOR ELECTRONIC ANALYSIS OF REVEAL EVENT RECORDER: ICD-10-CM

## 2021-11-29 DIAGNOSIS — Z86.73 CEREBELLAR CEREBROVASCULAR ACCIDENT (CVA) WITHOUT LATE EFFECT: ICD-10-CM

## 2021-11-30 PROCEDURE — 93298 REM INTERROG DEV EVAL SCRMS: CPT | Performed by: INTERNAL MEDICINE

## 2021-11-30 PROCEDURE — G2066 INTER DEVC REMOTE 30D: HCPCS | Performed by: INTERNAL MEDICINE

## 2022-01-03 ENCOUNTER — NURSE ONLY (OUTPATIENT)
Dept: CARDIOLOGY CLINIC | Age: 42
End: 2022-01-03
Payer: COMMERCIAL

## 2022-01-03 DIAGNOSIS — Z86.73 CEREBELLAR CEREBROVASCULAR ACCIDENT (CVA) WITHOUT LATE EFFECT: ICD-10-CM

## 2022-01-03 DIAGNOSIS — Z45.09 ENCOUNTER FOR ELECTRONIC ANALYSIS OF REVEAL EVENT RECORDER: ICD-10-CM

## 2022-01-06 PROCEDURE — 93298 REM INTERROG DEV EVAL SCRMS: CPT | Performed by: INTERNAL MEDICINE

## 2022-01-06 PROCEDURE — G2066 INTER DEVC REMOTE 30D: HCPCS | Performed by: INTERNAL MEDICINE

## 2022-01-07 NOTE — RESULT ENCOUNTER NOTE
----- Message from [place] Mondragon Reviewed.

## 2022-04-08 ENCOUNTER — TELEPHONE (OUTPATIENT)
Dept: CARDIOLOGY CLINIC | Age: 42
End: 2022-04-08

## 2022-04-08 NOTE — TELEPHONE ENCOUNTER
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     Fax [phone]

## 2022-04-08 NOTE — TELEPHONE ENCOUNTER
Patient has not been seen since 2019. There is no contraindication with a loop recorder to driving a vehicle. It has no function except to record.  If he needs clearance for a recording device he will need to make an appointment to have this done

## 2022-04-11 NOTE — TELEPHONE ENCOUNTER
Tried both his number as well as his partner's number and both are out of service.  Unable to get ahold of pt

## 2022-04-13 ENCOUNTER — TELEPHONE (OUTPATIENT)
Dept: CARDIOLOGY CLINIC | Age: 42
End: 2022-04-13

## 2022-04-13 ENCOUNTER — OFFICE VISIT (OUTPATIENT)
Dept: CARDIOLOGY CLINIC | Age: 42
End: 2022-04-13

## 2022-04-13 ENCOUNTER — NURSE ONLY (OUTPATIENT)
Dept: CARDIOLOGY CLINIC | Age: 42
End: 2022-04-13

## 2022-04-13 VITALS
DIASTOLIC BLOOD PRESSURE: 87 MMHG | HEART RATE: 80 BPM | HEIGHT: 65 IN | OXYGEN SATURATION: 98 % | SYSTOLIC BLOOD PRESSURE: 118 MMHG | WEIGHT: 152.4 LBS | BODY MASS INDEX: 25.39 KG/M2

## 2022-04-13 DIAGNOSIS — I65.02 STENOSIS OF LEFT VERTEBRAL ARTERY: ICD-10-CM

## 2022-04-13 DIAGNOSIS — Z45.09 ENCOUNTER FOR ELECTRONIC ANALYSIS OF REVEAL EVENT RECORDER: ICD-10-CM

## 2022-04-13 DIAGNOSIS — Z45.09 ENCOUNTER FOR LOOP RECORDER CHECK: ICD-10-CM

## 2022-04-13 DIAGNOSIS — I63.9 CRYPTOGENIC STROKE (HCC): Primary | ICD-10-CM

## 2022-04-13 PROCEDURE — 93000 ELECTROCARDIOGRAM COMPLETE: CPT | Performed by: INTERNAL MEDICINE

## 2022-04-13 PROCEDURE — 99213 OFFICE O/P EST LOW 20 MIN: CPT | Performed by: INTERNAL MEDICINE

## 2022-04-13 NOTE — TELEPHONE ENCOUNTER
Dr. Kris Granados would like patient to follow up with neurology or vascular regarding his past stroke. The number for the patient is no longer in service. Please attempt to reach the patient.

## 2022-04-13 NOTE — TELEPHONE ENCOUNTER
Tried pt number and EC . Called pt dad. [phone]. Pt stated that he called [facility] last week.  He was also cleared through them

## 2022-04-13 NOTE — PROGRESS NOTES
[facility]   Electrophysiology Follow up   Date: 4/13/2022  I had the privilege of visiting Heather Galvez in the office. CC:  Loop recorder check/DOT letter   HPI: Heather Galvez is a 37 y.o. male with a past medical history of bells palsy,migraine,  cryptogenic stroke 2017 . S/p implantable loop recorder 12/18/2019. Needs DOT clearance   Interval History:  Malou Borrero presents today in follow up. 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 Assessment and plan:   Cryptogenic Stroke 2017    - s/p loop implant 12/18/2019    The CIED was interrogated and programmed and I supervised and reviewed all the data. All findings and changes are in device interrogation sheet and reflect my personal interpretation and changes and is scanned to Epic.  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 He has not had any arrhythmias. He will be followed in 1 year. Once daily loop monitor reaches MICHELLE, we can remove it. Continue nicotine patch to avoid smoking    Vertebral artery stenosis, left  This was not thought to be causing his prior strokes. Continue antiplatelet therapy.       Plan:   Follow-up in 1 year  Patient Active Problem List    Diagnosis Date Noted    Encounter for loop recorder check 03/19/2020    Osteoarthritis of spine with radiculopathy, cervical region 12/06/2019    Multilevel foraminal stenosis 12/06/2019    Visual changes 08/28/2019    Cerebellar cerebrovascular accident (CVA) without late effect 08/28/2019    Migraine with aura and without status migrainosus, not intractable     Tear of left glenoid labrum     Neural foraminal stenosis, multilevel 08/07/2018    Cerebral infarction due to embolism of cerebral artery ([facility] [address] 07/27/2018    Smoker 07/27/2018    Stenosis of left vertebral artery 06/11/2018    Stroke syndrome 06/11/2018     Diagnostic studies:   CT (CLARITIN) 10 MG tablet TAKE 1 TABLET BY MOUTH EVERY DAY 5/22/20  Yes Joana Dodson MD   aspirin (ADULT ASPIRIN REGIMEN) 81 MG EC tablet ADULT ASPIRIN REGIMEN 81 MG TBEC 1/20/20  Yes Historical Provider, MD   nicotine (NICODERM CQ) 21 MG/24HR Place 1 patch onto the skin daily  Patient not taking: Reported on 4/30/2020 3/13/20 4/24/20  M Arpan Baker MD   tiZANidine (ZANAFLEX) 4 MG tablet Take 1 tablet by mouth every 8 hours as needed (neck spasm)  Patient not taking: Reported on 4/13/2022 3/13/20   Joana Dodson MD       No Known Allergies    Social History:  Reviewed. reports that he has been smoking cigarettes. He started smoking about 25 years ago. He has a 2.10 pack-year smoking history. He has never used smokeless tobacco. He reports that he does not drink alcohol and does not use drugs. Family History:  Reviewed. Reviewed. No family history of SCD. Relevant and available labs, and cardiovascular diagnostics reviewed. Reviewed. I independently reviewed relevant and available cardiac diagnostic tests ECG, CXR, Echo, Stress test, Device interrogation, Holter, CT scan. Outside medical records via Care everywhere reviewed and summarized in H&P above. Complex medical condition with multiple medical problems affecting prognosis and outcome of EP interventions       - The patient is counseled to follow a low salt diet to assure blood pressure remains controlled for cardiovascular risk factor modification.   - The patient is counseled to avoid excess caffeine, and energy drinks as this may exacerbated ectopy and arrhythmia. - The patient is counseled to get regular exercise 3-5 times per week to control cardiovascular risk factors. All questions and concerns were addressed to the patient/family. Alternatives to my treatment were discussed. I have discussed the above stated plan and the patient verbalized understanding and agreed with the plan.     I, Dr. Dick Patterson personally performed the services described in this documentation as scribed by RN in my presence, and it is both accurate and complete.       NOTE: This report was transcribed using voice recognition software. Every effort was made to ensure accuracy, however, inadvertent computerized transcription errors may be present.      Ryder Hope MD, Delrae Closs [address] [place] [address]   Office: [phone]  Fax: [phone]

## 2022-05-16 ENCOUNTER — TELEPHONE (OUTPATIENT)
Dept: INTERNAL MEDICINE CLINIC | Age: 42
End: 2022-05-16

## 2022-05-16 NOTE — TELEPHONE ENCOUNTER
Patient was recently seen in emergency room. He was not seen in our office for quite some times. Recommend office visit.

## 2022-12-06 ENCOUNTER — TELEPHONE (OUTPATIENT)
Dept: INTERNAL MEDICINE CLINIC | Age: 42
End: 2022-12-06

## 2022-12-06 NOTE — TELEPHONE ENCOUNTER
Patient is over due for a follow up visit with PCP. Unable to leave a voicemail as VM box not set up. Letter sent.

## 2023-03-28 ENCOUNTER — TELEPHONE (OUTPATIENT)
Dept: INTERNAL MEDICINE CLINIC | Age: 43
End: 2023-03-28

## 2023-03-28 NOTE — TELEPHONE ENCOUNTER
LVM for patient to please call the office to schedule a follow up visit - patient has recently been in the ER and is over due for a routine follow up visit.

## 2023-03-31 NOTE — TELEPHONE ENCOUNTER
A BiPAP of  12/6 @ 25% was applied to the pt via the mask for an increase in WOB and/or SOB.  The bridge of the nose is clean and dry. Pt is tolerating it well. Will continue to monitor and assess the pt's current respiratory status and needs.     Venous Blood Gas       Recent Labs   Lab 03/26/23  1436 03/26/23  0623   PHV 7.45*  --    PCO2V 39*  --    PO2V 68*  --    HCO3V 27 27   SUSANNAH 2.5*  --    O2PER 30  --       EXAM: CHEST SINGLE VIEW PORTABLE  LOCATION: [facility]  DATE/TIME: 03/26/2023, 6:32 AM     INDICATION: Shortness of breath.  COMPARISON: 02/11/2023.     FINDINGS: A few chronic-appearing interstitial opacities again noted within both lungs and likely relate to scarring. The lungs are otherwise clear. Normal-sized cardiac silhouette. Atherosclerotic calcification in the thoracic aorta.                                                                      IMPRESSION: No convincing evidence of active cardiopulmonary disease.       Kody Medina, RT on 3/31/2023 at 5:41 AM   C-9 has been faxed to [address], as requested. I spoke to, April, FF PT and she will give this message to Bebo Wilks.

## 2023-04-05 ENCOUNTER — TELEPHONE (OUTPATIENT)
Dept: INTERNAL MEDICINE CLINIC | Age: 43
End: 2023-04-05

## 2023-04-05 NOTE — TELEPHONE ENCOUNTER
We are no longer listed as PCP. Pt was seen in [facility]. No answer when pt called. Unable to leave [facility].

## 2023-08-08 ENCOUNTER — HOSPITAL ENCOUNTER (EMERGENCY)
Age: 43
Discharge: HOME | End: 2023-08-08

## 2023-08-08 ENCOUNTER — APPOINTMENT (OUTPATIENT)
Dept: GENERAL RADIOLOGY | Age: 43
End: 2023-08-08

## 2023-08-08 ENCOUNTER — APPOINTMENT (OUTPATIENT)
Dept: CT IMAGING | Age: 43
End: 2023-08-08

## 2023-08-08 VITALS
OXYGEN SATURATION: 97 % | TEMPERATURE: 99 F | HEART RATE: 79 BPM | RESPIRATION RATE: 16 BRPM | BODY MASS INDEX: 24.99 KG/M2 | SYSTOLIC BLOOD PRESSURE: 111 MMHG | WEIGHT: 150 LBS | HEIGHT: 65 IN | DIASTOLIC BLOOD PRESSURE: 88 MMHG

## 2023-08-08 DIAGNOSIS — M51.26 LUMBAR HERNIATED DISC: Primary | ICD-10-CM

## 2023-08-08 DIAGNOSIS — U07.1 COVID-19: ICD-10-CM

## 2023-08-08 LAB
ALBUMIN SERPL-MCNC: 4.3 G/DL (ref 3.5–5.2)
ALP SERPL-CCNC: 101 U/L (ref 40–130)
ALT SERPL-CCNC: 18 U/L (ref 5–41)
ANION GAP SERPL CALCULATED.3IONS-SCNC: 11 MMOL/L (ref 7–19)
AST SERPL-CCNC: 19 U/L (ref 5–40)
B PARAP IS1001 DNA NPH QL NAA+NON-PROBE: NOT DETECTED
B PERT.PT PRMT NPH QL NAA+NON-PROBE: NOT DETECTED
BASOPHILS # BLD: 0 K/UL (ref 0–0.2)
BASOPHILS NFR BLD: 0.5 % (ref 0–1)
BILIRUB SERPL-MCNC: 0.4 MG/DL (ref 0.2–1.2)
BUN SERPL-MCNC: 9 MG/DL (ref 6–20)
C PNEUM DNA NPH QL NAA+NON-PROBE: NOT DETECTED
CALCIUM SERPL-MCNC: 9 MG/DL (ref 8.6–10)
CHLORIDE SERPL-SCNC: 105 MMOL/L (ref 98–111)
CO2 SERPL-SCNC: 25 MMOL/L (ref 22–29)
CREAT SERPL-MCNC: 1.2 MG/DL (ref 0.5–1.2)
EOSINOPHIL # BLD: 0 K/UL (ref 0–0.6)
EOSINOPHIL NFR BLD: 0 % (ref 0–5)
ERYTHROCYTE [DISTWIDTH] IN BLOOD BY AUTOMATED COUNT: 12.3 % (ref 11.5–14.5)
FLUAV RNA NPH QL NAA+NON-PROBE: NOT DETECTED
FLUBV RNA NPH QL NAA+NON-PROBE: NOT DETECTED
GLUCOSE SERPL-MCNC: 120 MG/DL (ref 74–109)
HADV DNA NPH QL NAA+NON-PROBE: NOT DETECTED
HCOV 229E RNA NPH QL NAA+NON-PROBE: NOT DETECTED
HCOV HKU1 RNA NPH QL NAA+NON-PROBE: NOT DETECTED
HCOV NL63 RNA NPH QL NAA+NON-PROBE: NOT DETECTED
HCOV OC43 RNA NPH QL NAA+NON-PROBE: NOT DETECTED
HCT VFR BLD AUTO: 41.7 % (ref 42–52)
HGB BLD-MCNC: 14.9 G/DL (ref 14–18)
HMPV RNA NPH QL NAA+NON-PROBE: NOT DETECTED
HPIV1 RNA NPH QL NAA+NON-PROBE: NOT DETECTED
HPIV2 RNA NPH QL NAA+NON-PROBE: NOT DETECTED
HPIV3 RNA NPH QL NAA+NON-PROBE: NOT DETECTED
HPIV4 RNA NPH QL NAA+NON-PROBE: NOT DETECTED
IMM GRANULOCYTES # BLD: 0 K/UL
LYMPHOCYTES # BLD: 0.9 K/UL (ref 1.1–4.5)
LYMPHOCYTES NFR BLD: 16.5 % (ref 20–40)
M PNEUMO DNA NPH QL NAA+NON-PROBE: NOT DETECTED
MCH RBC QN AUTO: 30.2 PG (ref 27–31)
MCHC RBC AUTO-ENTMCNC: 35.7 G/DL (ref 33–37)
MCV RBC AUTO: 84.6 FL (ref 80–94)
MONOCYTES # BLD: 0.8 K/UL (ref 0–0.9)
MONOCYTES NFR BLD: 15.1 % (ref 0–10)
NEUTROPHILS # BLD: 3.8 K/UL (ref 1.5–7.5)
NEUTS SEG NFR BLD: 67.7 % (ref 50–65)
PLATELET # BLD AUTO: 213 K/UL (ref 130–400)
PMV BLD AUTO: 10.8 FL (ref 9.4–12.4)
POTASSIUM SERPL-SCNC: 3.9 MMOL/L (ref 3.5–5)
PROT SERPL-MCNC: 7.4 G/DL (ref 6.6–8.7)
RBC # BLD AUTO: 4.93 M/UL (ref 4.7–6.1)
RSV RNA NPH QL NAA+NON-PROBE: NOT DETECTED
RV+EV RNA NPH QL NAA+NON-PROBE: NOT DETECTED
SARS-COV-2 RNA NPH QL NAA+NON-PROBE: DETECTED
SODIUM SERPL-SCNC: 141 MMOL/L (ref 136–145)
WBC # BLD AUTO: 5.6 K/UL (ref 4.8–10.8)

## 2023-08-08 PROCEDURE — 80053 COMPREHEN METABOLIC PANEL: CPT

## 2023-08-08 PROCEDURE — 6360000002 HC RX W HCPCS: Performed by: PHYSICIAN ASSISTANT

## 2023-08-08 PROCEDURE — 36415 COLL VENOUS BLD VENIPUNCTURE: CPT

## 2023-08-08 PROCEDURE — 2580000003 HC RX 258: Performed by: PHYSICIAN ASSISTANT

## 2023-08-08 PROCEDURE — 85025 COMPLETE CBC W/AUTO DIFF WBC: CPT

## 2023-08-08 PROCEDURE — 73502 X-RAY EXAM HIP UNI 2-3 VIEWS: CPT

## 2023-08-08 PROCEDURE — 0202U NFCT DS 22 TRGT SARS-COV-2: CPT

## 2023-08-08 PROCEDURE — 72131 CT LUMBAR SPINE W/O DYE: CPT

## 2023-08-08 PROCEDURE — 96375 TX/PRO/DX INJ NEW DRUG ADDON: CPT

## 2023-08-08 PROCEDURE — 96374 THER/PROPH/DIAG INJ IV PUSH: CPT

## 2023-08-08 PROCEDURE — 99284 EMERGENCY DEPT VISIT MOD MDM: CPT

## 2023-08-08 RX ORDER — PROCHLORPERAZINE EDISYLATE 5 MG/ML
10 INJECTION INTRAMUSCULAR; INTRAVENOUS ONCE
Status: COMPLETED | OUTPATIENT
Start: 2023-08-08 | End: 2023-08-08

## 2023-08-08 RX ORDER — DEXAMETHASONE SODIUM PHOSPHATE 10 MG/ML
4 INJECTION, SOLUTION INTRAMUSCULAR; INTRAVENOUS ONCE
Status: COMPLETED | OUTPATIENT
Start: 2023-08-08 | End: 2023-08-08

## 2023-08-08 RX ORDER — 0.9 % SODIUM CHLORIDE 0.9 %
500 INTRAVENOUS SOLUTION INTRAVENOUS ONCE
Status: COMPLETED | OUTPATIENT
Start: 2023-08-08 | End: 2023-08-08

## 2023-08-08 RX ORDER — KETOROLAC TROMETHAMINE 30 MG/ML
30 INJECTION, SOLUTION INTRAMUSCULAR; INTRAVENOUS ONCE
Status: COMPLETED | OUTPATIENT
Start: 2023-08-08 | End: 2023-08-08

## 2023-08-08 RX ORDER — METHYLPREDNISOLONE 4 MG/1
TABLET ORAL
Qty: 1 KIT | Refills: 0 | Status: SHIPPED | OUTPATIENT
Start: 2023-08-08 | End: 2023-08-14

## 2023-08-08 RX ADMIN — PROCHLORPERAZINE EDISYLATE 10 MG: 5 INJECTION INTRAMUSCULAR; INTRAVENOUS at 10:38

## 2023-08-08 RX ADMIN — SODIUM CHLORIDE 500 ML: 9 INJECTION, SOLUTION INTRAVENOUS at 10:39

## 2023-08-08 RX ADMIN — DEXAMETHASONE SODIUM PHOSPHATE 4 MG: 10 INJECTION, SOLUTION INTRAMUSCULAR; INTRAVENOUS at 10:35

## 2023-08-08 RX ADMIN — KETOROLAC TROMETHAMINE 30 MG: 30 INJECTION, SOLUTION INTRAMUSCULAR; INTRAVENOUS at 10:37

## 2023-08-08 ASSESSMENT — ENCOUNTER SYMPTOMS
SHORTNESS OF BREATH: 0
EYE DISCHARGE: 0
EYE ITCHING: 0
BACK PAIN: 0
APNEA: 0
COLOR CHANGE: 0
PHOTOPHOBIA: 0
COUGH: 0

## 2023-08-08 ASSESSMENT — PAIN - FUNCTIONAL ASSESSMENT
PAIN_FUNCTIONAL_ASSESSMENT: 0-10
PAIN_FUNCTIONAL_ASSESSMENT: 0-10

## 2023-08-08 ASSESSMENT — PAIN SCALES - GENERAL
PAINLEVEL_OUTOF10: 8
PAINLEVEL_OUTOF10: 4

## 2025-06-17 ENCOUNTER — OFFICE VISIT (OUTPATIENT)
Dept: INTERNAL MEDICINE CLINIC | Age: 45
End: 2025-06-17
Payer: COMMERCIAL

## 2025-06-17 VITALS
HEART RATE: 64 BPM | OXYGEN SATURATION: 97 % | BODY MASS INDEX: 26.49 KG/M2 | HEIGHT: 65 IN | WEIGHT: 159 LBS | SYSTOLIC BLOOD PRESSURE: 118 MMHG | DIASTOLIC BLOOD PRESSURE: 84 MMHG

## 2025-06-17 DIAGNOSIS — I63.9 CRYPTOGENIC STROKE (HCC): ICD-10-CM

## 2025-06-17 DIAGNOSIS — N28.89 RENAL SCARRING: ICD-10-CM

## 2025-06-17 DIAGNOSIS — K21.9 GASTROESOPHAGEAL REFLUX DISEASE WITHOUT ESOPHAGITIS: ICD-10-CM

## 2025-06-17 DIAGNOSIS — K59.00 CONSTIPATION, UNSPECIFIED CONSTIPATION TYPE: ICD-10-CM

## 2025-06-17 DIAGNOSIS — I10 PRIMARY HYPERTENSION: ICD-10-CM

## 2025-06-17 DIAGNOSIS — M47.812 SPONDYLOSIS OF CERVICAL REGION WITHOUT MYELOPATHY OR RADICULOPATHY: ICD-10-CM

## 2025-06-17 DIAGNOSIS — R07.9 LEFT-SIDED CHEST PAIN: Primary | ICD-10-CM

## 2025-06-17 DIAGNOSIS — Z98.890 HISTORY OF LOOP RECORDER: ICD-10-CM

## 2025-06-17 DIAGNOSIS — J30.1 ALLERGIC RHINITIS DUE TO POLLEN, UNSPECIFIED SEASONALITY: ICD-10-CM

## 2025-06-17 PROCEDURE — 3079F DIAST BP 80-89 MM HG: CPT | Performed by: INTERNAL MEDICINE

## 2025-06-17 PROCEDURE — 3074F SYST BP LT 130 MM HG: CPT | Performed by: INTERNAL MEDICINE

## 2025-06-17 PROCEDURE — 99204 OFFICE O/P NEW MOD 45 MIN: CPT | Performed by: INTERNAL MEDICINE

## 2025-06-17 RX ORDER — POLYETHYLENE GLYCOL 3350 17 G/17G
17 POWDER, FOR SOLUTION ORAL DAILY
Qty: 1530 G | Refills: 1 | Status: SHIPPED | OUTPATIENT
Start: 2025-06-17

## 2025-06-17 RX ORDER — METOPROLOL TARTRATE 50 MG
TABLET ORAL
Qty: 3 TABLET | Refills: 0 | Status: SHIPPED | OUTPATIENT
Start: 2025-06-17 | End: 2025-06-17 | Stop reason: SDUPTHER

## 2025-06-17 RX ORDER — SODIUM CHLORIDE 0.9 % (FLUSH) 0.9 %
5-40 SYRINGE (ML) INJECTION EVERY 12 HOURS SCHEDULED
OUTPATIENT
Start: 2025-06-23

## 2025-06-17 RX ORDER — SODIUM CHLORIDE 0.9 % (FLUSH) 0.9 %
5-40 SYRINGE (ML) INJECTION PRN
OUTPATIENT
Start: 2025-06-23

## 2025-06-17 RX ORDER — LISINOPRIL 10 MG/1
10 TABLET ORAL DAILY
Qty: 30 TABLET | Refills: 5 | Status: SHIPPED | OUTPATIENT
Start: 2025-06-17

## 2025-06-17 RX ORDER — METOPROLOL TARTRATE 50 MG
TABLET ORAL
Qty: 3 TABLET | Refills: 0 | Status: SHIPPED | OUTPATIENT
Start: 2025-06-17

## 2025-06-17 RX ORDER — LISINOPRIL 10 MG/1
10 TABLET ORAL DAILY
COMMUNITY
Start: 2025-06-13 | End: 2025-06-17 | Stop reason: SDUPTHER

## 2025-06-17 RX ORDER — PANTOPRAZOLE SODIUM 40 MG/1
40 TABLET, DELAYED RELEASE ORAL
Qty: 30 TABLET | Refills: 2 | Status: SHIPPED | OUTPATIENT
Start: 2025-06-17 | End: 2025-06-17 | Stop reason: SDUPTHER

## 2025-06-17 RX ORDER — SODIUM CHLORIDE 9 MG/ML
INJECTION, SOLUTION INTRAVENOUS PRN
OUTPATIENT
Start: 2025-06-23

## 2025-06-17 RX ORDER — PANTOPRAZOLE SODIUM 40 MG/1
40 TABLET, DELAYED RELEASE ORAL
Qty: 30 TABLET | Refills: 2 | Status: SHIPPED | OUTPATIENT
Start: 2025-06-17

## 2025-06-17 RX ORDER — NITROGLYCERIN 0.4 MG/1
0.4 TABLET SUBLINGUAL
OUTPATIENT
Start: 2025-06-23

## 2025-06-17 RX ORDER — NITROGLYCERIN 0.4 MG/1
0.8 TABLET SUBLINGUAL
OUTPATIENT
Start: 2025-06-23

## 2025-06-17 RX ORDER — METOPROLOL TARTRATE 1 MG/ML
5 INJECTION, SOLUTION INTRAVENOUS EVERY 5 MIN PRN
OUTPATIENT
Start: 2025-06-23

## 2025-06-17 SDOH — ECONOMIC STABILITY: FOOD INSECURITY: WITHIN THE PAST 12 MONTHS, YOU WORRIED THAT YOUR FOOD WOULD RUN OUT BEFORE YOU GOT MONEY TO BUY MORE.: NEVER TRUE

## 2025-06-17 SDOH — ECONOMIC STABILITY: FOOD INSECURITY: WITHIN THE PAST 12 MONTHS, THE FOOD YOU BOUGHT JUST DIDN'T LAST AND YOU DIDN'T HAVE MONEY TO GET MORE.: NEVER TRUE

## 2025-06-17 ASSESSMENT — ENCOUNTER SYMPTOMS
CONSTIPATION: 1
COUGH: 0
VOMITING: 0
EYE PAIN: 0
VOICE CHANGE: 0
WHEEZING: 0
BACK PAIN: 0
SHORTNESS OF BREATH: 0
ABDOMINAL PAIN: 0
RHINORRHEA: 0
COLOR CHANGE: 0
NAUSEA: 0
EYE DISCHARGE: 0

## 2025-06-17 ASSESSMENT — PATIENT HEALTH QUESTIONNAIRE - PHQ9
1. LITTLE INTEREST OR PLEASURE IN DOING THINGS: NOT AT ALL
SUM OF ALL RESPONSES TO PHQ QUESTIONS 1-9: 0
2. FEELING DOWN, DEPRESSED OR HOPELESS: NOT AT ALL

## 2025-06-17 NOTE — PROGRESS NOTES
Heart sounds: Normal heart sounds. No murmur heard.     Comments: History of loop recorder placement in the chest wall.  Patient no longer following up with cardiologist  Pulmonary:      Effort: Pulmonary effort is normal.      Breath sounds: Normal breath sounds. No wheezing.   Abdominal:      General: Bowel sounds are normal.      Palpations: Abdomen is soft.      Tenderness: There is no abdominal tenderness. There is no right CVA tenderness, guarding or rebound.      Hernia: No hernia is present.   Musculoskeletal:         General: No tenderness. Normal range of motion.      Cervical back: Normal range of motion and neck supple.   Lymphadenopathy:      Cervical: No cervical adenopathy.   Skin:     General: Skin is warm and dry.      Findings: No erythema or rash.   Neurological:      General: No focal deficit present.      Mental Status: He is alert and oriented to person, place, and time.      Motor: No abnormal muscle tone.   Psychiatric:         Behavior: Behavior normal.         Thought Content: Thought content normal.       Physical Exam  Neurological: Awake, alert, oriented x4, no focal deficit  Cardiovascular: Regular rate and rhythm, no murmurs, rubs, or gallops  Gastrointestinal: Soft, no tenderness, no distention, no masses       CBC:   Lab Results   Component Value Date/Time    WBC 5.6 08/08/2023 10:29 AM    HGB 14.9 08/08/2023 10:29 AM    HCT 41.7 08/08/2023 10:29 AM     08/08/2023 10:29 AM     CMP:  Lab Results   Component Value Date/Time     08/08/2023 10:29 AM    K 3.9 08/08/2023 10:29 AM     08/08/2023 10:29 AM    CO2 25 08/08/2023 10:29 AM    ANIONGAP 11 08/08/2023 10:29 AM    GLUCOSE 120 08/08/2023 10:29 AM    BUN 9 08/08/2023 10:29 AM    CREATININE 1.2 08/08/2023 10:29 AM    GFRAA >60 08/28/2019 12:24 AM    CALCIUM 9.0 08/08/2023 10:29 AM    AGRATIO 1.4 08/28/2019 12:24 AM    BILITOT 0.4 08/08/2023 10:29 AM    ALKPHOS 101 08/08/2023 10:29 AM    ALT 18 08/08/2023 10:29 AM

## 2025-06-25 ENCOUNTER — HOSPITAL ENCOUNTER (OUTPATIENT)
Dept: CT IMAGING | Age: 45
Discharge: HOME | End: 2025-06-25
Payer: COMMERCIAL

## 2025-06-25 VITALS — SYSTOLIC BLOOD PRESSURE: 119 MMHG | DIASTOLIC BLOOD PRESSURE: 73 MMHG | HEART RATE: 73 BPM

## 2025-06-25 DIAGNOSIS — R07.9 LEFT-SIDED CHEST PAIN: ICD-10-CM

## 2025-06-25 DIAGNOSIS — I63.9 CRYPTOGENIC STROKE (HCC): ICD-10-CM

## 2025-06-25 PROCEDURE — 6360000004 HC RX CONTRAST MEDICATION: Performed by: INTERNAL MEDICINE

## 2025-06-25 PROCEDURE — 6370000000 HC RX 637 (ALT 250 FOR IP): Performed by: RADIOLOGY

## 2025-06-25 PROCEDURE — 75574 CT ANGIO HRT W/3D IMAGE: CPT

## 2025-06-25 PROCEDURE — 2500000003 HC RX 250 WO HCPCS: Performed by: RADIOLOGY

## 2025-06-25 RX ORDER — IOPAMIDOL 755 MG/ML
100 INJECTION, SOLUTION INTRAVASCULAR
Status: COMPLETED | OUTPATIENT
Start: 2025-06-25 | End: 2025-06-25

## 2025-06-25 RX ORDER — SODIUM CHLORIDE 9 MG/ML
INJECTION, SOLUTION INTRAVENOUS PRN
Status: DISCONTINUED | OUTPATIENT
Start: 2025-06-25 | End: 2025-06-26 | Stop reason: HOSPADM

## 2025-06-25 RX ORDER — METOPROLOL TARTRATE 50 MG
50 TABLET ORAL PRN
Status: DISCONTINUED | OUTPATIENT
Start: 2025-06-25 | End: 2025-06-26 | Stop reason: HOSPADM

## 2025-06-25 RX ORDER — SODIUM CHLORIDE 0.9 % (FLUSH) 0.9 %
5-40 SYRINGE (ML) INJECTION EVERY 12 HOURS SCHEDULED
Status: DISCONTINUED | OUTPATIENT
Start: 2025-06-25 | End: 2025-06-26 | Stop reason: HOSPADM

## 2025-06-25 RX ORDER — METOPROLOL TARTRATE 1 MG/ML
5 INJECTION, SOLUTION INTRAVENOUS EVERY 5 MIN PRN
Status: DISCONTINUED | OUTPATIENT
Start: 2025-06-25 | End: 2025-06-26 | Stop reason: HOSPADM

## 2025-06-25 RX ORDER — METOPROLOL TARTRATE 100 MG/1
100 TABLET ORAL PRN
Status: DISCONTINUED | OUTPATIENT
Start: 2025-06-25 | End: 2025-06-26 | Stop reason: HOSPADM

## 2025-06-25 RX ORDER — SODIUM CHLORIDE 0.9 % (FLUSH) 0.9 %
5-40 SYRINGE (ML) INJECTION PRN
Status: DISCONTINUED | OUTPATIENT
Start: 2025-06-25 | End: 2025-06-26 | Stop reason: HOSPADM

## 2025-06-25 RX ORDER — NITROGLYCERIN 0.4 MG/1
0.4 TABLET SUBLINGUAL PRN
Status: COMPLETED | OUTPATIENT
Start: 2025-06-25 | End: 2025-06-25

## 2025-06-25 RX ORDER — NITROGLYCERIN 0.4 MG/1
0.8 TABLET SUBLINGUAL PRN
Status: COMPLETED | OUTPATIENT
Start: 2025-06-25 | End: 2025-06-25

## 2025-06-25 RX ADMIN — NITROGLYCERIN 0.8 MG: 0.4 TABLET SUBLINGUAL at 12:01

## 2025-06-25 RX ADMIN — IOPAMIDOL 100 ML: 755 INJECTION, SOLUTION INTRAVENOUS at 12:21

## 2025-06-25 RX ADMIN — METOROPROLOL TARTRATE 5 MG: 5 INJECTION, SOLUTION INTRAVENOUS at 11:51

## 2025-06-25 NOTE — PROGRESS NOTES
Pt here for Cardiac CTA test.  Administered 5mg of metoprolol to achieve desired heart rate of 60 BPM.  Administered 0.8mg nitroglycerin sublingual for exam.  Pt tolerated well.  VSS. See flow sheet.  Reviewed Cardiac CTA discharge instructions with pt - verbalized understanding, no further questions. Pt discharged to home.

## 2025-06-25 NOTE — DISCHARGE INSTRUCTIONS
Thank You for choosing [facility] for your medical care.    During your examination, you were given a Beta Blocker called Metopropol or Lopressor to help slow down your heart rate. The dose of the medication you were given was 5mg metoprolol and 2 sublingual nitroglycerin.    Although there are few side effects from this medication when given in small amounts (doses) it is important you follow a few important instructions:    Notify the doctor that ordered your test or go to the nearest emergency room if you experience any of the following:  difficulty breathing  dizziness  extreme fatigue  pounding or irregular heart beat    Continue your usual diet, increase fluids today.  (Four 8 ounce glasses of water).                    4.You may return back to your normal activity and routine tomorrow.                Test results will be sent to your Physician.    If you take Actoplus Met, Avandamet, Glucophage, Glucophage XR, Glucovance, Metformin, Metaglip, Riomet, or Fortmet hold medication for 48 hours after procedure and resum

## 2025-06-30 NOTE — PROGRESS NOTES
[facility]   Electrophysiology Consultation   Date: 7/1/2025  Reason for Consultation: Re establish care   Consult Requesting Physician: VINEET   Chief Complaint   Patient presents with    Follow-up     No cardiac symptoms at this time.       CC: Hx. Cryptogenic stroke   HPI: Hira Sarah II is a 44 y.o. male with a past medical history of Bell's Palsy, migraine and cryptogenic stroke (2017).    He had loop recorder placed in 12/2019. No arrhythmias noted on loop recorder throughout the monitoring period.     6/29/2025 Admitted to [place] for complaints of intermittent blurry vision in the right eye and headache. CTA noted old infarct. MRI negative for acute infarction. EKG was SR. Workup unremarkable. He was started on a statin.    He was last seen by EP in 5/2022.    Interval History:  History of Present Illness  The patient presents for loop recorder removal and concerns regarding Chiari malformation.    He believes that he has  Chiari malformation and is seeking a specialist for further evaluation. He suspects that his previous strokes were a consequence of this condition. Recently, he experienced a significant episode where he felt an intensification of his symptoms, including new ones such as complete numbness in his arm and hand, tingling sensations, and loss of vision in his right eye. This was accompanied by a bright light, a symptom he had not previously encountered. He also experienced memory loss, forgetting his children's names during a conversation with his wife. These symptoms are new to him, having not occurred in the past 5 to 6 years of dealing with this condition. He believes that his Chiari malformation is the primary cause of his health issues, but feels that his doctors are more focused on his cardiac and stroke-related problems.    He has a loop recorder implanted and is interested in having it removed.    FAMILY HISTORY  His sister had Chiari malformation and underwent surgery for

## 2025-07-01 ENCOUNTER — CLINICAL SUPPORT (OUTPATIENT)
Dept: CARDIOLOGY CLINIC | Age: 45
End: 2025-07-01

## 2025-07-01 ENCOUNTER — OFFICE VISIT (OUTPATIENT)
Dept: CARDIOLOGY CLINIC | Age: 45
End: 2025-07-01
Payer: COMMERCIAL

## 2025-07-01 VITALS
HEIGHT: 65 IN | DIASTOLIC BLOOD PRESSURE: 76 MMHG | HEART RATE: 88 BPM | WEIGHT: 154 LBS | BODY MASS INDEX: 25.66 KG/M2 | SYSTOLIC BLOOD PRESSURE: 124 MMHG | OXYGEN SATURATION: 97 %

## 2025-07-01 DIAGNOSIS — Z86.69 HISTORY OF CHIARI MALFORMATION: ICD-10-CM

## 2025-07-01 DIAGNOSIS — Z45.09 ENCOUNTER FOR LOOP RECORDER AT END OF BATTERY LIFE: ICD-10-CM

## 2025-07-01 DIAGNOSIS — Z86.73 CEREBELLAR CEREBROVASCULAR ACCIDENT (CVA) WITHOUT LATE EFFECT: ICD-10-CM

## 2025-07-01 DIAGNOSIS — I63.9 CRYPTOGENIC STROKE (HCC): Primary | ICD-10-CM

## 2025-07-01 PROCEDURE — 93000 ELECTROCARDIOGRAM COMPLETE: CPT | Performed by: INTERNAL MEDICINE

## 2025-07-01 PROCEDURE — 99203 OFFICE O/P NEW LOW 30 MIN: CPT | Performed by: INTERNAL MEDICINE

## 2025-07-01 RX ORDER — TRAMADOL HYDROCHLORIDE 50 MG/1
TABLET ORAL
COMMUNITY
Start: 2025-04-02

## 2025-07-01 RX ORDER — FAMOTIDINE 40 MG/1
40 TABLET, FILM COATED ORAL
COMMUNITY
Start: 2025-06-12

## 2025-07-01 RX ORDER — ATORVASTATIN CALCIUM 40 MG/1
40 TABLET, FILM COATED ORAL NIGHTLY
COMMUNITY
Start: 2025-06-30 | End: 2025-07-30

## 2025-07-01 NOTE — PATIENT INSTRUCTIONS
Instructions for your Loop Recorder Implant and/or Removal     Our  will call you to discuss a date for you procedure within 5-10 business days     Bring a list of your medications.  Do not eat or drink anything for 4 hours prior to the procedure.  Take all morning medications the day of the procedure with a small amount of water.  Discharge instructions will be given to you at the time of your procedure.

## 2025-07-07 ENCOUNTER — OFFICE VISIT (OUTPATIENT)
Dept: INTERNAL MEDICINE CLINIC | Age: 45
End: 2025-07-07
Payer: COMMERCIAL

## 2025-07-07 VITALS
OXYGEN SATURATION: 98 % | HEIGHT: 65 IN | BODY MASS INDEX: 25.49 KG/M2 | SYSTOLIC BLOOD PRESSURE: 108 MMHG | HEART RATE: 89 BPM | DIASTOLIC BLOOD PRESSURE: 74 MMHG | WEIGHT: 153 LBS

## 2025-07-07 DIAGNOSIS — M47.812 CERVICAL SPONDYLOSIS: ICD-10-CM

## 2025-07-07 DIAGNOSIS — R19.02 LEFT UPPER QUADRANT ABDOMINAL MASS: ICD-10-CM

## 2025-07-07 DIAGNOSIS — R21 RASH: ICD-10-CM

## 2025-07-07 DIAGNOSIS — R51.9 CHRONIC NONINTRACTABLE HEADACHE, UNSPECIFIED HEADACHE TYPE: ICD-10-CM

## 2025-07-07 DIAGNOSIS — R14.3 EXCESSIVE GAS: ICD-10-CM

## 2025-07-07 DIAGNOSIS — G89.29 CHRONIC NONINTRACTABLE HEADACHE, UNSPECIFIED HEADACHE TYPE: ICD-10-CM

## 2025-07-07 DIAGNOSIS — R20.0 LEFT ARM NUMBNESS: ICD-10-CM

## 2025-07-07 DIAGNOSIS — I63.9 CEREBROVASCULAR ACCIDENT (CVA), UNSPECIFIED MECHANISM (HCC): Primary | ICD-10-CM

## 2025-07-07 DIAGNOSIS — I63.9 CRYPTOGENIC STROKE (HCC): ICD-10-CM

## 2025-07-07 PROCEDURE — 99215 OFFICE O/P EST HI 40 MIN: CPT | Performed by: INTERNAL MEDICINE

## 2025-07-07 RX ORDER — TRAMADOL HYDROCHLORIDE 50 MG/1
50 TABLET ORAL EVERY 12 HOURS
Qty: 14 TABLET | Refills: 0 | Status: SHIPPED | OUTPATIENT
Start: 2025-07-07 | End: 2025-07-14

## 2025-07-07 RX ORDER — TRAMADOL HYDROCHLORIDE 50 MG/1
TABLET ORAL
Status: CANCELLED | OUTPATIENT
Start: 2025-07-07

## 2025-07-07 RX ORDER — CLOTRIMAZOLE AND BETAMETHASONE DIPROPIONATE 10; .64 MG/G; MG/G
CREAM TOPICAL
Qty: 45 G | Refills: 0 | Status: SHIPPED | OUTPATIENT
Start: 2025-07-07

## 2025-07-07 ASSESSMENT — ENCOUNTER SYMPTOMS
ABDOMINAL PAIN: 1
CHEST TIGHTNESS: 0
EYE REDNESS: 0
SHORTNESS OF BREATH: 0
NAUSEA: 0
VOICE CHANGE: 0
TROUBLE SWALLOWING: 0

## 2025-07-07 NOTE — PROGRESS NOTES
heart sounds.   Pulmonary:      Effort: Pulmonary effort is normal.      Breath sounds: Normal breath sounds.   Abdominal:      General: Bowel sounds are normal.      Tenderness: There is no right CVA tenderness, left CVA tenderness, guarding or rebound.   Musculoskeletal:      Cervical back: Neck supple. No rigidity.      Right lower leg: No edema.      Left lower leg: No edema.   Skin:     Findings: Rash present.   Neurological:      Mental Status: He is alert.      Comments: Plus minus  off balance  Abnormal heel-to-toe walk       Physical Exam  Neurological: Poor balance and coordination, unable to walk in a straight line. Left arm weakness.  Skin: Rash on right side.       CBC:   Lab Results   Component Value Date/Time    WBC 5.6 08/08/2023 10:29 AM    HGB 14.9 08/08/2023 10:29 AM    HCT 41.7 08/08/2023 10:29 AM     08/08/2023 10:29 AM     CMP:  Lab Results   Component Value Date/Time     08/08/2023 10:29 AM    K 3.9 08/08/2023 10:29 AM     08/08/2023 10:29 AM    CO2 25 08/08/2023 10:29 AM    ANIONGAP 11 08/08/2023 10:29 AM    GLUCOSE 120 08/08/2023 10:29 AM    BUN 9 08/08/2023 10:29 AM    CREATININE 1.2 08/08/2023 10:29 AM    GFRAA >60 08/28/2019 12:24 AM    CALCIUM 9.0 08/08/2023 10:29 AM    AGRATIO 1.4 08/28/2019 12:24 AM    BILITOT 0.4 08/08/2023 10:29 AM    ALKPHOS 101 08/08/2023 10:29 AM    ALT 18 08/08/2023 10:29 AM    AST 19 08/08/2023 10:29 AM    GLOB 3.1 08/28/2019 12:24 AM     URINALYSIS:  Lab Results   Component Value Date/Time    GLUCOSEU NEGATIVE 01/01/2013 10:13 AM    KETUA NEGATIVE 01/01/2013 10:13 AM    PHUR 7.0 01/01/2013 10:13 AM    PROTEINU NEGATIVE 01/01/2013 10:13 AM    NITRU NEGATIVE 01/01/2013 10:13 AM    LEUKOCYTESUR SMALL 01/01/2013 10:13 AM       Assessment/Plan:  Hira \"John\" was seen today for follow-up from hospital.    Diagnoses and all orders for this visit:  Patient was recently admitted to the [facility] system for acute CVA/TIA.  Underwent multiple

## 2025-07-08 ENCOUNTER — TELEPHONE (OUTPATIENT)
Dept: INTERNAL MEDICINE CLINIC | Age: 45
End: 2025-07-08

## 2025-07-08 NOTE — TELEPHONE ENCOUNTER
Please clarify with patient the provider he mentioned work with neurosurgery not neurology.  He can still see him however he needs to establish with neurologist Dr Barton as well.

## 2025-07-08 NOTE — TELEPHONE ENCOUNTER
Neuro referral made yesterday to  but pt already sees Bebeto Grove, PA  @  and wants to stay with him.    He has 2 MRIs scheduled for 8/12 and then will follow up with that provider after his tests.    He just wanted to let  know.    No call back to pt unless there is something new to tell him.

## (undated) DEVICE — [ARTHROSCOPY PUMP,  DO NOT USE IF PACKAGE IS DAMAGED,  KEEP DRY,  KEEP AWAY FROM SUNLIGHT,  PROTECT FROM HEAT AND RADIOACTIVE SOURCES.]: Brand: FLOSTEADY

## (undated) DEVICE — MAJOR SET UP PK

## (undated) DEVICE — KIT OR ROOM TURNOVER W/STRAP

## (undated) DEVICE — 3M™ COBAN™ NL STERILE NON-LATEX SELF-ADHERENT WRAP, 2086S, 6 IN X 5 YD (15 CM X 4,5 M), 12 ROLLS/CASE: Brand: 3M™ COBAN™

## (undated) DEVICE — CANNULA ARTHSCP L7CM DIA6MM CONIC TIP THRD NONSHIELDED DISP

## (undated) DEVICE — 3M™ TEGADERM™ TRANSPARENT FILM DRESSING FRAME STYLE, 1626W, 4 IN X 4-3/4 IN (10 CM X 12 CM), 50/CT 4CT/CASE: Brand: 3M™ TEGADERM™

## (undated) DEVICE — GLOVE SURG SZ 75 L12IN FNGR THK87MIL WHT LTX FREE

## (undated) DEVICE — PAD DRY FLOOR ABS 32X58IN GRN

## (undated) DEVICE — SHOULDER CANNULA SET WITHOUT FENESTRATIONS, 5.5 MM (I.D.) X 70 MM: Brand: CONMED

## (undated) DEVICE — TUBING, SUCTION, 1/4" X 12', STRAIGHT: Brand: MEDLINE

## (undated) DEVICE — SUTURE PROL SZ 0 L30IN NONABSORBABLE BLU MO-6 L26MM 1/2 CIR 8418H

## (undated) DEVICE — [STANDARD 12-FLUTE BARREL BUR, ARTHROSCOPIC SHAVER BLADE,  DO NOT RESTERILIZE,  DO NOT USE IF PACKAGE IS DAMAGED,  KEEP DRY,  KEEP AWAY FROM SUNLIGHT]: Brand: FORMULA

## (undated) DEVICE — SPONGE GZ W4XL4IN COT 12 PLY TYP VII WVN C FLD DSGN

## (undated) DEVICE — 4-PORT MANIFOLD: Brand: NEPTUNE 2

## (undated) DEVICE — STOCKINETTE IMPERV M 9X44IN POLY INNR LAYR COT ORTH EXT

## (undated) DEVICE — SLEEVE ARM DISPOSABLE FOR LAT DECUB SHLDR SUSP SYS

## (undated) DEVICE — 3M™ IOBAN™ 2 ANTIMICROBIAL INCISE DRAPE 6650EZ: Brand: IOBAN™ 2

## (undated) DEVICE — SUTURE PROL SZ 0 L30IN NONABSORBABLE BLU L36MM CT-1 1/2 CIR 8424H

## (undated) DEVICE — CHLORAPREP 26ML ORANGE

## (undated) DEVICE — SUTURE FIBERLINK SZ 2-0 L26IN NONABSORBABLE BLU CLS LOOP AR7235

## (undated) DEVICE — DRAPE,U/SHT,SPLIT,FILM,60X84,STERILE: Brand: MEDLINE

## (undated) DEVICE — PACK,SHOULDER,DRAPE,POUCH: Brand: MEDLINE

## (undated) DEVICE — 1010 S-DRAPE TOWEL DRAPE 10/BX: Brand: STERI-DRAPE™

## (undated) DEVICE — [AGGRESSIVE PLUS CUTTER, ARTHROSCOPIC SHAVER BLADE,  DO NOT RESTERILIZE,  DO NOT USE IF PACKAGE IS DAMAGED,  KEEP DRY,  KEEP AWAY FROM SUNLIGHT]: Brand: FORMULA

## (undated) DEVICE — GARMENT COMPR L FOR 23IN CALF FLOTRN

## (undated) DEVICE — Z INACTIVE USE 2660664 SOLUTION IRRIG 3000ML 0.9% SOD CHL USP UROMATIC PLAS CONT

## (undated) DEVICE — NEEDLE SPNL L3.5IN PNK HUB S STL REG WALL FIT STYL W/ QNCKE

## (undated) DEVICE — 3M™ STERI-DRAPE™ U-DRAPE 1015: Brand: STERI-DRAPE™

## (undated) DEVICE — 3M™ COBAN™ NL STERILE NON-LATEX SELF-ADHERENT WRAP, 2084S, 4 IN X 5 YD (10 CM X 4,5 M), 18 ROLLS/CASE: Brand: 3M™ COBAN™

## (undated) DEVICE — SLING TRAC LAT DISP FOR DECUB SHLDR SUSP SYS

## (undated) DEVICE — ELECTRODE PT RET AD L9FT HI MOIST COND ADH HYDRGEL CORDED

## (undated) DEVICE — GOWN SIRUS NONREIN XL W/TWL: Brand: MEDLINE INDUSTRIES, INC.

## (undated) DEVICE — NEEDLE SUT PASS FOR ROT CUF LABRAL REP SUREFIRE SCORPION

## (undated) DEVICE — SHEET,DRAPE,53X77,STERILE: Brand: MEDLINE

## (undated) DEVICE — STRIP,CLOSURE,WOUND,MEDI-STRIP,1/2X4: Brand: MEDLINE

## (undated) DEVICE — ELECTRODE ES 90DEG SUCT INTEGR HNDPC DISP COOLPULSE 90 VAPR

## (undated) DEVICE — CANNULA ARTHSCP L7CM ID8.25MM TRNSLUC THRD FLX W/ NO SQUIRT

## (undated) DEVICE — GLOVE SURG SZ 8 L12IN FNGR THK87MIL WHT LTX FREE

## (undated) DEVICE — INTENDED FOR TISSUE SEPARATION, AND OTHER PROCEDURES THAT REQUIRE A SHARP SURGICAL BLADE TO PUNCTURE OR CUT.: Brand: BARD-PARKER ® STAINLESS STEEL BLADES

## (undated) DEVICE — IMMOBILIZER SHLDR SWTH UNIV DEV BLK P.A.D. III